# Patient Record
Sex: MALE | Race: BLACK OR AFRICAN AMERICAN | NOT HISPANIC OR LATINO | Employment: UNEMPLOYED | URBAN - METROPOLITAN AREA
[De-identification: names, ages, dates, MRNs, and addresses within clinical notes are randomized per-mention and may not be internally consistent; named-entity substitution may affect disease eponyms.]

---

## 2018-01-30 ENCOUNTER — OFFICE VISIT CONVERTED (OUTPATIENT)
Dept: FAMILY MEDICINE CLINIC | Facility: CLINIC | Age: 23
End: 2018-01-30
Attending: NURSE PRACTITIONER

## 2018-04-30 ENCOUNTER — OFFICE VISIT CONVERTED (OUTPATIENT)
Dept: FAMILY MEDICINE CLINIC | Facility: CLINIC | Age: 23
End: 2018-04-30
Attending: PHYSICIAN ASSISTANT

## 2018-06-29 ENCOUNTER — OFFICE VISIT CONVERTED (OUTPATIENT)
Dept: ONCOLOGY | Facility: HOSPITAL | Age: 23
End: 2018-06-29
Attending: INTERNAL MEDICINE

## 2018-07-16 ENCOUNTER — CONVERSION ENCOUNTER (OUTPATIENT)
Dept: FAMILY MEDICINE CLINIC | Facility: CLINIC | Age: 23
End: 2018-07-16

## 2018-07-16 ENCOUNTER — OFFICE VISIT CONVERTED (OUTPATIENT)
Dept: FAMILY MEDICINE CLINIC | Facility: CLINIC | Age: 23
End: 2018-07-16
Attending: PHYSICIAN ASSISTANT

## 2018-07-27 ENCOUNTER — OFFICE VISIT CONVERTED (OUTPATIENT)
Dept: ONCOLOGY | Facility: HOSPITAL | Age: 23
End: 2018-07-27
Attending: INTERNAL MEDICINE

## 2018-11-15 ENCOUNTER — OFFICE VISIT CONVERTED (OUTPATIENT)
Dept: ONCOLOGY | Facility: HOSPITAL | Age: 23
End: 2018-11-15
Attending: INTERNAL MEDICINE

## 2019-01-17 ENCOUNTER — HOSPITAL ENCOUNTER (OUTPATIENT)
Dept: LAB | Facility: HOSPITAL | Age: 24
Discharge: HOME | End: 2019-01-17
Attending: PHYSICIAN ASSISTANT

## 2019-01-17 ENCOUNTER — CONVERSION ENCOUNTER (OUTPATIENT)
Dept: FAMILY MEDICINE CLINIC | Facility: CLINIC | Age: 24
End: 2019-01-17

## 2019-01-17 ENCOUNTER — OFFICE VISIT CONVERTED (OUTPATIENT)
Dept: FAMILY MEDICINE CLINIC | Facility: CLINIC | Age: 24
End: 2019-01-17
Attending: PHYSICIAN ASSISTANT

## 2019-01-17 LAB
25(OH)D3 SERPL-MCNC: 8 NG/ML (ref 30–100)
ALBUMIN SERPL-MCNC: 4.4 G/DL (ref 3.5–5)
ALBUMIN/GLOB SERPL: 1.3 {RATIO} (ref 1.4–2.6)
ALP SERPL-CCNC: 80 U/L (ref 53–128)
ALT SERPL-CCNC: 7 U/L (ref 10–40)
ANION GAP SERPL CALC-SCNC: 17 MMOL/L (ref 8–19)
APPEARANCE UR: CLEAR
ASO AB SERPL-ACNC: 20 [IU]/ML (ref 0–200)
AST SERPL-CCNC: 13 U/L (ref 15–50)
BASOPHILS # BLD AUTO: 0.01 10*3/UL (ref 0–0.2)
BASOPHILS NFR BLD AUTO: 0.15 % (ref 0–3)
BILIRUB SERPL-MCNC: 1.21 MG/DL (ref 0.2–1.3)
BILIRUB UR QL: NEGATIVE
BUN SERPL-MCNC: 4 MG/DL (ref 5–25)
BUN/CREAT SERPL: 5 {RATIO} (ref 6–20)
CALCIUM SERPL-MCNC: 9.6 MG/DL (ref 8.7–10.4)
CHLORIDE SERPL-SCNC: 102 MMOL/L (ref 99–111)
COLOR UR: ABNORMAL
CONV CO2: 26 MMOL/L (ref 22–32)
CONV COLLECTION SOURCE (UA): ABNORMAL
CONV RHEUMATOID FACTOR IGM: <10 [IU]/ML (ref 0–14)
CONV TOTAL PROTEIN: 7.9 G/DL (ref 6.3–8.2)
CONV UROBILINOGEN IN URINE BY AUTOMATED TEST STRIP: 1 {EHRLICHU}/DL (ref 0.1–1)
CREAT UR-MCNC: 0.87 MG/DL (ref 0.7–1.2)
CRP SERPL HS-MCNC: <0.15 MG/DL (ref 0–0.5)
EOSINOPHIL # BLD AUTO: 0.23 10*3/UL (ref 0–0.7)
EOSINOPHIL # BLD AUTO: 4.19 % (ref 0–7)
ERYTHROCYTE [DISTWIDTH] IN BLOOD BY AUTOMATED COUNT: 11.5 % (ref 11.5–14.5)
ERYTHROCYTE [SEDIMENTATION RATE] IN BLOOD: 3 MM/H (ref 0–20)
GFR SERPLBLD BASED ON 1.73 SQ M-ARVRAT: >60 ML/MIN/{1.73_M2}
GLOBULIN UR ELPH-MCNC: 3.5 G/DL (ref 2–3.5)
GLUCOSE SERPL-MCNC: 90 MG/DL (ref 70–99)
GLUCOSE UR QL: NEGATIVE MG/DL
HBA1C MFR BLD: 14.2 G/DL (ref 14–18)
HCT VFR BLD AUTO: 41.6 % (ref 42–52)
HGB UR QL STRIP: NEGATIVE
KETONES UR QL STRIP: ABNORMAL MG/DL
LEUKOCYTE ESTERASE UR QL STRIP: NEGATIVE
LYMPHOCYTES # BLD AUTO: 2.11 10*3/UL (ref 1–5)
MCH RBC QN AUTO: 30.2 PG (ref 27–31)
MCHC RBC AUTO-ENTMCNC: 34 G/DL (ref 33–37)
MCV RBC AUTO: 88.8 FL (ref 80–96)
MONOCYTES # BLD AUTO: 0.36 10*3/UL (ref 0.2–1.2)
MONOCYTES NFR BLD AUTO: 6.43 % (ref 3–10)
NEUTROPHILS # BLD AUTO: 2.82 10*3/UL (ref 2–8)
NEUTROPHILS NFR BLD AUTO: 51.1 % (ref 30–85)
NITRITE UR QL STRIP: NEGATIVE
NRBC BLD AUTO-RTO: 0 % (ref 0–0.01)
OSMOLALITY SERPL CALC.SUM OF ELEC: 288 MOSM/KG (ref 273–304)
PH UR STRIP.AUTO: 6 [PH] (ref 5–8)
PLATELET # BLD AUTO: 240 10*3/UL (ref 130–400)
PMV BLD AUTO: 7.7 FL (ref 7.4–10.4)
POTASSIUM SERPL-SCNC: 4 MMOL/L (ref 3.5–5.3)
PROT UR QL: NEGATIVE MG/DL
RBC # BLD AUTO: 4.69 10*6/UL (ref 4.7–6.1)
SODIUM SERPL-SCNC: 141 MMOL/L (ref 135–147)
SP GR UR: 1.02 (ref 1–1.03)
T4 FREE SERPL-MCNC: 1.3 NG/DL (ref 0.9–1.8)
TSH SERPL-ACNC: 1.26 M[IU]/L (ref 0.27–4.2)
URATE SERPL-MCNC: 5.1 MG/DL (ref 3.5–8.5)
VARIANT LYMPHS NFR BLD MANUAL: 38.1 % (ref 20–45)
WBC # BLD AUTO: 5.52 10*3/UL (ref 4.8–10.8)

## 2019-01-18 LAB — CONV ANTI NUCLEAR ANTIBODY WITH REFLEX: NEGATIVE

## 2019-01-28 ENCOUNTER — HOSPITAL ENCOUNTER (OUTPATIENT)
Dept: FAMILY MEDICINE CLINIC | Facility: CLINIC | Age: 24
Discharge: HOME | End: 2019-01-28
Attending: PHYSICIAN ASSISTANT

## 2019-01-28 ENCOUNTER — HOSPITAL ENCOUNTER (OUTPATIENT)
Dept: LAB | Facility: HOSPITAL | Age: 24
Discharge: HOME | End: 2019-01-28
Attending: PHYSICIAN ASSISTANT

## 2019-01-28 ENCOUNTER — OFFICE VISIT CONVERTED (OUTPATIENT)
Dept: FAMILY MEDICINE CLINIC | Facility: CLINIC | Age: 24
End: 2019-01-28
Attending: PHYSICIAN ASSISTANT

## 2019-01-28 LAB — PTH-INTACT SERPL-MCNC: 81.1 PG/ML (ref 11.1–79.5)

## 2019-01-30 LAB
BACTERIA SPEC AEROBE CULT: NORMAL
Lab: NORMAL

## 2019-04-29 ENCOUNTER — HOSPITAL ENCOUNTER (OUTPATIENT)
Dept: GENERAL RADIOLOGY | Facility: HOSPITAL | Age: 24
Discharge: HOME | End: 2019-04-29
Attending: PHYSICIAN ASSISTANT

## 2019-04-29 ENCOUNTER — OFFICE VISIT CONVERTED (OUTPATIENT)
Dept: FAMILY MEDICINE CLINIC | Facility: CLINIC | Age: 24
End: 2019-04-29
Attending: PHYSICIAN ASSISTANT

## 2019-08-19 ENCOUNTER — OFFICE VISIT CONVERTED (OUTPATIENT)
Dept: FAMILY MEDICINE CLINIC | Facility: CLINIC | Age: 24
End: 2019-08-19
Attending: PHYSICIAN ASSISTANT

## 2019-08-19 ENCOUNTER — HOSPITAL ENCOUNTER (OUTPATIENT)
Dept: LAB | Facility: HOSPITAL | Age: 24
Discharge: HOME | End: 2019-08-19
Attending: PHYSICIAN ASSISTANT

## 2019-08-19 ENCOUNTER — HOSPITAL ENCOUNTER (OUTPATIENT)
Dept: GENERAL RADIOLOGY | Facility: HOSPITAL | Age: 24
Discharge: HOME | End: 2019-08-19
Attending: PHYSICIAN ASSISTANT

## 2019-08-19 LAB
25(OH)D3 SERPL-MCNC: 8.7 NG/ML (ref 30–100)
ALBUMIN SERPL-MCNC: 4.4 G/DL (ref 3.5–5)
ALBUMIN/GLOB SERPL: 1.5 {RATIO} (ref 1.4–2.6)
ALP SERPL-CCNC: 67 U/L (ref 53–128)
ALT SERPL-CCNC: 8 U/L (ref 10–40)
ANION GAP SERPL CALC-SCNC: 15 MMOL/L (ref 8–19)
AST SERPL-CCNC: 13 U/L (ref 15–50)
BILIRUB SERPL-MCNC: 0.77 MG/DL (ref 0.2–1.3)
BUN SERPL-MCNC: 7 MG/DL (ref 5–25)
BUN/CREAT SERPL: 7 {RATIO} (ref 6–20)
CALCIUM SERPL-MCNC: 9.4 MG/DL (ref 8.7–10.4)
CHLORIDE SERPL-SCNC: 101 MMOL/L (ref 99–111)
CONV CO2: 27 MMOL/L (ref 22–32)
CONV TOTAL PROTEIN: 7.3 G/DL (ref 6.3–8.2)
CREAT UR-MCNC: 0.97 MG/DL (ref 0.7–1.2)
FOLATE SERPL-MCNC: 2.3 NG/ML (ref 4.8–20)
GFR SERPLBLD BASED ON 1.73 SQ M-ARVRAT: >60 ML/MIN/{1.73_M2}
GLOBULIN UR ELPH-MCNC: 2.9 G/DL (ref 2–3.5)
GLUCOSE SERPL-MCNC: 93 MG/DL (ref 70–99)
IRON SATN MFR SERPL: 25 % (ref 20–55)
IRON SERPL-MCNC: 82 UG/DL (ref 70–180)
OSMOLALITY SERPL CALC.SUM OF ELEC: 286 MOSM/KG (ref 273–304)
POTASSIUM SERPL-SCNC: 3.9 MMOL/L (ref 3.5–5.3)
SODIUM SERPL-SCNC: 139 MMOL/L (ref 135–147)
T4 FREE SERPL-MCNC: 1.4 NG/DL (ref 0.9–1.8)
TIBC SERPL-MCNC: 329 UG/DL (ref 245–450)
TRANSFERRIN SERPL-MCNC: 230 MG/DL (ref 215–365)
TSH SERPL-ACNC: 1.77 M[IU]/L (ref 0.27–4.2)
VIT B12 SERPL-MCNC: 1585 PG/ML (ref 211–911)

## 2019-08-20 LAB
BARLEY IGE QN: 1.38
BEEF IGE QN: 0.14 K[IU]/ML (ref 0–0.35)
CHICKEN DROP IGE QN: 0.29
COCOA IGE QN: <0.1 K[IU]/ML (ref 0–0.35)
CORN IGE QN: <0.1 K[IU]/ML (ref 0–0.35)
COW MILK IGE QN: 1.21 K[IU]/ML (ref 0–0.35)
EGG WHITE IGE QN: 3.84 K[IU]/ML (ref 0–0.35)
IGE BREWER'S YEAST: 0.53 K[IU]/ML (ref 0–0.35)
IGE SERPL-ACNC: 1462 K[IU]/ML (ref 0–24)
IMMUNOCAP RESULT: ABNORMAL (ref 0–0)
LETTUCE IGE QN: 0.2 K[IU]/ML
MALT IGE QN: 1.73
OAT IGE QN: 0.41 K[IU]/ML (ref 0–0.35)
ORANGE IGE QN: <0.1
PEANUT IGE QN: 0.17 K[IU]/ML (ref 0–0.35)
PORK IGE: 0.13 K[IU]/ML (ref 0–0.35)
POTATO IGE QN: <0.1 K[IU]/ML (ref 0–0.35)
RYE IGE: 4.15
SOYBEAN IGE QN: 0.19 K[IU]/ML (ref 0–0.35)
TOMATO IGE QN: 0.75
WHEAT IGE QN: 9.8 K[IU]/ML (ref 0–0.35)

## 2019-08-23 ENCOUNTER — HOSPITAL ENCOUNTER (OUTPATIENT)
Dept: GENERAL RADIOLOGY | Facility: HOSPITAL | Age: 24
Discharge: HOME | End: 2019-08-23
Attending: PHYSICIAN ASSISTANT

## 2019-10-14 ENCOUNTER — HOSPITAL ENCOUNTER (OUTPATIENT)
Dept: URGENT CARE | Facility: CLINIC | Age: 24
Discharge: HOME | End: 2019-10-14
Attending: FAMILY MEDICINE

## 2019-10-18 ENCOUNTER — HOSPITAL ENCOUNTER (OUTPATIENT)
Dept: URGENT CARE | Facility: CLINIC | Age: 24
Discharge: HOME | End: 2019-10-18

## 2019-11-21 ENCOUNTER — OFFICE VISIT CONVERTED (OUTPATIENT)
Dept: FAMILY MEDICINE CLINIC | Facility: CLINIC | Age: 24
End: 2019-11-21
Attending: PHYSICIAN ASSISTANT

## 2019-12-16 ENCOUNTER — HOSPITAL ENCOUNTER (OUTPATIENT)
Dept: GENERAL RADIOLOGY | Facility: HOSPITAL | Age: 24
Discharge: HOME | End: 2019-12-16
Attending: PHYSICIAN ASSISTANT

## 2020-01-03 ENCOUNTER — HOSPITAL ENCOUNTER (OUTPATIENT)
Dept: OTHER | Facility: HOSPITAL | Age: 25
Setting detail: RECURRING SERIES
Discharge: HOME | End: 2020-02-26
Attending: FAMILY MEDICINE

## 2020-03-03 ENCOUNTER — OFFICE VISIT CONVERTED (OUTPATIENT)
Dept: FAMILY MEDICINE CLINIC | Facility: CLINIC | Age: 25
End: 2020-03-03
Attending: PHYSICIAN ASSISTANT

## 2020-04-17 ENCOUNTER — HOSPITAL ENCOUNTER (OUTPATIENT)
Dept: GENERAL RADIOLOGY | Facility: HOSPITAL | Age: 25
Discharge: HOME | End: 2020-04-17
Attending: PODIATRIST

## 2020-04-20 ENCOUNTER — TELEMEDICINE CONVERTED (OUTPATIENT)
Dept: FAMILY MEDICINE CLINIC | Facility: CLINIC | Age: 25
End: 2020-04-20
Attending: PHYSICIAN ASSISTANT

## 2020-05-28 ENCOUNTER — OFFICE VISIT CONVERTED (OUTPATIENT)
Dept: FAMILY MEDICINE CLINIC | Facility: CLINIC | Age: 25
End: 2020-05-28
Attending: PHYSICIAN ASSISTANT

## 2020-06-01 ENCOUNTER — OFFICE VISIT CONVERTED (OUTPATIENT)
Dept: NEUROLOGY | Facility: CLINIC | Age: 25
End: 2020-06-01
Attending: PSYCHIATRY & NEUROLOGY

## 2020-08-13 ENCOUNTER — TELEMEDICINE CONVERTED (OUTPATIENT)
Dept: FAMILY MEDICINE CLINIC | Facility: CLINIC | Age: 25
End: 2020-08-13
Attending: PHYSICIAN ASSISTANT

## 2021-03-18 ENCOUNTER — HOSPITAL ENCOUNTER (OUTPATIENT)
Dept: VACCINE CLINIC | Facility: HOSPITAL | Age: 26
Discharge: HOME | End: 2021-03-18
Attending: INTERNAL MEDICINE

## 2021-04-12 ENCOUNTER — HOSPITAL ENCOUNTER (OUTPATIENT)
Dept: VACCINE CLINIC | Facility: HOSPITAL | Age: 26
Discharge: HOME | End: 2021-04-12
Attending: INTERNAL MEDICINE

## 2021-05-10 NOTE — H&P
History and Physical      Patient Name: Giselle Hathaway   Patient ID: [id number]   Sex: Male   YOB: 1995    Primary Care Provider: Contreras Soler PA-C   Referring Provider: Contreras Soler PA-C    Visit Date: June 1, 2020    Provider: Macho Staley MD   Location: [facility]   Location Address: [address] [place] [address]  [id number]   Location Phone: [phone]          Chief Complaint     BUE and BLE numbness and tingling       History Of Present Illness  Giselle Hathaway is a 25 year old /Black male who presents today to [facility] today referred from Contreras Soler PA-C.      25-year-old man evaluated for numbness and tingling in his hands and his feet.  This has been going on a daily basis for the last 5 years.  He is here for nerve conduction study.  He has gastroparesis and was found to have B12 deficiency in the past.  Is presently on B6 and B12.       Past Medical History  Allergic rhinitis; Asthma; Chronic bronchitis; Depression; Esophagitis, eosinophilic; Fecal incontinence alternating with constipation; Gastroparesis; Jaundice; 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; Mental impairment; Pelvic floor dysfunction; Special educational needs; Speech impediment; Vitamin B12 deficiency; Vitamin D deficiency; Weight Loss         Past Surgical History  *No Pertinent Past Medical History         Medication List  albuterol sulfate 2.5 mg /3 mL (0.083 %) inhalation solution for nebulization; B12 5,000-100 mcg sublingual lozenge; Bepreve 1.5 % ophthalmic (eye) drops; budesonide 0.5 mg/2 mL inhalation suspension for nebulization; Carafate 1 gram oral tablet; cetirizine 10 mg oral tablet; EpiPen 0.3 mg/0.3 mL injection auto-injector; fluticasone propionate 50 mcg/actuation nasal spray,suspension; folic acid 1 mg oral tablet; Linzess 290 mcg oral capsule; loperamide 2 mg oral tablet; montelukast 10 mg oral tablet; omeprazole 40 mg oral  "capsule,delayed release(DR/EC); ondansetron 8 mg oral tablet,disintegrating; pyridoxine (vitamin B6) 200 mg oral tablet extended release; Social Security Disabilty         Allergy List  Allergic to eggs; amoxicillin; hydrocortisone; ibuprofen; Nuts; Soybean; Wheat         Family Medical History  - No Family History of Colorectal Cancer         Social History  Active but no formal exercise; Alcohol (Never); No known infection risk; Single; Tobacco (Never)         Immunizations  Name Date Admin   Influenza          Review of Systems  · Constitutional  o Denies  o : chills, excessive sweating, fatigue, fever, sycope/passing out, weight gain, weight loss  · Eyes  o Denies  o : changes in vision, blurry vision, double vision  · HENT  o Denies  o : loss of hearing, ringing in the ears, ear aches, sore throat, nasal congestion, sinus pain, nose bleeds, seasonal allergies  · Cardiovascular  o Denies  o : blood clots, swollen legs, anemia, easy burising or bleeding, transfusions  · Respiratory  o Denies  o : shortness of breath, dry cough, productive cough, pneumonia, COPD  · Gastrointestinal  o Denies  o : difficulty swallowing, reflux  · Genitourinary  o Denies  o : incontinence  · Neurologic  o Admits  o : loss of balance, numbness/tingling/paresthesia , weakness  o Denies  o : headache, seizure, stroke, tremor, falls, dizziness/vertigo, difficulty with sleep, difficulty with coordination, difficulty with dexterity  · Musculoskeletal  o Denies  o : neck stiffness/pain, swollen lymph nodes, muscle aches, joint pain, weakness, spasms, sciatica, pain radiating in arm, pain radiating in leg, low back pain  · Endocrine  o Denies  o : diabetes, thyroid disorder  · Psychiatric  o Denies  o : anxiety, depression      Vitals  Date Time BP Position Site L\R Cuff Size HR RR TEMP (F) WT  HT  BMI kg/m2 BSA m2 O2 Sat HC       06/01/2020 12:53 /78 Sitting    70 - R 16 97.5 133lbs 0oz 6'  3\" 16.62 1.79           Physical " Examination     He is alert, fluent, phasic, follows commands well.  There is no weakness of the upper or lower extremities on visual muscle testing.  There is no atrophy or fasciculations.  Reflexes are absent in the biceps, triceps, patellar's and decreased in the ankles.  Sensation symmetrical to pinprick.  There is a questionable sensory level in the mid thoracic spine posteriorly but not anteriorly.  There is no sensory loss in the upper or lower extremities on a glove and stocking distribution.  Station gait is able to tiptoe, heel walk on the right and tandem without difficulty.  There is no tremor noted times extended on finger-to-nose testing.           Assessment  · Numbness and tingling       Anesthesia of skin     782.0/R20.0  Paresthesia of skin     782.0/R20.2  This study is normal and does not show electrophysiologic evidence for sensorimotor polyneuropathy.    I would recommend for the patient to be referred to the [facility] peripheral neuropathy clinic for further evaluation. Symptoms to be secondary to small fiber neuropathy. I do not know the etiology of his gastroparesis. It could be secondary to autonomic neuropathy.    Problems Reconciled  Plan  · Orders  o Nerve conduction studies; 9-10 studies (75666) - 782.0/R20.0, 782.0/R20.2 - 06/01/2020  · Medications  o Medications have been Reconciled  o Transition of Care or Provider Policy            Electronically Signed by: Macho Staley MD -Author on June 1, 2020 02:05:55 PM

## 2021-05-12 NOTE — PROGRESS NOTES
Progress Note      Patient Name: Giselle Hathaway   Patient ID: [id number]   Sex: Male   YOB: 1995    Primary Care Provider: Contreras Soler PA-C   Referring Provider: Contreras Soler PA-C    Visit Date: April 20, 2020    Provider: Contreras Soler PA-C   Location: [facility]   Location Address: [address] [place] [address], [address]  [place], [place]  [id number]   Location Phone: [phone]          Chief Complaint  · pt is having numbness in hands and feet      History Of Present Illness  Video Conferencing Visit  Giselle Hathaway is a 25 year old /Black male who is presenting for evaluation via video conferencing. Verbal consent obtained before beginning visit.   The following staff were present during this visit: Bethany Leigh CMA/Contreras Soler PA-C   Giselle Hathaway is a 25 year old /Black male who presents for evaluation and treatment of: numbness in hands and feet.      pt presents today for numbness in hands and feet. pt also stated hands are starting to shake more and is starting to have more migraines. The migraines start at the nose and go all the way back to the neck.    pt stated they are needing refills on all meds.    Pt states that he has been taking his B12/Fol and it has helped some, but he is experiencing N/T in both UE and LE and some weakness.    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 08/18/2016 --    Mental impairment 08/18/2016 --    Pelvic floor dysfunction 07/28/2015 --    Special educational needs 03/14/2017 --    Speech impediment 08/18/2016 --    Vitamin B12 deficiency 07/18/2016 --    Vitamin  D deficiency 08/18/2016 --    Weight Loss --  --          Past Surgical History  Procedure Name Date Notes   *No Pertinent Past Medical History --  --          Medication List  Name Date Started Instructions   albuterol sulfate 2.5 mg /3 mL (0.083 %) inhalation solution for nebulization 09/17/2019 USE ONE VIAL (3MLS/2.5MG) IN NEBULIZER EVERY 8 HOURS   B12 5,000-100 mcg sublingual lozenge 03/03/2020 dissolve 1 lozenge by sublingual route daily   Bepreve 1.5 % ophthalmic (eye) drops  instill 1 drop into affected eye(s) by ophthalmic route 2 times per day   budesonide 0.5 mg/2 mL inhalation suspension for nebulization 09/17/2019 sprinkle on splenda or sugar every day.   Carafate 1 gram oral tablet  take 1 tablet (1 gram) by oral route 2 times per day on an empty stomach   cetirizine 10 mg oral tablet 09/17/2019 TAKE ONE TABLET BY MOUTH ONCE DAILY FOR 30 DAYS for 30 days   EpiPen 0.3 mg/0.3 mL injection auto-injector 09/17/2019 inject 0.3 milliliter (0.3 mg) by intramuscular route once as needed for anaphylaxis   fluticasone propionate 50 mcg/actuation nasal spray,suspension 01/09/2020 spray 1 spray (50 mcg) in each nostril by intranasal route once daily for 30 days   folic acid 1 mg oral tablet 09/17/2019 take 1 tablet (1 mg) by oral route once daily for 30 days   Linzess 290 mcg oral capsule 09/17/2019 TAKE ONE CAPSULE BY MOUTH EVERY DAY for 30 days   loperamide 2 mg oral tablet  q 4 hours as needed   montelukast 10 mg oral tablet 09/17/2019 TAKE ONE TABLET (10 MG) BY MOUTH ONCE DAILY IN THE EVENING for 30 days   omeprazole 40 mg oral capsule,delayed release(DR/EC) 03/03/2020 take 1 capsule (40 mg) by oral route once daily before a meal for 90 days   ondansetron 8 mg oral tablet,disintegrating 11/23/2019 DISSOLVE ONE TABLET IN MOUTH EVERY 8 HOURS AS NEEDED FOR NAUSEA AND VOMITING for 30 days   pyridoxine (vitamin B6) 100 mg oral tablet 09/17/2019 take 1 tablet by oral route 2 times a day for 30 days   Social Security  Disabilty 12/28/2017 Pt needs to complete application for social sec disability. Due to his limited intellectual function.         Allergy List  Allergen Name Date Reaction Notes   Allergic to eggs --  --  --    amoxicillin --  --  --    hydrocortisone --  rash --    ibuprofen --  rash --    Nuts --  --  --    Soybean --  --  --    Wheat --  --  --        Allergies Reconciled  Family Medical History  Disease Name Relative/Age Notes   - No Family History of Colorectal Cancer  --          Social History  Finding Status Start/Stop Quantity Notes   Active but no formal exercise --  --/-- --  --    Alcohol Never --/-- --  04/30/2018 - 07/18/2016 -    No known infection risk --  --/-- --  --    Single --  --/-- --  --    Tobacco Never --/-- --  --          Immunizations  NameDate Admin Mfg Trade Name Lot Number Route Inj VIS Given VIS Publication   [id number]/05/2019 [facility] Fluzone Quadrivalent [id number] IM RD 12/05/2019    Comments: PT tolerated injection well;was given the egg free injection         Review of Systems  · Constitutional  o Denies  o : fever, fatigue, weight loss, weight gain  · Cardiovascular  o Denies  o : lower extremity edema, claudication, chest pressure, palpitations  · Respiratory  o Denies  o : shortness of breath, wheezing, cough, hemoptysis, dyspnea on exertion  · Gastrointestinal  o Denies  o : nausea, vomiting, diarrhea, constipation, abdominal pain      Physical Examination  · Constitutional  o Appearance  o : well-nourished, no acute distress  · Respiratory  o Respiratory Effort  o : breathing comfortably, no cough  · Skin and Subcutaneous Tissue  o General Inspection  o : no visible rash, no lesions  · Neurologic  o Mental Status Examination  o :   § Orientation  § : grossly oriented to person, place and time, no facial droop          Assessment  · Asthma     493.90/J45.909  · Depression     311/F32.9  · Vitamin D deficiency     268.9/E55.9  · Numbness and tingling in both  hands       Anesthesia of skin     782.0/R20.0  Paresthesia of skin     782.0/R20.2  · Numbness and tingling of both feet       Anesthesia of skin     782.0/R20.0  Paresthesia of skin     782.0/R20.2  · Constipation     564.00/K59.00      Plan  · Orders  o ACO-39: Current medications updated and reviewed () - - 04/20/2020  o EMG/NCV of Lower Extremities Bilaterally (21434) - 782.0/R20.0, 782.0/R20.2 - 04/20/2020  o EMG/NCV of Upper Extremities Bilaterally (89855) - 782.0/R20.0, 782.0/R20.2 - 04/20/2020  · Medications  o Medications have been Reconciled  o Transition of Care or Provider Policy  · Instructions  o Take all medications as prescribed/directed.  o Patient was educated/instructed on their diagnosis, treatment and medications prior to discharge from the clinic today.  o Discussed Covid-19 precautions including, but not limited to, social distancing, avoid touching your face, and hand washing.   · Disposition  o Call or Return if symptoms worsen or persist.  o F/U in clinic in 1 month.  o Care Transition            Electronically Signed by: Contreras Soler PA-C -Author on April 20, 2020 01:41:41 PM

## 2021-05-13 NOTE — PROGRESS NOTES
"   Progress Note      Patient Name: Giselle Hathaway   Patient ID: [id number]   Sex: Male   YOB: 1995    Primary Care Provider: Contreras Soler PA-C   Referring Provider: Contreras Soler PA-C    Visit Date: August 13, 2020    Provider: Contreras Soler PA-C   Location: [facility]   Location Address: [address] [place] [address], [address]  [place], [place]  [id number]   Location Phone: [phone]          Chief Complaint  · Abdominal Pain      History Of Present Illness  Video Conferencing Visit  Giselle Hathaway is a 25 year old /Black male who is presenting for evaluation via video conferencing via [employer]. Verbal consent obtained before beginning visit.   The following staff were present during this visit: [address]   Giselle Hathaway is a 25 year old /Black male who presents for evaluation and treatment of:      abdominal pain    Mom states pt has c/o abdominal pain for the last 2 days    Symptoms associated with vomiting, abdominal pain and Diarrhea.      Denies fever, cough, or ST    Mom states that his neuropathy is getting worse.  Moving \"all over\" according to her  Linzess is causing too much diarrhea    Mom states that they have not been able to get his meds from [facility]    Encouraged her to seek assistance with SSI       Past Medical History  Disease Name Date Onset Notes   Allergic rhinitis --  --    Asthma 07/28/2015 --    Chronic bronchitis --  --    Depression --  --    Esophagitis, eosinophilic 08/18/2016 --    Fecal incontinence alternating with constipation 08/18/2016 --    Gastroparesis 07/28/2015 --    Jaundice --  --    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 08/18/2016 --    Mental impairment 08/18/2016 --    Pelvic floor dysfunction 07/28/2015 --    Special educational needs 03/14/2017 --    Speech impediment 08/18/2016 --    Vitamin B12 deficiency 07/18/2016 --    Vitamin D deficiency 08/18/2016 --    Weight Loss --  --          Past Surgical " History  Procedure Name Date Notes   *No Pertinent Past Medical History --  --          Medication List  Name Date Started Instructions   albuterol sulfate 2.5 mg /3 mL (0.083 %) inhalation solution for nebulization 09/17/2019 USE ONE VIAL (3MLS/2.5MG) IN NEBULIZER EVERY 8 HOURS   B12 5,000-100 mcg sublingual lozenge 08/13/2020 dissolve 1 lozenge by sublingual route daily   Bepreve 1.5 % ophthalmic (eye) drops  instill 1 drop into affected eye(s) by ophthalmic route 2 times per day   budesonide 0.5 mg/2 mL inhalation suspension for nebulization 09/17/2019 sprinkle on splenda or sugar every day.   Carafate 1 gram oral tablet  take 1 tablet (1 gram) by oral route 2 times per day on an empty stomach   cetirizine 10 mg oral tablet 08/13/2020 TAKE ONE TABLET BY MOUTH ONCE DAILY FOR 30 DAYS for 30 days   EpiPen 0.3 mg/0.3 mL injection auto-injector 09/17/2019 inject 0.3 milliliter (0.3 mg) by intramuscular route once as needed for anaphylaxis   fluticasone propionate 50 mcg/actuation nasal spray,suspension 08/13/2020 spray 1 spray (50 mcg) in each nostril by intranasal route once daily for 30 days   folic acid 1 mg oral tablet 08/13/2020 take 1 tablet (1 mg) by oral route once daily for 30 days   loperamide 2 mg oral tablet  q 4 hours as needed   montelukast 10 mg oral tablet 08/13/2020 TAKE 1 TABLET BY MOUTH ONCE DAILY IN THE EVENING FOR 30 DAYS   omeprazole 40 mg oral capsule,delayed release(DR/EC) 08/13/2020 take 1 capsule (40 mg) by oral route once daily before a meal for 90 days   ondansetron 8 mg oral tablet,disintegrating 08/13/2020 DISSOLVE 1 TABLET IN MOUTH EVERY 8 HOURS AS NEEDED FOR NAUSEA AND VOMITING FOR 30 DAYS   pyridoxine (vitamin B6) 200 mg oral tablet extended release 08/13/2020 take 1 tablet by oral route 2 times a day for 30 days   Social Security Disabilty 12/28/2017 Pt needs to complete application for social sec disability. Due to his limited intellectual function.         Allergy List  Allergen  Name Date Reaction Notes   Allergic to eggs --  --  --    amoxicillin --  --  --    hydrocortisone --  rash --    ibuprofen --  rash --    Nuts --  --  --    Soybean --  --  --    Wheat --  --  --        Allergies Reconciled  Family Medical History  Disease Name Relative/Age Notes   - No Family History of Colorectal Cancer  --          Social History  Finding Status Start/Stop Quantity Notes   Active but no formal exercise --  --/-- --  --    Alcohol Never --/-- --  04/30/2018 - 07/18/2016 -    No known infection risk --  --/-- --  --    Single --  --/-- --  --    Tobacco Never --/-- --  --          Immunizations  NameDate Admin Mfg Trade Name Lot Number Route Inj VIS Given VIS Publication   [id number]/05/2019 [facility] Fluzone Quadrivalent [id number] IM RD 12/05/2019    Comments: PT tolerated injection well;was given the egg free injection         Review of Systems  · Constitutional  o Denies  o : fever, fatigue, weight loss, weight gain  · Cardiovascular  o Denies  o : lower extremity edema, claudication, chest pressure, palpitations  · Respiratory  o Denies  o : shortness of breath, wheezing, cough, hemoptysis, dyspnea on exertion  · Gastrointestinal  o Denies  o : nausea, vomiting, diarrhea, constipation, abdominal pain      Physical Examination  · Constitutional  o Appearance  o : thin, well developed, alert, in no acute distress  · Respiratory  o Respiratory Effort  o : breathing comfortably, no cough  · Skin and Subcutaneous Tissue  o General Inspection  o : no visible rash, no lesions  · Neurologic  o Mental Status Examination  o :   § Orientation  § : grossly oriented to person, place and time, no facial droop          Assessment  · Abdominal pain     789.00/R10.9  · Diarrhea     787.91/R19.7  · Vomiting     787.03/R11.10  · Esophagitis, eosinophilic     530.13/K20.0  · Gastroparesis     536.3/K31.84  · 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     V40.9/F48.9  · Mental impairment     319/F79  · Pelvic floor  dysfunction     618.83/M62.89  · Special educational needs     V62.3/Z55.9  · Speech impediment     784.59/R47.9  · Vitamin B12 deficiency     266.2/E53.8      Plan  · Orders  o ACO-39: Current medications updated and reviewed () - - 08/13/2020  · Medications  o Trulance 3 mg oral tablet   SIG: take 1 tablet (3 mg) by oral route once daily   DISP: (30) tablets with 5 refills  Prescribed on 08/13/2020     o B12 5,000-100 mcg sublingual lozenge   SIG: dissolve 1 lozenge by sublingual route daily   DISP: (30) lozenges with 11 refills  Refilled on 08/13/2020     o cetirizine 10 mg oral tablet   SIG: TAKE ONE TABLET BY MOUTH ONCE DAILY FOR 30 DAYS for 30 days   DISP: (30) Tablet with 5 refills  Refilled on 08/13/2020     o fluticasone propionate 50 mcg/actuation nasal spray,suspension   SIG: spray 1 spray (50 mcg) in each nostril by intranasal route once daily for 30 days   DISP: (1) 9.9 ml aer w/adap with 5 refills  Refilled on 08/13/2020     o folic acid 1 mg oral tablet   SIG: take 1 tablet (1 mg) by oral route once daily for 30 days   DISP: (30) tablets with 5 refills  Refilled on 08/13/2020     o montelukast 10 mg oral tablet   SIG: TAKE 1 TABLET BY MOUTH ONCE DAILY IN THE EVENING FOR 30 DAYS   DISP: (30) Tablet with 5 refills  Refilled on 08/13/2020     o omeprazole 40 mg oral capsule,delayed release(DR/EC)   SIG: take 1 capsule (40 mg) by oral route once daily before a meal for 90 days   DISP: (90) capsules with 1 refills  Refilled on 08/13/2020     o ondansetron 8 mg oral tablet,disintegrating   SIG: DISSOLVE 1 TABLET IN MOUTH EVERY 8 HOURS AS NEEDED FOR NAUSEA AND VOMITING FOR 30 DAYS   DISP: (30) Each with 0 refills  Refilled on 08/13/2020     o pyridoxine (vitamin B6) 200 mg oral tablet extended release   SIG: take 1 tablet by oral route 2 times a day for 30 days   DISP: (60) tablets with 5 refills  Refilled on 08/13/2020     o Linzess 290 mcg oral capsule   SIG: TAKE ONE CAPSULE BY MOUTH EVERY DAY for 30  days   DISP: (30) Capsule with 3 refills  Discontinued on 08/13/2020     o Medications have been Reconciled  o Transition of Care or Provider Policy  · Instructions  o Instructed to seek medical attention urgently for new or worsening symptoms.  o Take all medications as prescribed/directed.  o Patient was educated/instructed on their diagnosis, treatment and medications prior to discharge from the clinic today.  · Disposition  o Call or Return if symptoms worsen or persist.  o F/U in clinic in 1 month.  o Care Transition            Electronically Signed by: Contreras Soler PA-C -Author on August 13, 2020 12:47:51 PM

## 2021-05-13 NOTE — PROGRESS NOTES
Progress Note      Patient Name: Giselle Hathaway   Patient ID: [id number]   Sex: Male   YOB: 1995    Primary Care Provider: Contreras Soler PA-C   Referring Provider: Contreras Soler PA-C    Visit Date: May 28, 2020    Provider: Contreras Soler PA-C   Location: [facility]   Location Address: [address] [place] [address], [address]  [place], [place]  [id number]   Location Phone: [phone]          Chief Complaint  · 1 mth follow up  · N/T in BUE and BLE      History Of Present Illness  Giselle Hathaway is a 25 year old /Black male who presents for evaluation and treatment of:      1 mth follow up    N/T in BUE and BLE.  States the numbness starts in the feet and refers up the calf to the knee.  Hand numbness refers up to the elbow.  No change since last visit.  Pt has EMG/NCV scheduled on 6/1 with Dr. Staley.    Pt c/o seeing spots of color everywhere.  If looking at the TV, wall or anything he will see splotches of color that last for a few minutes and then disappears.    Pt has testing June 1st.    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 08/18/2016 --    Mental impairment 08/18/2016 --    Pelvic floor dysfunction 07/28/2015 --    Special educational needs 03/14/2017 --    Speech impediment 08/18/2016 --    Vitamin B12 deficiency 07/18/2016 --    Vitamin D deficiency 08/18/2016 --    Weight Loss --  --          Past Surgical History  Procedure Name Date Notes   *No Pertinent Past Medical History --  --          Medication List  Name Date Started Instructions    albuterol sulfate 2.5 mg /3 mL (0.083 %) inhalation solution for nebulization 09/17/2019 USE ONE VIAL (3MLS/2.5MG) IN NEBULIZER EVERY 8 HOURS   B12 5,000-100 mcg sublingual lozenge 03/03/2020 dissolve 1 lozenge by sublingual route daily   Bepreve 1.5 % ophthalmic (eye) drops  instill 1 drop into affected eye(s) by ophthalmic route 2 times per day   budesonide 0.5 mg/2 mL inhalation suspension for nebulization 09/17/2019 sprinkle on splenda or sugar every day.   Carafate 1 gram oral tablet  take 1 tablet (1 gram) by oral route 2 times per day on an empty stomach   cetirizine 10 mg oral tablet 09/17/2019 TAKE ONE TABLET BY MOUTH ONCE DAILY FOR 30 DAYS for 30 days   EpiPen 0.3 mg/0.3 mL injection auto-injector 09/17/2019 inject 0.3 milliliter (0.3 mg) by intramuscular route once as needed for anaphylaxis   fluticasone propionate 50 mcg/actuation nasal spray,suspension 01/09/2020 spray 1 spray (50 mcg) in each nostril by intranasal route once daily for 30 days   folic acid 1 mg oral tablet 09/17/2019 take 1 tablet (1 mg) by oral route once daily for 30 days   Linzess 290 mcg oral capsule 09/17/2019 TAKE ONE CAPSULE BY MOUTH EVERY DAY for 30 days   loperamide 2 mg oral tablet  q 4 hours as needed   montelukast 10 mg oral tablet 05/06/2020 TAKE 1 TABLET BY MOUTH ONCE DAILY IN THE EVENING FOR 30 DAYS   omeprazole 40 mg oral capsule,delayed release(DR/EC) 03/03/2020 take 1 capsule (40 mg) by oral route once daily before a meal for 90 days   ondansetron 8 mg oral tablet,disintegrating 05/26/2020 DISSOLVE 1 TABLET IN MOUTH EVERY 8 HOURS AS NEEDED FOR NAUSEA AND VOMITING FOR 30 DAYS   pyridoxine (vitamin B6) 200 mg oral tablet extended release 05/28/2020 take 1 tablet by oral route 2 times a day for 30 days   Social Security Disabilty 12/28/2017 Pt needs to complete application for social sec disability. Due to his limited intellectual function.         Allergy List  Allergen Name Date Reaction Notes   Allergic to eggs --   "--  --    amoxicillin --  --  --    hydrocortisone --  rash --    ibuprofen --  rash --    Nuts --  --  --    Soybean --  --  --    Wheat --  --  --          Family Medical History  Disease Name Relative/Age Notes   - No Family History of Colorectal Cancer  --          Social History  Finding Status Start/Stop Quantity Notes   Active but no formal exercise --  --/-- --  --    Alcohol Never --/-- --  04/30/2018 - 07/18/2016 -    No known infection risk --  --/-- --  --    Single --  --/-- --  --    Tobacco Never --/-- --  --          Immunizations  NameDate Admin Mfg Trade Name Lot Number Route Inj VIS Given VIS Publication   [id number]/05/2019 [facility] Fluzone Quadrivalent [id number] IM RD 12/05/2019    Comments: PT tolerated injection well;was given the egg free injection         Review of Systems  · Constitutional  o Denies  o : fever, fatigue, weight loss, weight gain  · Cardiovascular  o Denies  o : lower extremity edema, claudication, chest pressure, palpitations  · Respiratory  o Denies  o : shortness of breath, wheezing, cough, hemoptysis, dyspnea on exertion  · Gastrointestinal  o Denies  o : nausea, vomiting, diarrhea, constipation, abdominal pain      Vitals  Date Time BP Position Site L\R Cuff Size HR RR TEMP (F) WT  HT  BMI kg/m2 BSA m2 O2 Sat        05/28/2020 09:44 /60 Sitting    88 - R   132lbs 0oz 6'  3\" 16.5 1.78 97 %          Physical Examination  · Constitutional  o Appearance  o : well developed, well-nourished, no acute distress  · Head and Face  o Head  o : normocephalic, atraumatic  · Neck  o Inspection/Palpation  o : normal appearance, no masses or tenderness, trachea midline  o Thyroid  o : gland size normal, nontender, no nodules or masses present on palpation  · Respiratory  o Respiratory Effort  o : breathing unlabored  o Inspection of Chest  o : chest rise symmetric bilaterally  o Auscultation of Lungs  o : clear to auscultation bilaterally throughout inspiration and " expiration  · Cardiovascular  o Heart  o :   § Auscultation of Heart  § : regular rate and rhythm, no murmurs, gallops or rubs  o Peripheral Vascular System  o :   § Extremities  § : no edema  · Lymphatic  o Neck  o : no cervical lymphadenopathy, no supraclavicular lymphadenopathy  · Psychiatric  o Mood and Affect  o : mood normal, affect appropriate          Assessment  · Numbness and tingling in both hands       Anesthesia of skin     782.0/R20.0  Paresthesia of skin     782.0/R20.2  · Numbness and tingling of both feet       Anesthesia of skin     782.0/R20.0  Paresthesia of skin     782.0/R20.2  · Esophagitis, eosinophilic     530.13/K20.0  · Gastroparesis     536.3/K31.84  · Mental impairment     319/F79  · Pelvic floor dysfunction     618.83/M62.89  · Special educational needs     V62.3/Z55.9  · Speech impediment     784.59/R47.9  · Vitamin B12 deficiency     266.2/E53.8      Plan  · Orders  o ACO-39: Current medications updated and reviewed () - - 05/28/2020  · Medications  o pyridoxine (vitamin B6) 250 mg oral tablet   SIG: take 1 tablet by oral route 2 times a day for 30 days   DISP: (60) tablets with 5 refills  Adjusted on 05/28/2020     o Medications have been Reconciled  o Transition of Care or Provider Policy  · Instructions  o Take all medications as prescribed/directed.  o Patient instructed/educated on their diet and exercise program.  o Patient was educated/instructed on their diagnosis, treatment and medications prior to discharge from the clinic today.  o Discussed Covid-19 precautions including, but not limited to, social distancing, avoid touching your face, and hand washing.   · Disposition  o Call or Return if symptoms worsen or persist.  o F/U in 3 months.  o Care Transition            Electronically Signed by: Contreras Soler PA-C -Author on May 28, 2020 10:33:03 AM

## 2021-05-15 VITALS
BODY MASS INDEX: 15.23 KG/M2 | WEIGHT: 122.5 LBS | SYSTOLIC BLOOD PRESSURE: 104 MMHG | HEART RATE: 61 BPM | OXYGEN SATURATION: 99 % | HEIGHT: 75 IN | DIASTOLIC BLOOD PRESSURE: 68 MMHG

## 2021-05-15 VITALS
SYSTOLIC BLOOD PRESSURE: 120 MMHG | BODY MASS INDEX: 16.54 KG/M2 | WEIGHT: 133 LBS | HEIGHT: 75 IN | RESPIRATION RATE: 16 BRPM | DIASTOLIC BLOOD PRESSURE: 78 MMHG | HEART RATE: 70 BPM | TEMPERATURE: 97.5 F

## 2021-05-15 VITALS
TEMPERATURE: 97.9 F | BODY MASS INDEX: 16.16 KG/M2 | WEIGHT: 130 LBS | SYSTOLIC BLOOD PRESSURE: 100 MMHG | HEART RATE: 84 BPM | HEIGHT: 75 IN | DIASTOLIC BLOOD PRESSURE: 58 MMHG | OXYGEN SATURATION: 97 %

## 2021-05-15 VITALS
HEIGHT: 75 IN | BODY MASS INDEX: 16.41 KG/M2 | WEIGHT: 132 LBS | DIASTOLIC BLOOD PRESSURE: 60 MMHG | OXYGEN SATURATION: 97 % | SYSTOLIC BLOOD PRESSURE: 120 MMHG | HEART RATE: 88 BPM

## 2021-05-15 VITALS
HEIGHT: 75 IN | DIASTOLIC BLOOD PRESSURE: 72 MMHG | BODY MASS INDEX: 16.18 KG/M2 | WEIGHT: 130.12 LBS | SYSTOLIC BLOOD PRESSURE: 115 MMHG | HEART RATE: 93 BPM | OXYGEN SATURATION: 98 %

## 2021-05-15 VITALS
HEIGHT: 75 IN | SYSTOLIC BLOOD PRESSURE: 110 MMHG | OXYGEN SATURATION: 98 % | BODY MASS INDEX: 15.79 KG/M2 | WEIGHT: 127 LBS | HEART RATE: 95 BPM | DIASTOLIC BLOOD PRESSURE: 74 MMHG

## 2021-05-16 VITALS
WEIGHT: 121 LBS | DIASTOLIC BLOOD PRESSURE: 70 MMHG | BODY MASS INDEX: 15.04 KG/M2 | OXYGEN SATURATION: 98 % | SYSTOLIC BLOOD PRESSURE: 110 MMHG | HEIGHT: 75 IN | HEART RATE: 101 BPM

## 2021-05-16 VITALS
BODY MASS INDEX: 15.34 KG/M2 | HEIGHT: 75 IN | OXYGEN SATURATION: 98 % | TEMPERATURE: 97.8 F | HEART RATE: 90 BPM | SYSTOLIC BLOOD PRESSURE: 118 MMHG | DIASTOLIC BLOOD PRESSURE: 73 MMHG | WEIGHT: 123.37 LBS

## 2021-05-16 VITALS
BODY MASS INDEX: 15.79 KG/M2 | HEART RATE: 62 BPM | SYSTOLIC BLOOD PRESSURE: 111 MMHG | DIASTOLIC BLOOD PRESSURE: 70 MMHG | WEIGHT: 127 LBS | HEIGHT: 75 IN | OXYGEN SATURATION: 97 %

## 2021-05-16 VITALS
HEART RATE: 96 BPM | SYSTOLIC BLOOD PRESSURE: 105 MMHG | WEIGHT: 125 LBS | DIASTOLIC BLOOD PRESSURE: 62 MMHG | OXYGEN SATURATION: 96 %

## 2021-05-16 VITALS
BODY MASS INDEX: 16.95 KG/M2 | DIASTOLIC BLOOD PRESSURE: 66 MMHG | SYSTOLIC BLOOD PRESSURE: 99 MMHG | WEIGHT: 136.31 LBS | HEIGHT: 75 IN | TEMPERATURE: 96.7 F | HEART RATE: 72 BPM

## 2021-05-28 VITALS
WEIGHT: 124.78 LBS | RESPIRATION RATE: 16 BRPM | BODY MASS INDEX: 16.54 KG/M2 | HEIGHT: 73 IN | HEART RATE: 88 BPM | DIASTOLIC BLOOD PRESSURE: 67 MMHG | BODY MASS INDEX: 16.77 KG/M2 | TEMPERATURE: 97.8 F | BODY MASS INDEX: 16.27 KG/M2 | OXYGEN SATURATION: 99 % | SYSTOLIC BLOOD PRESSURE: 98 MMHG | WEIGHT: 126.54 LBS | OXYGEN SATURATION: 99 % | SYSTOLIC BLOOD PRESSURE: 110 MMHG | HEIGHT: 73 IN | HEART RATE: 73 BPM | TEMPERATURE: 97.3 F | SYSTOLIC BLOOD PRESSURE: 104 MMHG | OXYGEN SATURATION: 99 % | DIASTOLIC BLOOD PRESSURE: 66 MMHG | TEMPERATURE: 98.1 F | HEIGHT: 73 IN | DIASTOLIC BLOOD PRESSURE: 64 MMHG | HEART RATE: 64 BPM | WEIGHT: 122.8 LBS

## 2021-05-28 NOTE — PROGRESS NOTES
Patient: ANTHONY MANZANO     Acct: [id number]     Report: #[id number]  UNIT #: [id number]     : 1995    Encounter Date:11/15/2018  PRIMARY CARE: CECIL COTTON  ***Signed***  --------------------------------------------------------------------------------------------------------------------  NURSE INTAKE      Visit Type      Established Patient Visit            Chief Complaint      ANEMIA            History and Present Illness      Past History      This is a pleasant 22-year-old male who presents for evaluation of vitamin B12     deficiency, anemia, and weight loss.  Patient has a history of pelvic floor     dysfunction.             -.  WBC 8.38.  Hemoglobin 14.2.  Platelet count 278,000.      -.  WBC 8.24.  Hemoglobin 13.9.  Platelet count 270,000.      -.  WBC 6.31.  Hemoglobin 13.9.  Platelet count 282,000.      -April 3-2017.  WBC 7.66.  Hemoglobin 4.66.  Platelet count 286,000.        -.  WBC 7.67.  Hemoglobin 13.6.  Platelet count 263,000.      -.  WBC 4.79.  Absolute neutrophil count 3200.  Absolute lymphocyte     count 860.  Hemoglobin 13.8.  Platelet count 255,000.      -.  WBC 7.2.  Hemoglobin 14.9.  Platelet count 276,000.  Vitamin     B12 581.  Folate 5.2      -.  Vitamin B-12 569.  Folate 3.5.      -.  WBC 6.03.  Hemoglobin 14.3.  Platelet count 259,000      -November  WBC 6.04.  Hemoglobin 13.6.            HPI - Hematology Interim      Patient presents with worsening fatigue in setting of anemia.            PAST, FAMILY   Social History      Lives independently:  No      Occupation:  lives with mother            Tobacco Use      Tobacco status:  Never smoker            Alcohol Use      Alcohol intake:  None            Substance Use      Substance use:  Denies use            REVIEW OF SYSTEMS      General:  Admits: Fatigue;          Denies: Appetite Change, Fever, Night  Sweats, Weight Gain, Weight Loss      Eye:  Denies: Blurred Vision, Corrective Lenses, Diplopia, Eye Irritation, Eye     Pain, Eye Redness, Spots in Vision, Vision Loss      ENT:  Denies: Headache, Hearing Loss, Hoarseness, Seizures, Sinus Congestion,     Sore Throat      Cardiovascular:  Denies: Chest Pain, Edema Ankles, Edema Legs, Irregular     Heartbeat, Palpitations      Respiratory:  Denies: Coughing Blood, Productive Cough, Shortness of Air,     Wheezing      Gastrointestinal:  Denies: Bloody Stools, Constipation, Diarrhea, Frequent     Heartburn, Nausea, Problem Swallowing, Tarry Stools, Unable to Control Bowels,     Vomiting      Genitourinary (male):  Denies: Blood in Urine, Decrease Urine Stream, Frequent     Urination, Incontinence, Painful Urination      Musculoskeletal:  Denies: Back Pain, Leg Cramps, Muscle Pain, Muscle Weakness,     Painful Joints, Swollen Joints      Integumentary:  Denies: Bleeds Easily, Bruises Easily, Hair Changes, Jaundice,     Lesions, Mole Changes, Nail Changes, Pigment Changes, Rash, Skin Discoloration      Neurologic:  Denies: Dizziness, Fainting, Numbness\Tingling, Paralysis, Seizures      Psychiatric:  Admits: Anxiety;          Denies: Confused, Depression, Disoriented, Memory Loss      Endocrine:  Denies: Cold Intolerance, Diabetes, Excessive Sweating, Excessive     Thirst, Excessive Urination, Heat Intolerance, Flushing, Hyperthyroidism,     Hypothyroidism            VITAL SIGNS AND SCORES      Vitals      Height 6 ft 1 in / 185.42 cm      Weight 124 lbs 12.486 oz / 56.6 kg      BSA 1.76 m2      BMI 16.5 kg/m2      Temperature 97.8 F / 36.56 C - Temporal      Pulse 73      Blood Pressure 110/67 Sitting, Left Arm      Pulse Oximetry 99%, ROOM AIR            Pain Score      Experiencing any pain?:  No      Pain Scale Used:  Numerical      Pain Intensity:  0            Fatigue Score      Experiencing any fatigue?:  Yes      Fatigue (0-10 scale):  8            EXAM       General Appearance:  Positive for: Alert, Oriented x3, Cooperative      Eye:  Positive for: Anicteric Sclerae, PERRLA;          Negative for: Moist Conjunctiva      HEENT:  Positive for: Oropharynx clear;          Negative for: Erythema      Neck:  Positive for: Full ROM;          Negative for: JVD      Respiratory:  Positive for: CTAB, Normal Respiratory Effort      Abdomen/Gastro:  Positive for: Normal Active Bowel Sounds;          Negative for: Hepatosplenomegaly      Cardiovascular:  Positive for: Normal PMI;          Negative for: Murmur      Skin:  Positive for: Normal Temperature, Normal Tone      Psychiatric:  Positive for: AAO X 3, Appropriate Affect      Neurologic:  Positive for: Cranial Ner II-XII Intact, Deep Tendon Reflexes      Musculoskeletal:  Positive for: Full ROM Lower Extremety, Full ROM Upper     Extremety, Full Muscle Strength, Full Muscle Tone      Lower Extremities:  Positive for: Normal Gait and Station;          Negative for: Weakness      Lymphatic:  Negative for: Axillary, Cervical, Epitrochlear, Femoral,     Infraclavicular, Inguinal, Occipital, Popliteal, Posterior auricular,     Preauricular, Supraclavicular            PREVENTION      Hx Influenza Vaccination:  No      Influenza Vaccine Declined:  No      2 or More Falls Past Year?:  No      Fall Past Year with Injury?:  No      Hx Pneumococcal Vaccination:  No      Encouraged to follow-up with:  PCP regarding preventative exams.      Chart initiated by      GREGORIO TERRY CMA            ALLERGY/MEDS      Allergies      Coded Allergies:             AMOXICILLIN (Verified  Allergy, Unknown, reddness,rash, 11/15/18)           CORTISONE (Verified  Allergy, Unknown, rash, 11/15/18)           DICYCLOMINE HCL (Verified  Allergy, Unknown, rash, 11/15/18)           DIPHENHYDRAMINE HCL (Verified  Allergy, Unknown, rash, 11/15/18)           EGG (Verified  Allergy, Unknown, POSITIVE REACTION ON ALLERGY TEST,     11/15/18)           ACETAMINOPHEN  "(Verified  Adverse Reaction, Unknown, \"DOESN'T MIX WELL WITH     OTHER MEDS\", 11/15/18)           DEXTROMETHORPHAN (Verified  Adverse Reaction, Unknown, \"DOESN'T MIX WELL     WITH OTHER MEDS\", 11/15/18)           DOXYLAMINE (Verified  Adverse Reaction, Unknown, \"DOESN'T MIX WELL WITH     OTHER MEDS\", 11/15/18)           IBUPROFEN (Verified  Adverse Reaction, Unknown, nausea, 11/15/18)           MORPHINE (Verified  Adverse Reaction, Unknown, itching, 11/15/18)           PSEUDOEPHEDRINE (Verified  Adverse Reaction, Unknown, \"DOESN'T MIX WELL     WITH OTHER MEDS\", 11/15/18)            Medications      Last Reconciled on 11/19/18 22:02 by PAULO SCHERER MD      Pyridoxine (Pyridoxine) 50 Mg Tablet      50 MG PO BID for 30 Days, #60 TAB 2 Refills         Prov: Paulo Scherer         11/15/18       Cyanocobalamin (Cyanocobalamin Injection) 1,000 Mcg/1 Ml Vial      1000 MCG SUBQ Q7DAY for 30 Days, #4 VIAL         Prov: Paulo Scherer         11/15/18       Ofloxacin (Floxin 0.3% Otic Soln) 5 Ml Drops      10 DROPS EAR LT QDAY for 7 Days, #1 BOTTLE         Prov: POP PHILIP cfe         8/8/18       Azithromycin Z-Mohinder (Zithromax Z-Mohinder) 250 Mg Tablet      250 MG PO ASDIR, #1 PKT         Prov: POP PHILIP cfe         8/8/18       Carbamide Peroxide (Ear Wax Removal*) Unknown Strength Drops      EAR EACH BID, #1 BOTTLE         Reported         8/8/18       Sucralfate (Sucralfate) 1 Gm Tablet      1 GM PO QIDAC, TAB         Reported         8/8/18       Sucralfate (Carafate) 1 Gm Tab      1 GM PO ACHS PRN for esophagitis for 30 Days, #120 TAB 6 Refills         Prov: Paulo Scherer         7/27/18       Carbamide Peroxide (Ear Wax Removal*) 15 Ml Drops      5 DROPS EAR EACH BID, #1 BOTTLE 1 Refill         Prov: Paulo Scherer         6/30/17       Ketotifen Fumarate (Alaway*) 10 Ml Drops      1 DROPS EYE EACH TID, #1 BOTTLE         Reported         6/6/17       Linaclotide (Linzess) 145 Mcg Capsule      290 MCG PO Q2D, CAP        "  Reported         6/6/17       Albuterol (Proair HFA) 8.5 Gm Inh      2 PUFFS INH QDAY PRN, #1 INH 0 Refills         Reported         9/30/16       Ondansetron HCl (Zofran*) 4 Mg Tablet      8 MG PO Q8H, TAB 0 Refills         Reported         9/27/16       Cetirizine Hcl (ZyrTEC) 10 Mg Tablet      10 MG PO QDAY, #30 TAB 0 Refills         Reported         9/27/16       Neb-Albuterol (Albuterol Neb) 2.5 Mg Solution      2.5 MG IH Q2D         Reported         11/5/10       Montelukast Sodium (Singulair*) 10 Mg Tab      10 MG PO QDAY         Reported         2/23/09      Medications Reviewed:  Changes made to meds            IMPRESSION/PLAN      Diagnosis      Anemia - D64.9      -Hemoglobin levels last time was 13.6.       -Worsening symptoms.       -Check CBC today.             Neutropenia - D70.9      -WBC last time was 6.04.       -No signs or symptoms of infection today.       -Check CBC today.             Folate deficiency - E53.8      -Continue folate supplementation      -Check folate at our next visit            Food allergy - Z91.018      -Patient and mother states that he has a history of allergy and has been     evaluated by Dr. Filipe Fraga.       -The requested a follow-up visit with Dr. Fraga to go over possible food and skin     allergy.       -Follow-up and September 5-2017 with Dr. Fraga            Gastroparesis - K31.84      -Patient will follow-up with GI.      -Stable. Continue close observation.            Rash and nonspecific skin eruption - R21      -Orders placed for dermatology      -Maculopapular rash on the hands and back            Notes      -Today's visit is an encounter for hematology evaluation and treatment.       -Patient's radiology exams, blood tests, physicians' notes, and medications were    reviewed today to assess patient's medical treatment plan.      -Radiology imaging tests from February 2018 to today were reviewed independently    by me by direct visualization of the images.        -Old  medical records were reviewed and summarized in chronological order in the     HPI today to maintain an updated medical record.      New Medications      * Pyridoxine 50 MG TABLET: 50 MG PO BID 30 Days #60      Renewed Medications      * Cyanocobalamin (Cyanocobalamin Injection) 1,000 MCG/1 ML VIAL: 1,000 MCG SUBQ       Q7DAY 30 Days #4      New Diagnostics      * CMP Comp Metabolic Panel, Routine         Dx: Anemia - D64.9      * CBC With Auto Diff, Routine         Dx: Anemia - D64.9      New Referrals      * Dermatology, As Soon As Possible         [facility]         Dx: Anemia - D64.9            Plan      Counseled patient to call us for an urgent visit if needed.  Check blood tests     and/or imaging tests as shown above.  Return to clinic in 3 months            Patient Education      Patient Education Provided:  Yes                 Disclaimer: Converted document may not contain table formatting or lab diagrams. Please see [employer] for the authenticated document.

## 2021-05-28 NOTE — PROGRESS NOTES
Patient: ANTHONY MANZANO     Acct: [id number]     Report: #[id number]  UNIT #: [id number]     : 1995    Encounter Date:2018  PRIMARY CARE: CECIL COTTON  ***Signed***  --------------------------------------------------------------------------------------------------------------------  Chief Complaint      2018      ANEMIA            Current Plan      -Leukopenia      -Absolute neutrophil count 3200.  Absolute lymphocyte count decreased at 860.      -Spleen and liver ultrasound was normal      -Improved.  Continue vitamin B12 shots.      -WBC 6.03 today.             -Folate deficiency      -Vitamin B12 level is in the lower end of normal.        -Methylmalonic acid is normal. Homocystine level is increased. .      -Anti-intrinsic factor and anti-parietal antibody levels are normal.      -Increased folic acid to 2 g daily      -Likely due to decreased diet intake.        -Continue daily folate.             -Food allergy/skin allergy      -Patient and mother states that he has a history of allergy and has been     evaluated by Dr. Filipe Fraga.       -The requested a follow-up visit with Dr. Fraga to go over possible food and skin     allergy.       -Improved.  Continue follow up with Dr. Fraga            -Gastroparesis      -Patient follows up with Dr. Aviles.       -Taking Linzess every other day.      -Improved.             -Clinic Evaluation      -Patient's imaging exams, blood tests, physicians' notes, and any new findings     since our last visit were reviewed today to reassess patient's medical     treatment plan.      -Old medical records were reviewed and summarized in chronological order in the     HPI today to maintain an updated medical record.       -Patient's radiology imaging tests from our last visit were reviewed     independently by me by direct visualization of the images.        -Patients current lab tests and medications were carefully reviewed to evaluate     patient's  current treatment plan today.       -Patient was advised to call us right away if there are any new symptoms for an     urgent visit for further evaluation. Patient voiced understanding and agreed to     do so.      Notes      New Medications      * Capsaicin 0.075% 56.6 GM CREAM..G.: 1 APL TOPICAL BID #1      New Diagnostics      * CBC, As Soon As Possible       Dx: Anemia - D64.9      Intensive Monitor for Toxicity:  Labs Reviewed, Meds\Narcotics Reviewed      Labs Reviewed During Visit?:  Yes      Time Spent:  > 50% /Coord Care      Patient Education Provided:  Yes            ECOG      ECOG Performance Status:  0            History and Present Illness      This is a pleasant 22-year-old male who presents for evaluation of vitamin B12     deficiency, anemia, and weight loss.  Patient has a history of pelvic floor     dysfunction.             -October .  WBC 8.38.  Hemoglobin 14.2.  Platelet count 278,000.      -July .  WBC 8.24.  Hemoglobin 13.9.  Platelet count 270,000.      -March .  WBC 6.31.  Hemoglobin 13.9.  Platelet count 282,000.      -April 3-2017.  WBC 7.66.  Hemoglobin 4.66.  Platelet count 286,000.        -June .  WBC 7.67.  Hemoglobin 13.6.  Platelet count 263,000.      -July .  WBC 4.79.  Absolute neutrophil count 3200.  Absolute lymphocyte     count 860.  Hemoglobin 13.8.  Platelet count 255,000.      -January 9-2018.  WBC 7.2.  Hemoglobin 14.9.  Platelet count 276,000.  Vitamin     B12 581.  Folate 5.2      -March 9-2018.  Vitamin B-12 569.  Folate 3.5.      -June .  WBC 6.03.  Hemoglobin 14.3.  Platelet count 259,000            Vitals      Height 6 ft 1.00 in / 185.42 cm      Weight 126 lbs 8.705 oz / 57.4 kg      BSA 1.70 m2      BMI 16.7 kg/m2      Temperature 97.3 F / 36.28 C - Temporal      Pulse 88      Blood Pressure 98/66 Sitting, Left Arm      Pulse Oximetry 99%, ROOM AIR            Allergies      Coded Allergies:             AMOXICILLIN  "(Verified  Allergy, Unknown, reddness,rash, 6/29/18)           CORTISONE (Verified  Allergy, Unknown, rash, 6/29/18)           DICYCLOMINE HCL (Verified  Allergy, Unknown, rash, 6/29/18)           DIPHENHYDRAMINE HCL (Verified  Allergy, Unknown, rash, 6/29/18)           EGG (Verified  Allergy, Unknown, POSITIVE REACTION ON ALLERGY TEST, 6/29/18    )           ACETAMINOPHEN (Verified  Adverse Reaction, Unknown, \"DOESN'T MIX WELL WITH     OTHER MEDS\", 6/29/18)           DEXTROMETHORPHAN (Verified  Adverse Reaction, Unknown, \"DOESN'T MIX WELL     WITH OTHER MEDS\", 6/29/18)           DOXYLAMINE (Verified  Adverse Reaction, Unknown, \"DOESN'T MIX WELL WITH     OTHER MEDS\", 6/29/18)           IBUPROFEN (Verified  Adverse Reaction, Unknown, nausea, 6/29/18)           MORPHINE (Verified  Adverse Reaction, Unknown, itching, 6/29/18)           PSEUDOEPHEDRINE (Verified  Adverse Reaction, Unknown, \"DOESN'T MIX WELL     WITH OTHER MEDS\", 6/29/18)            Medications      Last Reconciled on 4/3/17 19:49 by PAULO SCHERER MD      Folic Acid (Folic Acid*) 1 Mg Tablet      1 MG PO BID, #60 TAB 0 Refills         Prov: Paulo Scherer         4/3/18       Pyridoxine Hcl (Vitamin B-6*) 50 Mg Tablet      50 MG PO BID for 30 Days, #60 TAB 2 Refills         Prov: SAMINA FALL onc         3/9/18       Loperamide (Loperamide) 1 Mg/5 Ml Bottle      2 MG PO ASDIR, BOTTLE         Reported         2/20/18       Folic Acid (Folic Acid*) 1 Mg Tablet      1 MG PO BID, #60 TAB 2 Refills         Prov: Paulo Scherer         1/10/18       Folic Acid/Multivits-Min/Lut (Multi-Vitamin) 1 Tab Tablet      1 TAB PO QDAY, #30 TAB 1 Refill         Prov: Paulo Scherer         1/9/18       Loperamide (Loperamide) 2 Mg Capsule      2 MG PO Q4H, #30 TAB         Prov: Paulo Scherer         1/9/18       Cyanocobalamin (Cyanocobalamin Injection) 1,000 Mcg/1 Ml Vial      1000 MCG SUBQ Q7DAY for 30 Days, #4 VIAL         Reported         10/4/17     "   Cholecalciferol (Vitamin D3) 5,000 Unit Capsule      5000 UNITS PO QDAY for 30 Days, #30 CAP 2 Refills         Prov: Bernard Scherer         10/4/17       Carbamide Peroxide (Ear Wax Removal*) 15 Ml Drops      5 DROPS EAR EACH BID, #1 BOTTLE 1 Refill         Prov: Bernard Scherer         6/30/17       Ketotifen Fumarate (Alaway*) 10 Ml Drops      1 DROPS EYE EACH TID, #1 BOTTLE         Reported         6/6/17       Linaclotide (Linzess) 145 Mcg Capsule      290 MCG PO Q2D, CAP         Reported         6/6/17       Albuterol (Proair HFA*) 8.5 Gm Inh      2 PUFFS INH QDAY PRN, #1 INH 0 Refills         Reported         9/30/16       Ondansetron HCl (Zofran*) 4 Mg Tablet      8 MG PO Q8H, TAB 0 Refills         Reported         9/27/16       Cetirizine Hcl (ZyrTEC*) 10 Mg Tablet      10 MG PO QDAY, #30 TAB 0 Refills         Reported         9/27/16       Neb-Albuterol (Albuterol Neb) 2.5 Mg Solution      2.5 MG IH Q2D         Reported         11/5/10       Montelukast Sodium (Singulair*) 10 Mg Tab      10 MG PO QDAY         Reported         2/23/09            General Information      Primary Provider:  CECIL COTTON      [facility] Provider 2:  ERIC GUILLEN            Chemo Status      On Oral Chemotherapy?:  No            PAST, FAMILY   Past Medical History      Past Medical History:  No Diabetes Type 1, No Diabetes Type 2, No Thyroid     Disease, No COPD, No Emphysema, No Hypertension, No Stroke, No High Cholesterol    , No Heart Attack, No Bleeding Condition, Yes Low or High RBC Count, No Low or     High WBC Count, No Low or High Platelet Coun, No Hepatitis, No Kidney Disease,     Yes Depression, No Alzheimer's Disease, No Mental Disease, No Seizures, No     Arthritis, No Osteoporosis, No Osteopenia, No Short of Air, No Sleep apnea, No     Liver Disease, No STD, No Enlarged Prostate, Yes Other (Anxiety, no pevlic floor    - patient stated that he has never had tx for this)      Hematology/oncology:  REPORTS HX OF: Anemia, Other  hematologic history (vitamin     B12 deficiency), DENIES HX OF: Previous Treatment for CA, Bladder Cancer, Blood     cancer, Brain cancer, Breast cancer, Coagulopathy, Colon Cancer, Colorectal     cancer, Endocrine cancer, Eye cancer, GI cancer,  cancer, Kidney cancer,     Leukemia, Leukocytosis, Leukopenia, Liver cancer, Lung cancer, Lymphoma,     Musculoskeletal cancer, Myeloma, Neurologic cancer, Oral cancer, Pancreatic     Cancer, Prostate cancer, Skin cancer, Stomach cancer, Testicular cancer,     Thrombocytopenia, Thyroid cancer, Other cancer history      Genetic/metabolic:  DENIES HX OF: Cystic fibrosis, Down syndrome, Other genetic     history, Other metabolic history            Past Surgical History      REPORTS HX OF: Cholecystectomy, Bladder surgery, DENIES HX OF: Cataract     extraction, Thyroid surgery, Lung biopsy, CABG surgery, Coronary stent, Valve     replacement, Appendectomy, Splenectomy, Nephrectomy, Joint replacement, Frature     repair, Skin cancer removal, Melanoma excision, Spinal surgery, Breast biopsy,     Lumpectomy, Mastectomy, bilateral, Mastectomy, right, Mastectomy, left,     Hysterectomy, Peg Tube Placement, VAD Placement, Biopsy, Other Past Surgical Hx            Family History      DENIES HX OF: Anemia, Blood disorders, Blood Cancer, Breast cancer, Cervical     cancer, Coagulopathy, Colorectal cancer, Endocrine Cancer, Eye Cancer, GI Cancer    ,  Cancer, Kidney Cancer, Leukemia, Leukocytosis, Leukopenia, Liver Cancer,     Lung cancer, Lymphoma, Melanoma, Musculoskeletal Cancer, Myeloma, Neurologic     Cancer, Oral Cancer, Ovarian cancer, Prostate cancer, Skin Cancer, Stomach     Cancer, Testicular Cancer, Thrombocytopenia, Thyroid cancer, Uterine cancer,     Other Cancer History, Other Hematology History            Social History      Lives independently:  No            Tobacco Use      Tobacco status:  Never smoker            Alcohol Use      Alcohol intake:  None       Counseling given:  None            Substance Use      Substance use:  Denies use      Counseling given:  None            REVIEW OF SYSTEMS      General:  Denies: Appetite change, Excessive sweating, Fatigue, Fever, Night     sweats, Weight gain, Weight loss, Other      Eyes:  Denies: Blurred vision, Corrective lenses, Diplopia, Eye irritation, Eye     pain, Eye redness, Spots in vision, Vision loss, Other      Ears, nose, mouth, throat:  Denies: Headache, Seizures, Visual Changes, Hearing     loss, Sinus Congestion, Hoarseness, Sore throat, Other      Cardiovascular:  Denies: Chest pain, Irregular heartbeat, Palpitations, Swollen     ankles/legs, Other      Respiratory:  Denies: Chest pain, Shortness of Air, Productive cough, Coughing     blood, Other      Gastrointestinal:  Complains of: Nausea, Denies: Vomiting, Problem swallowing,     Frequent heartburn, Constipation, Diarrhea, Tarry stools, Bloody stools, Unable     to control bowels, Other      Kidney/Bladder:  Denies: Painful Urination, Blood in Urine, Incontinence,     Frequent Urination, Decreased Urine Stream, Other      Musculoskeletal:  Denies: New Back pain, Leg Cramps, Painful Joints, Swollen     Joints, Muscle Pain, Muscle weakness, Other      Skin:  DENIES: Bruises Easily, Hair changes, Lesions/changes in moles, Nail     changes, Pigment changes, Rash, Other      Neurological:  Complains of: Numbness\Tingling (HANDS), Denies: Dizziness,     Fainting, Paralysis, Seizures, Other      Psychiatric:  Complains of: AAO X 3, Denies: Anxiety, Panic attacks, Depression    , Memory loss, Other      Endocrine:  DiabetesThyroid DisorderOsteoporosisEndocrine Other      Hematologic/lymphatic:  Denies: Bruising, Bleeding, Enlarged Lymph Nodes,     Recurrent infections, Other            General Appearance:  Alert, Oriented X3, Cooperative, No acute distress      Eyes:  Anicteric Sclerae, Moist Conjunctiva, PERRLA      HEENT:  Orophraynx clear, No Erythema, Pallor       Neck:  Supple, Full ROM, No Carotid Bruits      Respiratory:  CTAB      Breast\Chest:  Symmetrical      Abdomen\Gastro:  Soft, No NABS, No Masses, No Hernias, No Hepatosplenomegaly      Cardio:  RRR, No Murmur, No, Peripheral Edema, Normal PMI      Skin:  Normal Temperature, Normal Tone, Normal Texture and Turgor, No Induration    , No Subcutaneous Nodules, No Rash, lesions, ulcers      Psychiatric:  Appropriate Affect, Intact Judgement, AAO x3      Neuro:  Cranial Nerve II-XII Inta, No Focal Sensory Deficit      Genitourinary:  No Rainey Catheter, No Bladder Distention, No Prostate     Enlargement, No Suprapubic Tenderness      Muscularskeletal:  Normal Gait and Station, Full ROM of extremeties, Full     muscle strength\tone      Extremities:  No Digital Cyanosis, Pedal Pulses Intact, Pedal Pulses Symetrical    , Normal Gait and station      Lymphatic:  No Cervical, No Supraclavicular, No Infraclavicular, No Axillary            PREVENTION      Hx Influenza Vaccination:  No      Influenza Vaccine Declined:  No      2 or More Falls Past Year?:  No      Fall Past Year with Injury?:  No      Hx Pneumococcal Vaccination:  No      Encouraged to follow-up with:  PCP regarding preventative exams.      Chart initiated by      GREGORIO TERRY CMA                 Disclaimer: Converted document may not contain table formatting or lab diagrams. Please see [employer] for the authenticated document.

## 2021-05-28 NOTE — PROGRESS NOTES
"Patient: ANTHONY MANZANO     Acct: [id number]     Report: #[id number]  UNIT #: [id number]     : 1995    Encounter Date:2018  PRIMARY CARE: CECIL COTTON  ***Signed***  --------------------------------------------------------------------------------------------------------------------  Visit Type      Established Patient Visit            Chief Complaint      ANEMIA            Allergies      Coded Allergies:             AMOXICILLIN (Verified  Allergy, Unknown, reddness,rash, 18)           CORTISONE (Verified  Allergy, Unknown, rash, 18)           DICYCLOMINE HCL (Verified  Allergy, Unknown, rash, 18)           DIPHENHYDRAMINE HCL (Verified  Allergy, Unknown, rash, 18)           EGG (Verified  Allergy, Unknown, POSITIVE REACTION ON ALLERGY TEST, 18    )           ACETAMINOPHEN (Verified  Adverse Reaction, Unknown, \"DOESN'T MIX WELL WITH     OTHER MEDS\", 18)           DEXTROMETHORPHAN (Verified  Adverse Reaction, Unknown, \"DOESN'T MIX WELL     WITH OTHER MEDS\", 18)           DOXYLAMINE (Verified  Adverse Reaction, Unknown, \"DOESN'T MIX WELL WITH     OTHER MEDS\", 18)           IBUPROFEN (Verified  Adverse Reaction, Unknown, nausea, 18)           MORPHINE (Verified  Adverse Reaction, Unknown, itching, 18)           PSEUDOEPHEDRINE (Verified  Adverse Reaction, Unknown, \"DOESN'T MIX WELL     WITH OTHER MEDS\", 18)            Medications      Last Reconciled on 18 20:11 by PAULO SCHERER MD      Sucralfate (Carafate) 1 Gm Tab      1 GM PO ACHS Y for esophagitis for 30 Days, #120 TAB 6 Refills         Prov: Paulo Scherer         18       Capsaicin 0.075% (Capsaicin 0.075%) 56.6 Gm Cream..g.      1 APL TOPICAL BID, #1 TUBE 1 Refill         Prov: Paulo Scherer         18       Folic Acid (Folic Acid*) 1 Mg Tablet      1 MG PO BID, #60 TAB 0 Refills         Prov: Paulo Scherer         4/3/18       Pyridoxine Hcl (Vitamin B-6*) 50 Mg " Tablet      50 MG PO BID for 30 Days, #60 TAB 2 Refills         Prov: SAMINA FALL onc         3/9/18       Loperamide (Loperamide) 1 Mg/5 Ml Bottle      2 MG PO ASDIR, BOTTLE         Reported         2/20/18       Folic Acid (Folic Acid*) 1 Mg Tablet      1 MG PO BID, #60 TAB 2 Refills         Prov: Bernard Scherer         1/10/18       Folic Acid/Multivits-Min/Lut (Multi-Vitamin) 1 Tab Tablet      1 TAB PO QDAY, #30 TAB 1 Refill         Prov: Bernard Scherer         1/9/18       Loperamide (Loperamide) 2 Mg Capsule      2 MG PO Q4H, #30 TAB         Prov: Bernard Scherer         1/9/18       Cyanocobalamin (Cyanocobalamin Injection) 1,000 Mcg/1 Ml Vial      1000 MCG SUBQ Q7DAY for 30 Days, #4 VIAL         Reported         10/4/17       Cholecalciferol (Vitamin D3) 5,000 Unit Capsule      5000 UNITS PO QDAY for 30 Days, #30 CAP 2 Refills         Prov: Bernard Scherer         10/4/17       Carbamide Peroxide (Ear Wax Removal*) 15 Ml Drops      5 DROPS EAR EACH BID, #1 BOTTLE 1 Refill         Prov: Bernard Scherer         6/30/17       Ketotifen Fumarate (Alaway*) 10 Ml Drops      1 DROPS EYE EACH TID, #1 BOTTLE         Reported         6/6/17       Linaclotide (Linzess) 145 Mcg Capsule      290 MCG PO Q2D, CAP         Reported         6/6/17       Albuterol (Proair HFA*) 8.5 Gm Inh      2 PUFFS INH QDAY PRN, #1 INH 0 Refills         Reported         9/30/16       Ondansetron HCl (Zofran*) 4 Mg Tablet      8 MG PO Q8H, TAB 0 Refills         Reported         9/27/16       Cetirizine Hcl (ZyrTEC*) 10 Mg Tablet      10 MG PO QDAY, #30 TAB 0 Refills         Reported         9/27/16       Neb-Albuterol (Albuterol Neb) 2.5 Mg Solution      2.5 MG IH Q2D         Reported         11/5/10       Montelukast Sodium (Singulair*) 10 Mg Tab      10 MG PO QDAY         Reported         2/23/09      Medications Reviewed:  Changes made to meds            History and Present Illness      Past Oncology Illness History      This is a pleasant  22-year-old male who presents for evaluation of vitamin B12     deficiency, anemia, and weight loss.  Patient has a history of pelvic floor     dysfunction.             -October .  WBC 8.38.  Hemoglobin 14.2.  Platelet count 278,000.      -July .  WBC 8.24.  Hemoglobin 13.9.  Platelet count 270,000.      -March .  WBC 6.31.  Hemoglobin 13.9.  Platelet count 282,000.      -April 3-2017.  WBC 7.66.  Hemoglobin 4.66.  Platelet count 286,000.        -June .  WBC 7.67.  Hemoglobin 13.6.  Platelet count 263,000.      -July .  WBC 4.79.  Absolute neutrophil count 3200.  Absolute lymphocyte     count 860.  Hemoglobin 13.8.  Platelet count 255,000.      -January 9-2018.  WBC 7.2.  Hemoglobin 14.9.  Platelet count 276,000.  Vitamin     B12 581.  Folate 5.2      -March 9-2018.  Vitamin B-12 569.  Folate 3.5.      -June .  WBC 6.03.  Hemoglobin 14.3.  Platelet count 259,000            HPI - Hematology      CJ returns to clinic for follow of his anemia and vitamin B12 deficiencies.  He     has lost approximately 4lbs since last visit.  Continues to experience     epigastric discomfort, n/v, bloating and constipation interchanged with     diarrhea.  Reports fatigue.  Informed his perianal area is very excoriated and     has seen blood in stool.  Will be returning to [place] for GI there; however    , mother is unable to get there until mid-September.  Denies overt chest pain/    heaviness   arm at times.  Capsaicin prescribed not covered by [employer]-informed mother it     is OTC and must locate pharmacy who has in stock.  CBC wnl. Iron panel stable.      Vitamin B12 wnl.  Folate 2.3--strongly encouraged to take Folic acid, as level     is at lowest.            Clinical Trial Participant      No            ECOG Performance Status      0            PAST, FAMILY   Past Medical History      Past Medical History:  No Diabetes Type 1, No Diabetes Type 2, No Thyroid     Disease, No COPD, No  Emphysema, No Hypertension, No Stroke, No High Cholesterol    , No Heart Attack, No Bleeding Condition, Low or High RBC Count, No Low or High     WBC Count, No Low or High Platelet Coun, No Hepatitis, No Kidney Disease,     Depression, No Alzheimer's Disease, No Mental Disease, No Seizures, No Arthritis    , No Osteoporosis, No Osteopenia, No Short of Air, No Sleep apnea, No Liver     Disease, No STD, No Enlarged Prostate, Other (Anxiety, no pevlic floor- patient     stated that he has never had tx for this)      Hematology/oncology:  REPORTS HX OF: Anemia, Other hematologic history (vitamin     B12 deficiency), DENIES HX OF: Previous Treatment for CA, Bladder Cancer, Blood     cancer, Brain cancer, Breast cancer, Coagulopathy, Colon Cancer, Colorectal     cancer, Endocrine cancer, Eye cancer, GI cancer,  cancer, Kidney cancer,     Leukemia, Leukocytosis, Leukopenia, Liver cancer, Lung cancer, Lymphoma,     Musculoskeletal cancer, Myeloma, Neurologic cancer, Oral cancer, Pancreatic     Cancer, Prostate cancer, Skin cancer, Stomach cancer, Testicular cancer,     Thrombocytopenia, Thyroid cancer, Other cancer history      Genetic/metabolic:  DENIES HX OF: Cystic fibrosis, Down syndrome, Other genetic     history, Other metabolic history            Past Surgical History      REPORTS HX OF: Cholecystectomy, Bladder surgery, DENIES HX OF: Cataract     extraction, Thyroid surgery, Lung biopsy, CABG surgery, Coronary stent, Valve     replacement, Appendectomy, Splenectomy, Nephrectomy, Joint replacement, Frature     repair, Skin cancer removal, Melanoma excision, Spinal surgery, Breast biopsy,     Lumpectomy, Mastectomy, bilateral, Mastectomy, right, Mastectomy, left,     Hysterectomy, Peg Tube Placement, VAD Placement, Biopsy, Other Past Surgical Hx            Family History      DENIES HX OF: Anemia, Blood disorders, Blood Cancer, Breast cancer, Cervical     cancer, Coagulopathy, Colorectal cancer, Endocrine Cancer,  Eye Cancer, GI Cancer    ,  Cancer, Kidney Cancer, Leukemia, Leukocytosis, Leukopenia, Liver Cancer,     Lung cancer, Lymphoma, Melanoma, Musculoskeletal Cancer, Myeloma, Neurologic     Cancer, Oral Cancer, Ovarian cancer, Prostate cancer, Skin Cancer, Stomach     Cancer, Testicular Cancer, Thrombocytopenia, Thyroid cancer, Uterine cancer,     Other Cancer History, Other Hematology History            Social History      Lives independently:  No      Occupation:  lives with mother            Tobacco Use      Tobacco status:  Never smoker            Alcohol Use      Alcohol intake:  None            Substance Use      Substance use:  Denies use            REVIEW OF SYSTEMS      General:  Complains of: Fatigue, Night sweats, Weight loss, Denies: Appetite     change, Excessive sweating, Fever, Weight gain, Other      Eyes:  Denies: Blurred vision, Corrective lenses, Diplopia, Eye irritation, Eye     pain, Eye redness, Spots in vision, Vision loss, Other      Ears, nose, mouth, throat:  Denies: Headache, Seizures, Visual Changes, Hearing     loss, Sinus Congestion, Hoarseness, Sore throat, Other      Cardiovascular:  Complains of: Chest pain, Denies: Irregular heartbeat,     Palpitations, Swollen ankles/legs, Other      Respiratory:  Denies: Chest pain, Shortness of Air, Productive cough, Coughing     blood, Other      Gastrointestinal:  Complains of: Nausea, Vomiting, Frequent heartburn,     Constipation, Diarrhea, Denies: Problem swallowing, Tarry stools, Bloody stools    , Unable to control bowels, Other      Kidney/Bladder:  Denies: Painful Urination, Blood in Urine, Incontinence,     Frequent Urination, Decreased Urine Stream, Other      Musculoskeletal:  Denies: New Back pain, Leg Cramps, Painful Joints, Swollen     Joints, Muscle Pain, Muscle weakness, Other      Skin:  DENIES: Bruises Easily, Hair changes, Lesions/changes in moles, Nail     changes, Pigment changes, Rash, Other      Neurological:  Complains of:  Numbness\Tingling (bilateral hands and left arm),     Denies: Dizziness, Fainting, Paralysis, Seizures, Other      Psychiatric:  Complains of: AAO X 3, Depression, Denies: Anxiety, Panic attacks    , Memory loss, Other      Endocrine:  DiabetesThyroid DisorderOsteoporosisEndocrine Other      Hematologic/lymphatic:  Denies: Bruising, Bleeding, Enlarged Lymph Nodes,     Recurrent infections, Other            VITAL SIGNS,PAIN/FATIGUE SCORE      Vitals      Height 6 ft 1.00 in / 185.42 cm      Weight 122 lbs 12.740 oz / 55.7 kg      BSA 1.67 m2      BMI 16.2 kg/m2      Temperature 98.1 F / 36.72 C - Temporal      Pulse 64      Respirations 16      Blood Pressure 104/64 Sitting, Right Arm      Pulse Oximetry 99%, RM AIR            Pain Score      Experiencing any pain?:  Yes      Pain Scale Used:  Numerical      Pain Intensity:  3            Fatigue Score      Experiencing any fatigue?:  Yes      Fatigue (0-10 scale):  7            General Appearance:  Alert, Oriented X3, Cooperative, No acute distress, No     Obese      Eyes:  Anicteric Sclerae, Moist Conjunctiva      HEENT:  Orophraynx clear, No Erythema, No Exudates, No Pallor, No Scleral     Icterus, No Thrush      Neck:  Supple, Full ROM      Respiratory:  CTAB, No Diminished Breath, No Rales, No Crackels, No Stridor, No     Rhonchi      Breast\Chest:  Symmetrical      Abdomen\Gastro:  Soft, No NABS      Cardio:  RRR, No Murmur, No, Peripheral Edema      Skin:  Normal Temperature, Normal Tone, Normal Texture and Turgor, No Rash,     lesions, ulcers      Psychiatric:  Appropriate Affect, Intact Judgement, AAO x3      Neuro:  Cranial Nerve II-XII Inta, No Focal Sensory Deficit      Genitourinary:  No Rainey Catheter      Muscularskeletal:  Normal Gait and Station, Full ROM of extremeties, Full     muscle strength\tone      Extremities:  No Digital Cyanosis, No Digital Ischemia, Normal Gait and station    , No Weakness      Lymphatic:  No Cervical, No Supraclavicular,  No Infraclavicular, No Axillary            PREVENTION      Hx Influenza Vaccination:  No      Influenza Vaccine Declined:  No      2 or More Falls Past Year?:  No      Fall Past Year with Injury?:  No      Hx Pneumococcal Vaccination:  No      Encouraged to follow-up with:  PCP regarding preventative exams.      Chart initiated by      ANJU ALFARO MA            IMPRESSION/PLAN      Plan      -Anemia, Weight loss   -Worsening fatigue       -Check CBC, CMP, iron profile/ferritin         -Esophagitis      -Carafate escribed to assist with esophagitis      -Return to [facility] when transportation can be arranged      -Encouraged to use diaper ointment to rectum to manage irritation      -Strongly encouraged to take medications as tolerated (hoping Carafate will help    )      -Follow-up in 3 months            -Today's Evaluation      -Patient's imaging exams, blood tests, physicians' notes, and any new findings     since our last visit were reviewed today to reassess patient's medical     treatment plan.      -Old medical records were reviewed and summarized in chronological order in the     HPI today to maintain an updated medical record.       -Patient's radiology imaging tests from our last visit were reviewed     independently by me by direct visualization of the images.        -Patients current lab tests and medications were carefully reviewed to evaluate     patient's current treatment plan today.       -Patient was advised to call us right away if there are any new symptoms for an     urgent visit for further evaluation. Patient voiced understanding and agreed to     do so.            Diagnosis      Anemia       Anemia due to vitamin B12 deficiency, unspecified B12 deficiency type - D51.9       Anemia type: B12 deficiency       Vitamin B12 deficiency anemia type: unspecified B12 deficiency            Weight loss - R63.4            GERD with esophagitis - K21.0            Notes      New Medications      *  Sucralfate (Carafate) 1 GM TAB: 1 GM PO ACHS PRN esophagitis 30 Days #120      New Diagnostics      * Comp Metabolic Panel       Dx: Anemia - D64.9      * Iron Profile       Dx: Anemia - D64.9      * CBC       Dx: Anemia - D64.9      * B12    Dx: Anemia - D64.9      * Ferritin Level, As Soon As Possible       Dx: Anemia - D64.9      * Path Review Peripheral Smear, Stat       Dx: Anemia - D64.9            Patient Education      Patient Education Provided:  Yes                 Disclaimer: Converted document may not contain table formatting or lab diagrams. Please see [employer] for the authenticated document.

## 2021-06-15 ENCOUNTER — HOSPITAL ENCOUNTER (EMERGENCY)
Facility: HOSPITAL | Age: 26
Discharge: HOME | End: 2021-06-15
Attending: EMERGENCY MEDICINE | Admitting: EMERGENCY MEDICINE

## 2021-06-15 ENCOUNTER — APPOINTMENT (OUTPATIENT)
Dept: CT IMAGING | Facility: HOSPITAL | Age: 26
End: 2021-06-15

## 2021-06-15 VITALS
SYSTOLIC BLOOD PRESSURE: 120 MMHG | WEIGHT: 145.28 LBS | HEIGHT: 75 IN | OXYGEN SATURATION: 98 % | BODY MASS INDEX: 18.06 KG/M2 | HEART RATE: 86 BPM | TEMPERATURE: 98.7 F | DIASTOLIC BLOOD PRESSURE: 76 MMHG | RESPIRATION RATE: 20 BRPM

## 2021-06-15 DIAGNOSIS — S09.90XA INJURY OF HEAD, INITIAL ENCOUNTER: Primary | ICD-10-CM

## 2021-06-15 DIAGNOSIS — S01.81XA FACIAL LACERATION, INITIAL ENCOUNTER: ICD-10-CM

## 2021-06-15 PROCEDURE — 70450 CT HEAD/BRAIN W/O DYE: CPT

## 2021-06-15 PROCEDURE — 99283 EMERGENCY DEPT VISIT LOW MDM: CPT

## 2021-06-15 RX ORDER — MOMETASONE FUROATE 50 UG/1
SPRAY, METERED NASAL
COMMUNITY
End: 2022-01-03 | Stop reason: SDUPTHER

## 2021-06-15 RX ORDER — ACETAMINOPHEN 325 MG/1
TABLET ORAL
COMMUNITY

## 2021-06-15 RX ORDER — ACETAMINOPHEN 325 MG/1
975 TABLET ORAL ONCE
Status: COMPLETED | OUTPATIENT
Start: 2021-06-15 | End: 2021-06-15

## 2021-06-15 RX ORDER — OMEPRAZOLE 40 MG/1
CAPSULE, DELAYED RELEASE ORAL
COMMUNITY
End: 2021-07-01 | Stop reason: SDUPTHER

## 2021-06-15 RX ORDER — LANOLIN ALCOHOL/MO/W.PET/CERES
CREAM (GRAM) TOPICAL
COMMUNITY
End: 2022-01-03 | Stop reason: SDUPTHER

## 2021-06-15 RX ORDER — FLUTICASONE PROPIONATE AND SALMETEROL XINAFOATE 230; 21 UG/1; UG/1
AEROSOL, METERED RESPIRATORY (INHALATION)
COMMUNITY
End: 2021-09-02

## 2021-06-15 RX ORDER — SUCRALFATE 1 G/1
TABLET ORAL
COMMUNITY
End: 2022-01-03 | Stop reason: SDUPTHER

## 2021-06-15 RX ORDER — KETOTIFEN FUMARATE 0.35 MG/ML
1 SOLUTION/ DROPS OPHTHALMIC
COMMUNITY

## 2021-06-15 RX ORDER — BEPOTASTINE BESILATE 15 MG/ML
1 SOLUTION/ DROPS OPHTHALMIC
COMMUNITY

## 2021-06-15 RX ORDER — ALCAFTADINE 0.25 %
1 DROPS OPHTHALMIC (EYE)
COMMUNITY

## 2021-06-15 RX ORDER — ALBUTEROL SULFATE 0.63 MG/3ML
SOLUTION RESPIRATORY (INHALATION)
COMMUNITY
End: 2022-10-11 | Stop reason: SDUPTHER

## 2021-06-15 RX ORDER — LOPERAMIDE HYDROCHLORIDE 2 MG/1
TABLET ORAL
COMMUNITY

## 2021-06-15 RX ADMIN — ACETAMINOPHEN 975 MG: 325 TABLET ORAL at 12:24

## 2021-06-15 NOTE — ED PROVIDER NOTES
Subjective   Hit head on cabinet and cut right eyebrow.        History provided by:  Patient  Facial Laceration  Location:  Face  Facial laceration location:  Face  Depth:  Cutaneous  Laceration mechanism:  Blunt object  Pain details:     Quality:  Aching    Severity:  Mild    Timing:  Constant    Progression:  Unchanged  Foreign body present:  No foreign bodies  Relieved by:  None tried  Ineffective treatments:  None tried      Review of Systems   All other systems reviewed and are negative.      Past Medical History:   Diagnosis Date   • Anxiety    • Asthma    • Gastroparesis    • Panic attacks        Allergies   Allergen Reactions   • Nuts Shortness Of Breath   • Amoxicillin Itching   • Eggs Or Egg-Derived Products Itching   • Hydrocortisone Rash   • Ibuprofen Rash   • Soybean Oil Rash   • Wheat Rash       History reviewed. No pertinent surgical history.    History reviewed. No pertinent family history.    Social History     Socioeconomic History   • Marital status: Single     Spouse name: Not on file   • Number of children: Not on file   • Years of education: Not on file   • Highest education level: Not on file   Tobacco Use   • Smoking status: Never Smoker   Substance and Sexual Activity   • Alcohol use: Never   • Drug use: Never           Objective   Physical Exam  Vitals and nursing note reviewed.   Constitutional:       Appearance: Normal appearance.   HENT:      Head:      Comments: Right eyebrow with superficial laceration     Nose: Nose normal.      Mouth/Throat:      Mouth: Mucous membranes are dry.      Pharynx: Oropharynx is clear.   Eyes:      Pupils: Pupils are equal, round, and reactive to light.   Cardiovascular:      Rate and Rhythm: Normal rate and regular rhythm.      Pulses: Normal pulses.      Heart sounds: Normal heart sounds.   Pulmonary:      Effort: Pulmonary effort is normal.      Breath sounds: Normal breath sounds.   Abdominal:      General: Abdomen is flat.   Musculoskeletal:          General: Normal range of motion.      Cervical back: Normal range of motion.   Skin:     General: Skin is warm and dry.   Neurological:      General: No focal deficit present.      Mental Status: He is alert.   Psychiatric:         Mood and Affect: Mood normal.         Laceration Repair    Date/Time: 6/15/2021 12:32 PM  Performed by: Lis Brown APRN  Authorized by: Esteban Hayward MD     Consent:     Consent obtained:  Verbal    Consent given by:  Patient    Alternatives discussed:  No treatment  Anesthesia (see MAR for exact dosages):     Anesthesia method:  None  Laceration details:     Location:  Face    Face location:  L eyebrow    Wound length (cm): <0.5cm.    Laceration depth: very superficial.  Repair type:     Repair type:  Simple  Exploration:     Wound exploration: wound explored through full range of motion and entire depth of wound probed and visualized    Treatment:     Irrigation method:  Syringe    Visualized foreign bodies/material removed: no    Skin repair:     Repair method:  Tissue adhesive and Steri-Strips    Number of Steri-Strips: 1.  Post-procedure details:     Dressing:  Adhesive bandage    Patient tolerance of procedure:  Tolerated well, no immediate complications               ED Course                                           MDM  Number of Diagnoses or Management Options  Facial laceration, initial encounter: minor  Injury of head, initial encounter: minor     Amount and/or Complexity of Data Reviewed  Tests in the radiology section of CPT®: reviewed and ordered  Obtain history from someone other than the patient: yes (MOP)    Risk of Complications, Morbidity, and/or Mortality  Presenting problems: low  Diagnostic procedures: low  Management options: low    Patient Progress  Patient progress: stable      Final diagnoses:   Injury of head, initial encounter   Facial laceration, initial encounter       ED Disposition  ED Disposition     ED Disposition Condition Comment    Discharge  Luis Garcia PA  [address]  [address]  [place] [address]  [phone]      As needed         Medication List      No changes were made to your prescriptions during this visit.          Lis Brown, APRN  06/15/21 1363

## 2021-06-15 NOTE — ED TRIAGE NOTES
Pt states at 0230 this am he accidentally walked into the door frame and hit his right eyebrow area. He now c/o occasional dizziness and nausea, has a superficial lac to the area. He has some minor swelling to the zygomatic arch.

## 2021-06-30 PROBLEM — F32.A DEPRESSION: Status: ACTIVE | Noted: 2021-06-30

## 2021-06-30 PROBLEM — J30.9 ALLERGIC RHINITIS: Status: ACTIVE | Noted: 2021-06-30

## 2021-06-30 PROBLEM — J42 CHRONIC BRONCHITIS: Status: ACTIVE | Noted: 2021-06-30

## 2021-06-30 PROBLEM — Z55.9 EDUCATIONAL CIRCUMSTANCE: Status: ACTIVE | Noted: 2017-03-14

## 2021-07-01 ENCOUNTER — LAB (OUTPATIENT)
Dept: LAB | Facility: HOSPITAL | Age: 26
End: 2021-07-01

## 2021-07-01 ENCOUNTER — OFFICE VISIT (OUTPATIENT)
Dept: FAMILY MEDICINE CLINIC | Facility: CLINIC | Age: 26
End: 2021-07-01

## 2021-07-01 VITALS
HEIGHT: 75 IN | BODY MASS INDEX: 18.08 KG/M2 | OXYGEN SATURATION: 97 % | WEIGHT: 145.4 LBS | HEART RATE: 79 BPM | SYSTOLIC BLOOD PRESSURE: 101 MMHG | DIASTOLIC BLOOD PRESSURE: 66 MMHG

## 2021-07-01 DIAGNOSIS — S01.81XS: ICD-10-CM

## 2021-07-01 DIAGNOSIS — E55.9 VITAMIN D DEFICIENCY: ICD-10-CM

## 2021-07-01 DIAGNOSIS — K31.84 GASTROPARESIS: ICD-10-CM

## 2021-07-01 DIAGNOSIS — F48.9: ICD-10-CM

## 2021-07-01 DIAGNOSIS — R15.9 FECAL INCONTINENCE ALTERNATING WITH CONSTIPATION: ICD-10-CM

## 2021-07-01 DIAGNOSIS — G44.309 POST-TRAUMATIC HEADACHE, NOT INTRACTABLE, UNSPECIFIED CHRONICITY PATTERN: ICD-10-CM

## 2021-07-01 DIAGNOSIS — E53.8 VITAMIN B12 DEFICIENCY: Primary | ICD-10-CM

## 2021-07-01 DIAGNOSIS — F79 MENTAL IMPAIRMENT: ICD-10-CM

## 2021-07-01 DIAGNOSIS — K59.00 FECAL INCONTINENCE ALTERNATING WITH CONSTIPATION: ICD-10-CM

## 2021-07-01 DIAGNOSIS — S01.112S: ICD-10-CM

## 2021-07-01 DIAGNOSIS — E53.8 VITAMIN B12 DEFICIENCY: ICD-10-CM

## 2021-07-01 DIAGNOSIS — J30.9 ALLERGIC RHINITIS, UNSPECIFIED SEASONALITY, UNSPECIFIED TRIGGER: ICD-10-CM

## 2021-07-01 DIAGNOSIS — E53.8 FOLIC ACID DEFICIENCY: Primary | ICD-10-CM

## 2021-07-01 DIAGNOSIS — J45.909 ASTHMA, UNSPECIFIED ASTHMA SEVERITY, UNSPECIFIED WHETHER COMPLICATED, UNSPECIFIED WHETHER PERSISTENT: ICD-10-CM

## 2021-07-01 DIAGNOSIS — K20.0 ESOPHAGITIS, EOSINOPHILIC: ICD-10-CM

## 2021-07-01 LAB
25(OH)D3 SERPL-MCNC: 7.3 NG/ML
ALBUMIN SERPL-MCNC: 4.5 G/DL (ref 3.5–5.2)
ALBUMIN/GLOB SERPL: 1.6 G/DL
ALP SERPL-CCNC: 81 U/L (ref 39–117)
ALT SERPL W P-5'-P-CCNC: 6 U/L (ref 1–41)
ANION GAP SERPL CALCULATED.3IONS-SCNC: 9.6 MMOL/L (ref 5–15)
AST SERPL-CCNC: 14 U/L (ref 1–40)
BASOPHILS # BLD AUTO: 0.02 10*3/MM3 (ref 0–0.2)
BASOPHILS NFR BLD AUTO: 0.3 % (ref 0–1.5)
BILIRUB SERPL-MCNC: 0.7 MG/DL (ref 0–1.2)
BILIRUB UR QL STRIP: NEGATIVE
BUN SERPL-MCNC: 5 MG/DL (ref 6–20)
BUN/CREAT SERPL: 5.1 (ref 7–25)
CALCIUM SPEC-SCNC: 9.2 MG/DL (ref 8.6–10.5)
CHLORIDE SERPL-SCNC: 101 MMOL/L (ref 98–107)
CHOLEST SERPL-MCNC: 170 MG/DL (ref 0–200)
CLARITY UR: CLEAR
CO2 SERPL-SCNC: 27.4 MMOL/L (ref 22–29)
COLOR UR: YELLOW
CREAT SERPL-MCNC: 0.99 MG/DL (ref 0.76–1.27)
DEPRECATED RDW RBC AUTO: 39.6 FL (ref 37–54)
EOSINOPHIL # BLD AUTO: 0.18 10*3/MM3 (ref 0–0.4)
EOSINOPHIL NFR BLD AUTO: 3 % (ref 0.3–6.2)
ERYTHROCYTE [DISTWIDTH] IN BLOOD BY AUTOMATED COUNT: 12.4 % (ref 12.3–15.4)
FOLATE SERPL-MCNC: <2 NG/ML (ref 4.78–24.2)
GFR SERPL CREATININE-BSD FRML MDRD: 111 ML/MIN/1.73
GLOBULIN UR ELPH-MCNC: 2.8 GM/DL
GLUCOSE SERPL-MCNC: 85 MG/DL (ref 65–99)
GLUCOSE UR STRIP-MCNC: NEGATIVE MG/DL
HCT VFR BLD AUTO: 42.5 % (ref 37.5–51)
HDLC SERPL-MCNC: 57 MG/DL (ref 40–60)
HGB BLD-MCNC: 14.3 G/DL (ref 13–17.7)
HGB UR QL STRIP.AUTO: NEGATIVE
IMM GRANULOCYTES # BLD AUTO: 0.02 10*3/MM3 (ref 0–0.05)
IMM GRANULOCYTES NFR BLD AUTO: 0.3 % (ref 0–0.5)
KETONES UR QL STRIP: ABNORMAL
LDLC SERPL CALC-MCNC: 103 MG/DL (ref 0–100)
LDLC/HDLC SERPL: 1.81 {RATIO}
LEUKOCYTE ESTERASE UR QL STRIP.AUTO: NEGATIVE
LYMPHOCYTES # BLD AUTO: 1.87 10*3/MM3 (ref 0.7–3.1)
LYMPHOCYTES NFR BLD AUTO: 31.1 % (ref 19.6–45.3)
MCH RBC QN AUTO: 29.5 PG (ref 26.6–33)
MCHC RBC AUTO-ENTMCNC: 33.6 G/DL (ref 31.5–35.7)
MCV RBC AUTO: 87.8 FL (ref 79–97)
MONOCYTES # BLD AUTO: 0.38 10*3/MM3 (ref 0.1–0.9)
MONOCYTES NFR BLD AUTO: 6.3 % (ref 5–12)
NEUTROPHILS NFR BLD AUTO: 3.55 10*3/MM3 (ref 1.7–7)
NEUTROPHILS NFR BLD AUTO: 59 % (ref 42.7–76)
NITRITE UR QL STRIP: NEGATIVE
NRBC BLD AUTO-RTO: 0 /100 WBC (ref 0–0.2)
PH UR STRIP.AUTO: 6 [PH] (ref 5–8)
PLATELET # BLD AUTO: 325 10*3/MM3 (ref 140–450)
PMV BLD AUTO: 9.5 FL (ref 6–12)
POTASSIUM SERPL-SCNC: 3.9 MMOL/L (ref 3.5–5.2)
PROT SERPL-MCNC: 7.3 G/DL (ref 6–8.5)
PROT UR QL STRIP: NEGATIVE
RBC # BLD AUTO: 4.84 10*6/MM3 (ref 4.14–5.8)
SODIUM SERPL-SCNC: 138 MMOL/L (ref 136–145)
SP GR UR STRIP: 1.02 (ref 1–1.03)
TRIGL SERPL-MCNC: 48 MG/DL (ref 0–150)
TSH SERPL DL<=0.05 MIU/L-ACNC: 1.54 UIU/ML (ref 0.27–4.2)
UROBILINOGEN UR QL STRIP: ABNORMAL
VIT B12 BLD-MCNC: 967 PG/ML (ref 211–946)
VLDLC SERPL-MCNC: 10 MG/DL (ref 5–40)
WBC # BLD AUTO: 6.02 10*3/MM3 (ref 3.4–10.8)

## 2021-07-01 PROCEDURE — 81003 URINALYSIS AUTO W/O SCOPE: CPT

## 2021-07-01 PROCEDURE — 36415 COLL VENOUS BLD VENIPUNCTURE: CPT

## 2021-07-01 PROCEDURE — 90471 IMMUNIZATION ADMIN: CPT | Performed by: PHYSICIAN ASSISTANT

## 2021-07-01 PROCEDURE — 85025 COMPLETE CBC W/AUTO DIFF WBC: CPT

## 2021-07-01 PROCEDURE — 82306 VITAMIN D 25 HYDROXY: CPT

## 2021-07-01 PROCEDURE — 99214 OFFICE O/P EST MOD 30 MIN: CPT | Performed by: PHYSICIAN ASSISTANT

## 2021-07-01 PROCEDURE — 90715 TDAP VACCINE 7 YRS/> IM: CPT | Performed by: PHYSICIAN ASSISTANT

## 2021-07-01 PROCEDURE — 84443 ASSAY THYROID STIM HORMONE: CPT

## 2021-07-01 PROCEDURE — 80061 LIPID PANEL: CPT

## 2021-07-01 PROCEDURE — 80053 COMPREHEN METABOLIC PANEL: CPT

## 2021-07-01 PROCEDURE — 82746 ASSAY OF FOLIC ACID SERUM: CPT

## 2021-07-01 PROCEDURE — 82607 VITAMIN B-12: CPT

## 2021-07-01 RX ORDER — MONTELUKAST SODIUM 10 MG/1
10 TABLET ORAL NIGHTLY
COMMUNITY
End: 2021-07-01 | Stop reason: SDUPTHER

## 2021-07-01 RX ORDER — ERGOCALCIFEROL 1.25 MG/1
50000 CAPSULE ORAL WEEKLY
Qty: 5 CAPSULE | Refills: 5 | Status: SHIPPED | OUTPATIENT
Start: 2021-07-01 | End: 2022-01-03 | Stop reason: SDUPTHER

## 2021-07-01 RX ORDER — FOLIC ACID 5 MG
5 CAPSULE ORAL DAILY
Qty: 30 EACH | Refills: 5 | Status: SHIPPED | OUTPATIENT
Start: 2021-07-01 | End: 2022-01-03 | Stop reason: SDUPTHER

## 2021-07-01 RX ORDER — MONTELUKAST SODIUM 10 MG/1
10 TABLET ORAL NIGHTLY
Qty: 30 TABLET | Refills: 5 | Status: SHIPPED | OUTPATIENT
Start: 2021-07-01 | End: 2022-01-03 | Stop reason: SDUPTHER

## 2021-07-01 RX ORDER — FOLIC ACID 1 MG/1
1 TABLET ORAL DAILY
COMMUNITY
End: 2021-07-01

## 2021-07-01 RX ORDER — OMEPRAZOLE 40 MG/1
40 CAPSULE, DELAYED RELEASE ORAL DAILY
Qty: 30 CAPSULE | Refills: 5 | Status: SHIPPED | OUTPATIENT
Start: 2021-07-01 | End: 2022-01-03 | Stop reason: SDUPTHER

## 2021-07-01 RX ORDER — ONDANSETRON HYDROCHLORIDE 8 MG/1
TABLET, FILM COATED ORAL EVERY 8 HOURS PRN
COMMUNITY
End: 2022-01-03

## 2021-07-01 RX ORDER — LANOLIN ALCOHOL/MO/W.PET/CERES
1000 CREAM (GRAM) TOPICAL DAILY
COMMUNITY
End: 2022-01-03 | Stop reason: SDUPTHER

## 2021-07-01 RX ORDER — CETIRIZINE HYDROCHLORIDE 10 MG/1
10 TABLET ORAL DAILY
COMMUNITY
End: 2022-01-03 | Stop reason: SDUPTHER

## 2021-07-01 RX ORDER — EPINEPHRINE 0.3 MG/.3ML
INJECTION SUBCUTANEOUS ONCE
COMMUNITY

## 2021-07-01 RX ORDER — FOLIC ACID 5 MG
5 CAPSULE ORAL DAILY
COMMUNITY
End: 2021-07-01 | Stop reason: SDUPTHER

## 2021-07-01 RX ORDER — BUDESONIDE 0.25 MG/2ML
0.25 INHALANT ORAL
COMMUNITY
End: 2022-10-11 | Stop reason: SDUPTHER

## 2021-07-01 NOTE — PROGRESS NOTES
"Chief Complaint  Follow-up (ER follow up)    Subjective          Giselle Hathaway presents to [facility]  History of Present Illness    Giselle Hathaway is a 26 y.o. male who presents today for an ER follow up.     Pt reports he woke up around 2am to go to the restroom and hit his head on the corner of the cabinet. Pt went to ER 6/15 for head lac, per mom, ER said he needed stitches but they did not stitch. She stated they glued laceration.     Pt went [facility] yesterday (Dr. Wheeler), stated vision is good.     He c/o dizziness, headaches and blurred vision since head injury. CT at ER was normal.       Objective   Vital Signs:   /66 (BP Location: Left arm)   Pulse 79   Ht 190.5 cm (75\")   Wt 66 kg (145 lb 6.4 oz)   SpO2 97%   BMI 18.17 kg/m²     Physical Exam  Vitals and nursing note reviewed.   Constitutional:       Appearance: Normal appearance.   HENT:      Head: Normocephalic and atraumatic.   Cardiovascular:      Rate and Rhythm: Normal rate and regular rhythm.   Pulmonary:      Effort: Pulmonary effort is normal.      Breath sounds: Normal breath sounds.   Musculoskeletal:      Cervical back: Neck supple.   Skin:            Comments: Healing laceration along the right eyebrow   Neurological:      Mental Status: He is alert.   Psychiatric:         Mood and Affect: Mood normal.         Behavior: Behavior normal.        Result Review :                     Assessment and Plan    Diagnoses and all orders for this visit:    1. Vitamin B12 deficiency (Primary)  -     CBC & Differential; Future  -     Comprehensive Metabolic Panel; Future  -     Lipid Panel; Future  -     TSH Rfx On Abnormal To Free T4; Future  -     Vitamin D 25 Hydroxy; Future  -     Urinalysis With Microscopic If Indicated (No Culture) - Urine, Clean Catch; Future  -     Vitamin B12 & Folate; Future  -     Ambulatory Referral to Chronic Care Management    2. Vitamin D deficiency  -     " CBC & Differential; Future  -     Comprehensive Metabolic Panel; Future  -     Lipid Panel; Future  -     TSH Rfx On Abnormal To Free T4; Future  -     Vitamin D 25 Hydroxy; Future  -     Urinalysis With Microscopic If Indicated (No Culture) - Urine, Clean Catch; Future  -     Vitamin B12 & Folate; Future  -     Ambulatory Referral to Chronic Care Management    3. Gastroparesis  -     CBC & Differential; Future  -     Comprehensive Metabolic Panel; Future  -     Lipid Panel; Future  -     TSH Rfx On Abnormal To Free T4; Future  -     Vitamin D 25 Hydroxy; Future  -     Urinalysis With Microscopic If Indicated (No Culture) - Urine, Clean Catch; Future  -     Vitamin B12 & Folate; Future  -     Ambulatory Referral to Chronic Care Management    4. Post-traumatic headache, not intractable, unspecified chronicity pattern  -     CBC & Differential; Future  -     Comprehensive Metabolic Panel; Future  -     Lipid Panel; Future  -     TSH Rfx On Abnormal To Free T4; Future  -     Vitamin D 25 Hydroxy; Future  -     Urinalysis With Microscopic If Indicated (No Culture) - Urine, Clean Catch; Future  -     Vitamin B12 & Folate; Future  -     Tdap Vaccine Greater Than or Equal To 8yo IM    5. Laceration of eyebrow and forehead, left, sequela  -     Tdap Vaccine Greater Than or Equal To 8yo IM    6. Esophagitis, eosinophilic  -     Ambulatory Referral to Chronic Care Management    7. 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  -     Ambulatory Referral to Chronic Care Management    8. Mental impairment  -     Ambulatory Referral to Chronic Care Management    9. Asthma, unspecified asthma severity, unspecified whether complicated, unspecified whether persistent    10. Fecal incontinence alternating with constipation  Overview:  Controlled with linzess    Orders:  -     linaclotide (LINZESS) 290 MCG capsule capsule; Take 1 capsule by mouth Every Morning Before Breakfast.  Dispense: 30 capsule; Refill: 5     [facility] - GI  MD    Follow Up   Return in about 2 months (around 9/1/2021).  Patient was given instructions and counseling regarding his condition or for health maintenance advice. Please see specific information pulled into the AVS if appropriate.     DENNYS Gibbs

## 2021-07-02 ENCOUNTER — TELEPHONE (OUTPATIENT)
Dept: FAMILY MEDICINE CLINIC | Facility: CLINIC | Age: 26
End: 2021-07-02

## 2021-07-02 NOTE — TELEPHONE ENCOUNTER
----- Message from DENNYS Gibbs sent at 7/1/2021  7:58 PM EDT -----  Vit D Def - 50,000 IU qweekly  Folate Def - sent rx     These levels are extremely low - pt needs to get these levels up.  Recheck in 3 mos. Orders placed.

## 2021-08-05 DIAGNOSIS — R15.9 FECAL INCONTINENCE ALTERNATING WITH CONSTIPATION: ICD-10-CM

## 2021-08-05 DIAGNOSIS — K59.00 FECAL INCONTINENCE ALTERNATING WITH CONSTIPATION: ICD-10-CM

## 2021-08-18 DIAGNOSIS — R15.9 FECAL INCONTINENCE ALTERNATING WITH CONSTIPATION: ICD-10-CM

## 2021-08-18 DIAGNOSIS — K59.00 FECAL INCONTINENCE ALTERNATING WITH CONSTIPATION: ICD-10-CM

## 2021-08-20 DIAGNOSIS — R11.0 NAUSEA: Primary | ICD-10-CM

## 2021-08-20 DIAGNOSIS — E55.9 VITAMIN D DEFICIENCY: ICD-10-CM

## 2021-08-20 RX ORDER — FOLIC ACID 1 MG/1
TABLET ORAL
Qty: 30 TABLET | Refills: 0 | Status: SHIPPED | OUTPATIENT
Start: 2021-08-20 | End: 2021-09-02

## 2021-08-20 RX ORDER — ONDANSETRON 8 MG/1
TABLET, ORALLY DISINTEGRATING ORAL
Qty: 30 TABLET | Refills: 0 | Status: SHIPPED | OUTPATIENT
Start: 2021-08-20 | End: 2021-09-02

## 2021-09-02 ENCOUNTER — OFFICE VISIT (OUTPATIENT)
Dept: FAMILY MEDICINE CLINIC | Facility: CLINIC | Age: 26
End: 2021-09-02

## 2021-09-02 VITALS
HEIGHT: 75 IN | HEART RATE: 91 BPM | WEIGHT: 143.2 LBS | BODY MASS INDEX: 17.8 KG/M2 | OXYGEN SATURATION: 98 % | DIASTOLIC BLOOD PRESSURE: 69 MMHG | SYSTOLIC BLOOD PRESSURE: 101 MMHG

## 2021-09-02 DIAGNOSIS — Z11.59 ENCOUNTER FOR HEPATITIS C SCREENING TEST FOR LOW RISK PATIENT: ICD-10-CM

## 2021-09-02 DIAGNOSIS — E55.9 VITAMIN D DEFICIENCY: Chronic | ICD-10-CM

## 2021-09-02 DIAGNOSIS — K59.00 FECAL INCONTINENCE ALTERNATING WITH CONSTIPATION: Chronic | ICD-10-CM

## 2021-09-02 DIAGNOSIS — R11.0 NAUSEA: ICD-10-CM

## 2021-09-02 DIAGNOSIS — E53.8 FOLIC ACID DEFICIENCY: Chronic | ICD-10-CM

## 2021-09-02 DIAGNOSIS — R15.9 FECAL INCONTINENCE ALTERNATING WITH CONSTIPATION: Chronic | ICD-10-CM

## 2021-09-02 DIAGNOSIS — K31.84 GASTROPARESIS: Chronic | ICD-10-CM

## 2021-09-02 DIAGNOSIS — J30.9 ALLERGIC RHINITIS, UNSPECIFIED SEASONALITY, UNSPECIFIED TRIGGER: Primary | ICD-10-CM

## 2021-09-02 DIAGNOSIS — R53.83 OTHER FATIGUE: ICD-10-CM

## 2021-09-02 PROBLEM — K59.02 CONSTIPATION DUE TO OUTLET DYSFUNCTION: Status: ACTIVE | Noted: 2021-09-02

## 2021-09-02 PROCEDURE — 99214 OFFICE O/P EST MOD 30 MIN: CPT | Performed by: PHYSICIAN ASSISTANT

## 2021-09-02 NOTE — PROGRESS NOTES
Chief Complaint  Depression (2 month follow up)    Subjective     {Problem List  Visit Diagnosis   Encounters  Notes  Medications  Labs  Result Review Imaging  Media :23}     Giselle Hathaway presents to [facility]  History of Present Illness    Giselle Hathaway is a 26 y.o. male who presents today for a 2 month follow up Depression.     Pt is requesting a referral to an Allergy Dr.     Pt c/o nausea and vomiting since Monday. He has been taking Zofran but stated it hasn't helped. He also c/o dizziness, numb/cold feet.     Labs-7/2021    Pt has been having vomiting daily.  At night he has sweats.  He is having heat and cold intolerance.  He does have hx of anemia.      Pt did not go to the [facility] in [facility] for appt.      Current Outpatient Medications:   •  acetaminophen (Tylenol) 325 MG tablet, Tylenol 325 mg oral tablet take 1 tablet (325 mg) by oral route every 4 hours as needed   Suspended, Disp: , Rfl:   •  albuterol (ACCUNEB) 0.63 MG/3ML nebulizer solution, albuterol sulfate 0.63 mg/3 mL inhalation solution for nebulization use in nebulizer as directed   Suspended, Disp: , Rfl:   •  Alcaftadine (Lastacaft) 0.25 % solution, 1 drop., Disp: , Rfl:   •  Bepotastine Besilate (Bepreve) 1.5 % solution, 1 drop., Disp: , Rfl:   •  budesonide (PULMICORT) 0.25 MG/2ML nebulizer solution, Take 0.25 mg by nebulization Daily., Disp: , Rfl:   •  carbamide peroxide (DEBROX) 6.5 % otic solution, 5 drops., Disp: , Rfl:   •  cetirizine (zyrTEC) 10 MG tablet, Take 10 mg by mouth Daily., Disp: , Rfl:   •  EPINEPHrine (EpiPen 2-Mohinder) 0.3 MG/0.3ML solution auto-injector injection, 1 (One) Time., Disp: , Rfl:   •  Folic Acid 5 MG capsule, Take 5 mg by mouth Daily., Disp: 30 each, Rfl: 5  •  ketotifen (Alaway) 0.025 % ophthalmic solution, 1 drop., Disp: , Rfl:   •  linaclotide (LINZESS) 290 MCG capsule capsule, Take 1 capsule by mouth Every Morning Before Breakfast., Disp: 30  "capsule, Rfl: 11  •  loperamide (IMODIUM A-D) 2 MG tablet, loperamide 2 mg oral tablet q 4 hours as needed   Active, Disp: , Rfl:   •  mometasone (Nasonex) 50 MCG/ACT nasal spray, Nasonex 50 mcg/actuation nasal spray,non-aerosol spray 2 sprays (100 mcg) in each nostril by intranasal route once daily for 30 days   Suspended, Disp: , Rfl:   •  montelukast (SINGULAIR) 10 MG tablet, Take 1 tablet by mouth Every Night., Disp: 30 tablet, Rfl: 5  •  omeprazole (priLOSEC) 40 MG capsule, Take 1 capsule by mouth Daily., Disp: 30 capsule, Rfl: 5  •  ondansetron (ZOFRAN) 8 MG tablet, Take  by mouth Every 8 (Eight) Hours As Needed for Nausea or Vomiting., Disp: , Rfl:   •  sodium chloride 0.9 % nebulizer solution 0.82 mL with albuterol (5 MG/ML) 0.5% nebulizer solution 0.9 mg, Take 15 mg/hr by nebulization Daily As Needed (wheezing)., Disp: , Rfl:   •  sucralfate (Carafate) 1 g tablet, Carafate 1 gram oral tablet take 1 tablet (1 gram) by oral route 2 times per day on an empty stomach   Active, Disp: , Rfl:   •  vitamin B-12 (CYANOCOBALAMIN) 1000 MCG tablet, Take 1,000 mcg by mouth Daily., Disp: , Rfl:   •  vitamin B-6 (PYRIDOXINE) 50 MG tablet, Vitamin B-6 50 mg oral capsule take 1 capsule by oral route daily   Suspended, Disp: , Rfl:   •  vitamin D (ERGOCALCIFEROL) 1.25 MG (33061 UT) capsule capsule, Take 1 capsule by mouth 1 (One) Time Per Week., Disp: 5 capsule, Rfl: 5    Objective      Vital Signs:   /69 (BP Location: Left arm)   Pulse 91   Ht 190.5 cm (75\")   Wt 65 kg (143 lb 3.2 oz)   SpO2 98%   BMI 17.90 kg/m²    Estimated body mass index is 17.9 kg/m² as calculated from the following:    Height as of this encounter: 190.5 cm (75\").    Weight as of this encounter: 65 kg (143 lb 3.2 oz).     Physical Exam  Vitals and nursing note reviewed.   Constitutional:       Appearance: Normal appearance.      Comments: Thin   HENT:      Head: Normocephalic and atraumatic.   Cardiovascular:      Rate and Rhythm: Normal " rate and regular rhythm.   Pulmonary:      Effort: Pulmonary effort is normal.      Breath sounds: Normal breath sounds.   Musculoskeletal:      Cervical back: Neck supple.   Neurological:      Mental Status: He is alert.   Psychiatric:         Mood and Affect: Mood normal.         Behavior: Behavior normal.        Result Review :     Common labs    Common Labsle 7/1/21 7/1/21 7/1/21    1111 1111 1111   Glucose  85    BUN  5 (A)    Creatinine  0.99    eGFR African Am  111    Sodium  138    Potassium  3.9    Chloride  101    Calcium  9.2    Albumin  4.50    Total Bilirubin  0.7    Alkaline Phosphatase  81    AST (SGOT)  14    ALT (SGPT)  6    WBC 6.02     Hemoglobin 14.3     Hematocrit 42.5     Platelets 325     Total Cholesterol   170   Triglycerides   48   HDL Cholesterol   57   LDL Cholesterol    103 (A)   (A) Abnormal value                        Assessment and Plan      Diagnoses and all orders for this visit:    1. Allergic rhinitis, unspecified seasonality, unspecified trigger (Primary)  -     Ambulatory Referral to Allergy    2. Fecal incontinence alternating with constipation  Comments:  Poor control, insurance would not approve his linzess  Overview:  Controlled with linzess    Orders:  -     linaclotide (LINZESS) 290 MCG capsule capsule; Take 1 capsule by mouth Every Morning Before Breakfast.  Dispense: 30 capsule; Refill: 11    3. Gastroparesis  Comments:  Cont GI [place]    4. Vitamin D deficiency  Comments:  Fair control - taking Vit D supplement  Orders:  -     Vitamin D 25 hydroxy; Future    5. Folic acid deficiency  Comments:  Fair control - taking daily folic acid supplement  Orders:  -     Vitamin B12 & Folate; Future    6. Nausea    7. Encounter for hepatitis C screening test for low risk patient  -     Hepatitis C antibody; Future    Pt will cont GI.    Follow Up     Return in about 4 months (around 1/2/2022).    I have reviewed information obtained and documented by others and I have confirmed  the accuracy of this documented note.     Patient was given instructions and counseling regarding his condition or for health maintenance advice. Please see specific information pulled into the AVS if appropriate.     DENNYS Gibbs

## 2022-01-03 ENCOUNTER — OFFICE VISIT (OUTPATIENT)
Dept: FAMILY MEDICINE CLINIC | Facility: CLINIC | Age: 27
End: 2022-01-03

## 2022-01-03 ENCOUNTER — LAB (OUTPATIENT)
Dept: LAB | Facility: HOSPITAL | Age: 27
End: 2022-01-03

## 2022-01-03 VITALS
WEIGHT: 132 LBS | DIASTOLIC BLOOD PRESSURE: 76 MMHG | HEART RATE: 94 BPM | HEIGHT: 75 IN | SYSTOLIC BLOOD PRESSURE: 109 MMHG | OXYGEN SATURATION: 99 % | BODY MASS INDEX: 16.41 KG/M2

## 2022-01-03 DIAGNOSIS — J30.9 ALLERGIC RHINITIS, UNSPECIFIED SEASONALITY, UNSPECIFIED TRIGGER: ICD-10-CM

## 2022-01-03 DIAGNOSIS — R53.83 OTHER FATIGUE: ICD-10-CM

## 2022-01-03 DIAGNOSIS — R63.4 WEIGHT LOSS: ICD-10-CM

## 2022-01-03 DIAGNOSIS — K31.84 GASTROPARESIS: ICD-10-CM

## 2022-01-03 DIAGNOSIS — E53.8 FOLIC ACID DEFICIENCY: ICD-10-CM

## 2022-01-03 DIAGNOSIS — K59.00 FECAL INCONTINENCE ALTERNATING WITH CONSTIPATION: Chronic | ICD-10-CM

## 2022-01-03 DIAGNOSIS — Z11.59 ENCOUNTER FOR HEPATITIS C SCREENING TEST FOR LOW RISK PATIENT: ICD-10-CM

## 2022-01-03 DIAGNOSIS — E53.8 VITAMIN B12 DEFICIENCY: Primary | ICD-10-CM

## 2022-01-03 DIAGNOSIS — R15.9 FECAL INCONTINENCE ALTERNATING WITH CONSTIPATION: Chronic | ICD-10-CM

## 2022-01-03 DIAGNOSIS — E55.9 VITAMIN D DEFICIENCY: ICD-10-CM

## 2022-01-03 LAB
BASOPHILS # BLD AUTO: 0.02 10*3/MM3 (ref 0–0.2)
BASOPHILS NFR BLD AUTO: 0.4 % (ref 0–1.5)
DEPRECATED RDW RBC AUTO: 38.2 FL (ref 37–54)
EOSINOPHIL # BLD AUTO: 0.1 10*3/MM3 (ref 0–0.4)
EOSINOPHIL NFR BLD AUTO: 1.9 % (ref 0.3–6.2)
ERYTHROCYTE [DISTWIDTH] IN BLOOD BY AUTOMATED COUNT: 12.3 % (ref 12.3–15.4)
HCT VFR BLD AUTO: 40.2 % (ref 37.5–51)
HCV AB SER DONR QL: NORMAL
HGB BLD-MCNC: 13.6 G/DL (ref 13–17.7)
IMM GRANULOCYTES # BLD AUTO: 0.01 10*3/MM3 (ref 0–0.05)
IMM GRANULOCYTES NFR BLD AUTO: 0.2 % (ref 0–0.5)
LYMPHOCYTES # BLD AUTO: 1.93 10*3/MM3 (ref 0.7–3.1)
LYMPHOCYTES NFR BLD AUTO: 36.8 % (ref 19.6–45.3)
MCH RBC QN AUTO: 29.3 PG (ref 26.6–33)
MCHC RBC AUTO-ENTMCNC: 33.8 G/DL (ref 31.5–35.7)
MCV RBC AUTO: 86.6 FL (ref 79–97)
MONOCYTES # BLD AUTO: 0.31 10*3/MM3 (ref 0.1–0.9)
MONOCYTES NFR BLD AUTO: 5.9 % (ref 5–12)
NEUTROPHILS NFR BLD AUTO: 2.88 10*3/MM3 (ref 1.7–7)
NEUTROPHILS NFR BLD AUTO: 54.8 % (ref 42.7–76)
NRBC BLD AUTO-RTO: 0 /100 WBC (ref 0–0.2)
PLATELET # BLD AUTO: 245 10*3/MM3 (ref 140–450)
PMV BLD AUTO: 10.9 FL (ref 6–12)
RBC # BLD AUTO: 4.64 10*6/MM3 (ref 4.14–5.8)
WBC NRBC COR # BLD: 5.25 10*3/MM3 (ref 3.4–10.8)

## 2022-01-03 PROCEDURE — 86803 HEPATITIS C AB TEST: CPT

## 2022-01-03 PROCEDURE — 36415 COLL VENOUS BLD VENIPUNCTURE: CPT

## 2022-01-03 PROCEDURE — 85025 COMPLETE CBC W/AUTO DIFF WBC: CPT

## 2022-01-03 PROCEDURE — 99214 OFFICE O/P EST MOD 30 MIN: CPT | Performed by: PHYSICIAN ASSISTANT

## 2022-01-03 RX ORDER — LANOLIN ALCOHOL/MO/W.PET/CERES
1000 CREAM (GRAM) TOPICAL DAILY
Qty: 30 TABLET | Refills: 5 | Status: SHIPPED | OUTPATIENT
Start: 2022-01-03 | End: 2022-07-12 | Stop reason: SDUPTHER

## 2022-01-03 RX ORDER — ONDANSETRON 8 MG/1
8 TABLET, ORALLY DISINTEGRATING ORAL EVERY 8 HOURS PRN
COMMUNITY
End: 2022-01-03 | Stop reason: SDUPTHER

## 2022-01-03 RX ORDER — ONDANSETRON 8 MG/1
8 TABLET, ORALLY DISINTEGRATING ORAL EVERY 8 HOURS PRN
Qty: 30 TABLET | Refills: 2 | Status: SHIPPED | OUTPATIENT
Start: 2022-01-03 | End: 2022-07-12 | Stop reason: SDUPTHER

## 2022-01-03 RX ORDER — MONTELUKAST SODIUM 10 MG/1
10 TABLET ORAL NIGHTLY
Qty: 30 TABLET | Refills: 5 | Status: SHIPPED | OUTPATIENT
Start: 2022-01-03 | End: 2022-07-12 | Stop reason: SDUPTHER

## 2022-01-03 RX ORDER — OMEPRAZOLE 40 MG/1
40 CAPSULE, DELAYED RELEASE ORAL DAILY
Qty: 30 CAPSULE | Refills: 5 | Status: SHIPPED | OUTPATIENT
Start: 2022-01-03 | End: 2022-07-12 | Stop reason: SDUPTHER

## 2022-01-03 RX ORDER — SUCRALFATE 1 G/1
1 TABLET ORAL 4 TIMES DAILY
Qty: 120 TABLET | Refills: 5 | Status: SHIPPED | OUTPATIENT
Start: 2022-01-03 | End: 2022-07-12 | Stop reason: SDUPTHER

## 2022-01-03 RX ORDER — LANOLIN ALCOHOL/MO/W.PET/CERES
50 CREAM (GRAM) TOPICAL DAILY
Qty: 30 TABLET | Refills: 5 | Status: SHIPPED | OUTPATIENT
Start: 2022-01-03 | End: 2022-07-12 | Stop reason: SDUPTHER

## 2022-01-03 RX ORDER — MOMETASONE FUROATE 50 UG/1
2 SPRAY, METERED NASAL DAILY
Qty: 17 G | Refills: 5 | Status: SHIPPED | OUTPATIENT
Start: 2022-01-03 | End: 2022-07-12 | Stop reason: SDUPTHER

## 2022-01-03 RX ORDER — CETIRIZINE HYDROCHLORIDE 10 MG/1
10 TABLET ORAL DAILY
Qty: 30 TABLET | Refills: 5 | Status: SHIPPED | OUTPATIENT
Start: 2022-01-03 | End: 2022-07-12 | Stop reason: SDUPTHER

## 2022-01-03 RX ORDER — ERGOCALCIFEROL 1.25 MG/1
50000 CAPSULE ORAL WEEKLY
Qty: 13 CAPSULE | Refills: 1 | Status: SHIPPED | OUTPATIENT
Start: 2022-01-03 | End: 2022-04-03 | Stop reason: SDUPTHER

## 2022-01-03 RX ORDER — FOLIC ACID 5 MG
5 CAPSULE ORAL DAILY
Qty: 30 EACH | Refills: 5 | Status: SHIPPED | OUTPATIENT
Start: 2022-01-03 | End: 2022-04-03 | Stop reason: SDUPTHER

## 2022-01-03 NOTE — PROGRESS NOTES
Chief Complaint  Allergies (4 month follow up) and Depression    Subjective          Corentez Woodrow Hathaway presents to [facility]  History of Present Illness  Pt presents today for 4 month follow up.    Pt states he has been experiencing numbness in both hands and feet. Pt states after the numbness goes away he will feel a burning sensation.     Pt states he has been having a pain in his stomach everyday. Pt states the pain is worse when it is cold.    Pt states light will irritate his eyes and will water up.    Pt states it is hard to hear in rt ear. Pt states it started since he hit his ear last month.     Pt states he has been vomiting frequently lately.    Pt states he stays cold.    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 No pertinent surgical history.       Current Outpatient Medications:   •  acetaminophen (Tylenol) 325 MG tablet, Tylenol 325 mg oral tablet take 1 tablet (325 mg) by oral route every 4 hours as needed   Suspended, Disp: , Rfl:   •  albuterol (ACCUNEB) 0.63 MG/3ML nebulizer solution, albuterol sulfate 0.63 mg/3 mL inhalation solution for  nebulization use in nebulizer as directed   Suspended, Disp: , Rfl:   •  Alcaftadine (Lastacaft) 0.25 % solution, 1 drop., Disp: , Rfl:   •  Bepotastine Besilate (Bepreve) 1.5 % solution, 1 drop., Disp: , Rfl:   •  budesonide (PULMICORT) 0.25 MG/2ML nebulizer solution, Take 0.25 mg by nebulization Daily., Disp: , Rfl:   •  carbamide peroxide (DEBROX) 6.5 % otic solution, 5 drops., Disp: , Rfl:   •  cetirizine (zyrTEC) 10 MG tablet, Take 1 tablet by mouth Daily., Disp: 30 tablet, Rfl: 5  •  EPINEPHrine (EpiPen 2-Mohinder) 0.3 MG/0.3ML solution auto-injector injection, 1 (One) Time., Disp: , Rfl:   •  Folic Acid 5 MG capsule, Take 5 mg by mouth Daily., Disp: 30 each, Rfl: 5  •  ketotifen (Alaway) 0.025 % ophthalmic solution, 1 drop., Disp: , Rfl:   •  linaclotide (LINZESS) 290 MCG capsule capsule, Take 1 capsule by mouth Every Morning Before Breakfast., Disp: 30 capsule, Rfl: 11  •  loperamide (IMODIUM A-D) 2 MG tablet, loperamide 2 mg oral tablet q 4 hours as needed   Active, Disp: , Rfl:   •  mometasone (Nasonex) 50 MCG/ACT nasal spray, 2 sprays into the nostril(s) as directed by provider Daily., Disp: 17 g, Rfl: 5  •  montelukast (SINGULAIR) 10 MG tablet, Take 1 tablet by mouth Every Night., Disp: 30 tablet, Rfl: 5  •  omeprazole (priLOSEC) 40 MG capsule, Take 1 capsule by mouth Daily., Disp: 30 capsule, Rfl: 5  •  ondansetron ODT (ZOFRAN-ODT) 8 MG disintegrating tablet, Place 1 tablet on the tongue Every 8 (Eight) Hours As Needed for Nausea or Vomiting., Disp: 30 tablet, Rfl: 2  •  sodium chloride 0.9 % nebulizer solution 0.82 mL with albuterol (5 MG/ML) 0.5% nebulizer solution 0.9 mg, Take 15 mg/hr by nebulization Daily As Needed (wheezing)., Disp: , Rfl:   •  sucralfate (Carafate) 1 g tablet, Take 1 tablet by mouth 4 (Four) Times a Day., Disp: 120 tablet, Rfl: 5  •  vitamin B-12 (CYANOCOBALAMIN) 1000 MCG tablet, Take 1 tablet by mouth Daily., Disp: 30 tablet, Rfl: 5  •  vitamin B-6 (PYRIDOXINE) 50 MG tablet, Take 1  "tablet by mouth Daily., Disp: 30 tablet, Rfl: 5  •  vitamin D (ERGOCALCIFEROL) 1.25 MG (91772 UT) capsule capsule, Take 1 capsule by mouth 1 (One) Time Per Week., Disp: 13 capsule, Rfl: 1    Objective   Vital Signs:     /76 (BP Location: Right arm)   Pulse 94   Ht 190.5 cm (75\")   Wt 59.9 kg (132 lb)   SpO2 99%   BMI 16.50 kg/m²    Estimated body mass index is 16.5 kg/m² as calculated from the following:    Height as of this encounter: 190.5 cm (75\").    Weight as of this encounter: 59.9 kg (132 lb).     Physical Exam  Vitals and nursing note reviewed.   Constitutional:       Appearance: Normal appearance.   HENT:      Head: Normocephalic and atraumatic.   Cardiovascular:      Rate and Rhythm: Normal rate and regular rhythm.   Pulmonary:      Effort: Pulmonary effort is normal.      Breath sounds: Normal breath sounds.   Musculoskeletal:      Cervical back: Neck supple.   Neurological:      Mental Status: He is alert.   Psychiatric:         Mood and Affect: Mood normal.         Behavior: Behavior normal.        Result Review :     Common labs    Common Labsle 7/1/21 7/1/21 7/1/21    1111 1111 1111   Glucose  85    BUN  5 (A)    Creatinine  0.99    eGFR African Am  111    Sodium  138    Potassium  3.9    Chloride  101    Calcium  9.2    Albumin  4.50    Total Bilirubin  0.7    Alkaline Phosphatase  81    AST (SGOT)  14    ALT (SGPT)  6    WBC 6.02     Hemoglobin 14.3     Hematocrit 42.5     Platelets 325     Total Cholesterol   170   Triglycerides   48   HDL Cholesterol   57   LDL Cholesterol    103 (A)   (A) Abnormal value                          Assessment and Plan      Diagnoses and all orders for this visit:    1. Vitamin B12 deficiency (Primary)  -     vitamin B-12 (CYANOCOBALAMIN) 1000 MCG tablet; Take 1 tablet by mouth Daily.  Dispense: 30 tablet; Refill: 5  -     vitamin B-6 (PYRIDOXINE) 50 MG tablet; Take 1 tablet by mouth Daily.  Dispense: 30 tablet; Refill: 5  -     Ambulatory Referral to " Gastroenterology    2. Folic acid deficiency  -     Folic Acid 5 MG capsule; Take 5 mg by mouth Daily.  Dispense: 30 each; Refill: 5  -     Ambulatory Referral to Gastroenterology    3. Fecal incontinence alternating with constipation  Comments:  Poor control, insurance would not approve his linzess  Overview:  Controlled with linzess    Orders:  -     linaclotide (LINZESS) 290 MCG capsule capsule; Take 1 capsule by mouth Every Morning Before Breakfast.  Dispense: 30 capsule; Refill: 11  -     Ambulatory Referral to Gastroenterology    4. Allergic rhinitis, unspecified seasonality, unspecified trigger  -     mometasone (Nasonex) 50 MCG/ACT nasal spray; 2 sprays into the nostril(s) as directed by provider Daily.  Dispense: 17 g; Refill: 5  -     montelukast (SINGULAIR) 10 MG tablet; Take 1 tablet by mouth Every Night.  Dispense: 30 tablet; Refill: 5  -     cetirizine (zyrTEC) 10 MG tablet; Take 1 tablet by mouth Daily.  Dispense: 30 tablet; Refill: 5  -     Cancel: Ambulatory Referral to Allergy  -     Ambulatory Referral to Allergy    5. Gastroparesis  -     omeprazole (priLOSEC) 40 MG capsule; Take 1 capsule by mouth Daily.  Dispense: 30 capsule; Refill: 5  -     ondansetron ODT (ZOFRAN-ODT) 8 MG disintegrating tablet; Place 1 tablet on the tongue Every 8 (Eight) Hours As Needed for Nausea or Vomiting.  Dispense: 30 tablet; Refill: 2  -     sucralfate (Carafate) 1 g tablet; Take 1 tablet by mouth 4 (Four) Times a Day.  Dispense: 120 tablet; Refill: 5  -     Ambulatory Referral to Gastroenterology    6. Vitamin D deficiency  -     vitamin D (ERGOCALCIFEROL) 1.25 MG (41662 UT) capsule capsule; Take 1 capsule by mouth 1 (One) Time Per Week.  Dispense: 13 capsule; Refill: 1  -     Ambulatory Referral to Gastroenterology    7. Weight loss  -     Ambulatory Referral to Gastroenterology     Follow Up     Return in about 4 months (around 5/3/2022).    Patient was given instructions and counseling regarding his condition or  for health maintenance advice. Please see specific information pulled into the AVS if appropriate.     I have reviewed information obtained and documented by others and I have confirmed the accuracy of this documented note.    DENNYS Gibbs

## 2022-01-04 ENCOUNTER — TELEPHONE (OUTPATIENT)
Dept: FAMILY MEDICINE CLINIC | Facility: CLINIC | Age: 27
End: 2022-01-04

## 2022-03-30 ENCOUNTER — TELEPHONE (OUTPATIENT)
Dept: FAMILY MEDICINE CLINIC | Facility: CLINIC | Age: 27
End: 2022-03-30

## 2022-04-01 ENCOUNTER — LAB (OUTPATIENT)
Dept: LAB | Facility: HOSPITAL | Age: 27
End: 2022-04-01

## 2022-04-01 DIAGNOSIS — R53.83 OTHER FATIGUE: ICD-10-CM

## 2022-04-01 DIAGNOSIS — E53.8 FOLIC ACID DEFICIENCY: Chronic | ICD-10-CM

## 2022-04-01 DIAGNOSIS — E55.9 VITAMIN D DEFICIENCY: Chronic | ICD-10-CM

## 2022-04-01 LAB
25(OH)D3 SERPL-MCNC: 5.2 NG/ML (ref 30–100)
FOLATE SERPL-MCNC: <2 NG/ML (ref 4.78–24.2)
TSH SERPL DL<=0.05 MIU/L-ACNC: 2.38 UIU/ML (ref 0.27–4.2)
VIT B12 BLD-MCNC: 543 PG/ML (ref 211–946)

## 2022-04-01 PROCEDURE — 84443 ASSAY THYROID STIM HORMONE: CPT

## 2022-04-01 PROCEDURE — 36415 COLL VENOUS BLD VENIPUNCTURE: CPT

## 2022-04-01 PROCEDURE — 82306 VITAMIN D 25 HYDROXY: CPT

## 2022-04-01 PROCEDURE — 82746 ASSAY OF FOLIC ACID SERUM: CPT

## 2022-04-01 PROCEDURE — 82607 VITAMIN B-12: CPT

## 2022-04-03 DIAGNOSIS — E55.9 VITAMIN D DEFICIENCY: ICD-10-CM

## 2022-04-03 DIAGNOSIS — E53.8 FOLIC ACID DEFICIENCY: ICD-10-CM

## 2022-04-03 RX ORDER — ERGOCALCIFEROL 1.25 MG/1
50000 CAPSULE ORAL WEEKLY
Qty: 13 CAPSULE | Refills: 1 | Status: SHIPPED | OUTPATIENT
Start: 2022-04-03 | End: 2022-05-11

## 2022-04-03 RX ORDER — FOLIC ACID 5 MG
5 CAPSULE ORAL DAILY
Qty: 30 EACH | Refills: 5 | Status: SHIPPED | OUTPATIENT
Start: 2022-04-03 | End: 2022-07-12 | Stop reason: SDUPTHER

## 2022-04-04 ENCOUNTER — TELEPHONE (OUTPATIENT)
Dept: FAMILY MEDICINE CLINIC | Facility: CLINIC | Age: 27
End: 2022-04-04

## 2022-04-04 NOTE — TELEPHONE ENCOUNTER
----- Message from DENNYS Gibbs sent at 4/3/2022  6:36 PM EDT -----  Vitamin D Deficiency noted - begin Vitamin D 50,000 IU weekly #[id number]    Folic Acid 1mg daily #90x1RF

## 2022-04-05 ENCOUNTER — OFFICE VISIT (OUTPATIENT)
Dept: FAMILY MEDICINE CLINIC | Facility: CLINIC | Age: 27
End: 2022-04-05

## 2022-04-05 VITALS
SYSTOLIC BLOOD PRESSURE: 109 MMHG | HEART RATE: 81 BPM | WEIGHT: 125 LBS | DIASTOLIC BLOOD PRESSURE: 80 MMHG | OXYGEN SATURATION: 96 % | HEIGHT: 75 IN | BODY MASS INDEX: 15.54 KG/M2

## 2022-04-05 DIAGNOSIS — R11.2 NAUSEA AND VOMITING, UNSPECIFIED VOMITING TYPE: ICD-10-CM

## 2022-04-05 DIAGNOSIS — R63.4 WEIGHT LOSS, ABNORMAL: ICD-10-CM

## 2022-04-05 DIAGNOSIS — E53.8 FOLIC ACID DEFICIENCY: Primary | ICD-10-CM

## 2022-04-05 DIAGNOSIS — E55.9 VITAMIN D DEFICIENCY: ICD-10-CM

## 2022-04-05 PROCEDURE — 99214 OFFICE O/P EST MOD 30 MIN: CPT | Performed by: PHYSICIAN ASSISTANT

## 2022-04-05 RX ORDER — CYANOCOBALAMIN (VITAMIN B-12) 1000 MCG
1 TABLET, EXTENDED RELEASE ORAL DAILY
COMMUNITY
Start: 2022-01-03

## 2022-04-05 NOTE — PROGRESS NOTES
"Chief Complaint  Vitamin D Deficiency (4 month follow up) and Depression    Subjective          Corentez Woodrow Hathaway presents to [facility]  History of Present Illness  Pt presents today for 4 month follow up.    Pt states since going to the dentist for the last 3 weeks he has started vomiting several times a night. Pt states it is due to the pain.  Pt has been going to dentist weekly.  He states that he tastes the \"nasty stuff\" they are putting on his teeth. He has numerous cavities.  They are working on his teeth over time. Dr. Bautista    Pt has GI, Hematology  Dr. Miles - GI  Dietician at [facility]  Neuro - Orophillia    Pt states would like to discuss recent labs.    Pt mother states pt hands have been shaking more than usual.  Would like to discuss dosage of B-6.    Pt mother has brought in disability forms to have filled out.    Labs 4/1/22    Pt is not taking meds on regular basis.  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 No pertinent surgical history.       Current Outpatient Medications:   •  acetaminophen (TYLENOL) 325 MG tablet, Tylenol 325 mg oral tablet take 1 tablet (325 mg) by oral route every 4 hours as needed   Suspended, Disp: , Rfl:   •  albuterol (ACCUNEB) 0.63 MG/3ML " nebulizer solution, albuterol sulfate 0.63 mg/3 mL inhalation solution for nebulization use in nebulizer as directed   Suspended, Disp: , Rfl:   •  Alcaftadine (Lastacaft) 0.25 % solution, 1 drop., Disp: , Rfl:   •  Bepotastine Besilate 1.5 % solution, 1 drop., Disp: , Rfl:   •  budesonide (PULMICORT) 0.25 MG/2ML nebulizer solution, Take 0.25 mg by nebulization Daily., Disp: , Rfl:   •  carbamide peroxide (DEBROX) 6.5 % otic solution, 5 drops., Disp: , Rfl:   •  cetirizine (zyrTEC) 10 MG tablet, Take 1 tablet by mouth Daily., Disp: 30 tablet, Rfl: 5  •  Cyanocobalamin (Vitamin B-12 ER) 1000 MCG tablet controlled-release, Take 1 tablet by mouth Daily., Disp: , Rfl:   •  EPINEPHrine (EPIPEN) 0.3 MG/0.3ML solution auto-injector injection, 1 (One) Time., Disp: , Rfl:   •  Folic Acid 5 MG capsule, Take 5 mg by mouth Daily., Disp: 30 each, Rfl: 5  •  ketotifen (ZADITOR) 0.025 % ophthalmic solution, 1 drop., Disp: , Rfl:   •  linaclotide (LINZESS) 290 MCG capsule capsule, Take 1 capsule by mouth Every Morning Before Breakfast., Disp: 30 capsule, Rfl: 11  •  loperamide (IMODIUM A-D) 2 MG tablet, loperamide 2 mg oral tablet q 4 hours as needed   Active, Disp: , Rfl:   •  mometasone (Nasonex) 50 MCG/ACT nasal spray, 2 sprays into the nostril(s) as directed by provider Daily., Disp: 17 g, Rfl: 5  •  montelukast (SINGULAIR) 10 MG tablet, Take 1 tablet by mouth Every Night., Disp: 30 tablet, Rfl: 5  •  omeprazole (priLOSEC) 40 MG capsule, Take 1 capsule by mouth Daily., Disp: 30 capsule, Rfl: 5  •  ondansetron ODT (ZOFRAN-ODT) 8 MG disintegrating tablet, Place 1 tablet on the tongue Every 8 (Eight) Hours As Needed for Nausea or Vomiting., Disp: 30 tablet, Rfl: 2  •  sodium chloride 0.9 % nebulizer solution 0.82 mL with albuterol (5 MG/ML) 0.5% nebulizer solution 0.9 mg, Take 15 mg/hr by nebulization Daily As Needed (wheezing)., Disp: , Rfl:   •  sucralfate (Carafate) 1 g tablet, Take 1 tablet by mouth 4 (Four) Times a Day.,  "Disp: 120 tablet, Rfl: 5  •  vitamin B-12 (CYANOCOBALAMIN) 1000 MCG tablet, Take 1 tablet by mouth Daily., Disp: 30 tablet, Rfl: 5  •  vitamin B-6 (PYRIDOXINE) 50 MG tablet, Take 1 tablet by mouth Daily., Disp: 30 tablet, Rfl: 5  •  vitamin D (ERGOCALCIFEROL) 1.25 MG (97431 UT) capsule capsule, Take 1 capsule by mouth 1 (One) Time Per Week., Disp: 13 capsule, Rfl: 1    Objective     Vital Signs:     /80 (BP Location: Right arm)   Pulse 81   Ht 190.5 cm (75\")   Wt 56.7 kg (125 lb)   SpO2 96%   BMI 15.62 kg/m²    Estimated body mass index is 15.62 kg/m² as calculated from the following:    Height as of this encounter: 190.5 cm (75\").    Weight as of this encounter: 56.7 kg (125 lb).     Wt Readings from Last 3 Encounters:   04/05/22 56.7 kg (125 lb)   01/03/22 59.9 kg (132 lb)   09/02/21 65 kg (143 lb 3.2 oz)     BP Readings from Last 3 Encounters:   04/05/22 109/80   01/03/22 109/76   09/02/21 101/69     BMI is below normal parameters. Recommendations: referral to dietitian    Physical Exam  Vitals and nursing note reviewed.   Constitutional:       Appearance: Normal appearance.      Comments: Thin   HENT:      Head: Normocephalic and atraumatic.   Cardiovascular:      Rate and Rhythm: Normal rate and regular rhythm.      Heart sounds: Normal heart sounds.   Pulmonary:      Effort: Pulmonary effort is normal.      Breath sounds: Normal breath sounds.   Musculoskeletal:      Cervical back: Neck supple.   Neurological:      Mental Status: He is alert.   Psychiatric:         Mood and Affect: Mood normal.         Behavior: Behavior normal.        Pt has been referred to [facility] in [facility] on numerous occ.  They have no showed the appt.    Result Review :{Labs  Result Review  Imaging  Med Tab  Media :23}     Common labs    Common Labsle 7/1/21 7/1/21 7/1/21 1/3/22    1111 1111 1111    Glucose  85     BUN  5 (A)     Creatinine  0.99     eGFR African Am  111     Sodium  138     Potassium  3.9   "   Chloride  101     Calcium  9.2     Albumin  4.50     Total Bilirubin  0.7     Alkaline Phosphatase  81     AST (SGOT)  14     ALT (SGPT)  6     WBC 6.02   5.25   Hemoglobin 14.3   13.6   Hematocrit 42.5   40.2   Platelets 325   245   Total Cholesterol   170    Triglycerides   48    HDL Cholesterol   57    LDL Cholesterol    103 (A)    (A) Abnormal value                          Assessment and Plan      Diagnoses and all orders for this visit:    1. Folic acid deficiency (Primary)  Overview:  Poor control - begin folic acid 5mg daily      2. Vitamin D deficiency  Overview:  Poor control take Vit D 5000 IU daily and Vit D 50,000 IU weekly      3. Nausea and vomiting, unspecified vomiting type  Comments:  Zofran PRN    4. Weight loss, abnormal  Comments:  Cont dietician, hematology       Follow Up     Return in about 3 months (around 7/5/2022).    Patient was given instructions and counseling regarding his condition or for health maintenance advice. Please see specific information pulled into the AVS if appropriate.     I have reviewed information obtained and documented by others and I have confirmed the accuracy of this documented note.    DENNYS Gibbs

## 2022-05-10 DIAGNOSIS — E55.9 VITAMIN D DEFICIENCY: ICD-10-CM

## 2022-05-11 RX ORDER — ERGOCALCIFEROL 1.25 MG/1
CAPSULE ORAL
Qty: 13 CAPSULE | Refills: 1 | Status: SHIPPED | OUTPATIENT
Start: 2022-05-11 | End: 2022-10-11 | Stop reason: SDUPTHER

## 2022-07-12 ENCOUNTER — OFFICE VISIT (OUTPATIENT)
Dept: FAMILY MEDICINE CLINIC | Facility: CLINIC | Age: 27
End: 2022-07-12

## 2022-07-12 VITALS
SYSTOLIC BLOOD PRESSURE: 105 MMHG | OXYGEN SATURATION: 100 % | DIASTOLIC BLOOD PRESSURE: 68 MMHG | WEIGHT: 121 LBS | HEIGHT: 75 IN | BODY MASS INDEX: 15.04 KG/M2 | HEART RATE: 72 BPM

## 2022-07-12 DIAGNOSIS — K59.00 FECAL INCONTINENCE ALTERNATING WITH CONSTIPATION: Chronic | ICD-10-CM

## 2022-07-12 DIAGNOSIS — F41.9 ANXIETY: ICD-10-CM

## 2022-07-12 DIAGNOSIS — E53.8 VITAMIN B12 DEFICIENCY: ICD-10-CM

## 2022-07-12 DIAGNOSIS — E53.8 FOLIC ACID DEFICIENCY: ICD-10-CM

## 2022-07-12 DIAGNOSIS — F32.A DEPRESSION, UNSPECIFIED DEPRESSION TYPE: ICD-10-CM

## 2022-07-12 DIAGNOSIS — J30.9 ALLERGIC RHINITIS, UNSPECIFIED SEASONALITY, UNSPECIFIED TRIGGER: ICD-10-CM

## 2022-07-12 DIAGNOSIS — K31.84 GASTROPARESIS: Primary | ICD-10-CM

## 2022-07-12 DIAGNOSIS — R15.9 FECAL INCONTINENCE ALTERNATING WITH CONSTIPATION: Chronic | ICD-10-CM

## 2022-07-12 DIAGNOSIS — R63.4 ABNORMAL WEIGHT LOSS: ICD-10-CM

## 2022-07-12 PROBLEM — S09.90XA HEAD INJURY: Status: RESOLVED | Noted: 2021-06-15 | Resolved: 2022-07-12

## 2022-07-12 PROBLEM — S01.81XA FACIAL LACERATION: Status: RESOLVED | Noted: 2021-06-15 | Resolved: 2022-07-12

## 2022-07-12 PROCEDURE — 99214 OFFICE O/P EST MOD 30 MIN: CPT | Performed by: PHYSICIAN ASSISTANT

## 2022-07-12 RX ORDER — CETIRIZINE HYDROCHLORIDE 10 MG/1
10 TABLET ORAL DAILY
Qty: 30 TABLET | Refills: 5 | Status: SHIPPED | OUTPATIENT
Start: 2022-07-12

## 2022-07-12 RX ORDER — LANOLIN ALCOHOL/MO/W.PET/CERES
1000 CREAM (GRAM) TOPICAL DAILY
Qty: 30 TABLET | Refills: 5 | Status: SHIPPED | OUTPATIENT
Start: 2022-07-12 | End: 2022-10-11 | Stop reason: SDUPTHER

## 2022-07-12 RX ORDER — ONDANSETRON 8 MG/1
8 TABLET, ORALLY DISINTEGRATING ORAL EVERY 8 HOURS PRN
Qty: 30 TABLET | Refills: 2 | Status: SHIPPED | OUTPATIENT
Start: 2022-07-12 | End: 2022-10-11 | Stop reason: SDUPTHER

## 2022-07-12 RX ORDER — MONTELUKAST SODIUM 10 MG/1
10 TABLET ORAL NIGHTLY
Qty: 30 TABLET | Refills: 5 | Status: SHIPPED | OUTPATIENT
Start: 2022-07-12 | End: 2022-10-11 | Stop reason: SDUPTHER

## 2022-07-12 RX ORDER — MOMETASONE FUROATE 50 UG/1
2 SPRAY, METERED NASAL DAILY
Qty: 17 G | Refills: 5 | Status: SHIPPED | OUTPATIENT
Start: 2022-07-12

## 2022-07-12 RX ORDER — SUCRALFATE 1 G/1
1 TABLET ORAL 4 TIMES DAILY
Qty: 120 TABLET | Refills: 5 | Status: SHIPPED | OUTPATIENT
Start: 2022-07-12 | End: 2022-10-11 | Stop reason: SDUPTHER

## 2022-07-12 RX ORDER — LANOLIN ALCOHOL/MO/W.PET/CERES
50 CREAM (GRAM) TOPICAL DAILY
Qty: 30 TABLET | Refills: 5 | Status: SHIPPED | OUTPATIENT
Start: 2022-07-12

## 2022-07-12 RX ORDER — OMEPRAZOLE 40 MG/1
40 CAPSULE, DELAYED RELEASE ORAL DAILY
Qty: 30 CAPSULE | Refills: 5 | Status: SHIPPED | OUTPATIENT
Start: 2022-07-12 | End: 2022-10-11 | Stop reason: SDUPTHER

## 2022-07-12 RX ORDER — FOLIC ACID 5 MG
5 CAPSULE ORAL DAILY
Qty: 30 EACH | Refills: 5 | Status: SHIPPED | OUTPATIENT
Start: 2022-07-12 | End: 2022-10-11 | Stop reason: SDUPTHER

## 2022-07-12 NOTE — PROGRESS NOTES
Chief Complaint  Vitamin D Deficiency (3 month follow up), Allergies, Depression, and Asthma    Subjective          Corentez Woodrow Hathaway presents to [facility]  History of Present Illness     Pt presents today for 3 month follow up.    Pt states for the month he has been having a burning sensation starting in his stomach and will radiate through his body.  Pt states when he eats he feels his stomach can't take it and the pain will cause vomiting. Pt states he hs the pain everyday.  Pt states his bm are normal and regular. Pt states he is losing weight due to the pain and vomiting. Pt weighted 130 at last appt and is 121 today.    Pt states he can eat waffles and will have no issues. Pt has been drinking a new tea that will help with the pain.    Labs 4/1/22  TSH 2.3    Pt was dx with gastroparesis by FREDDIE Acuña in [facility].  They have not followed up with specialist since then.    Pt had 15 dental carries recently repaired by his dentist.    Past Medical History:   Diagnosis Date   • Allergic rhinitis    • Anxiety    • Asthma 07/28/2015   • Chronic bronchitis (HCC)    • Depression    • Esophagitis, eosinophilic 08/18/2016   • Fecal incontinence alternating with constipation 08/18/2016   • Gastroparesis 07/28/2015   • Jaundice    • 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 08/18/2016   • Mental impairment 08/18/2016   • Panic attacks    • Pelvic floor dysfunction 07/28/2015   • Special educational needs 03/14/2017   • Speech impediment 08/18/2016   • Vitamin B12 deficiency 07/18/2016   • Vitamin D deficiency 08/18/2016   • Weight loss       Family History   Family history unknown: Yes      History reviewed. No pertinent surgical history.       Current Outpatient Medications:   •  acetaminophen (TYLENOL) 325 MG tablet, Tylenol 325 mg oral tablet take 1 tablet (325 mg) by oral route every 4 hours as needed   Suspended, Disp: , Rfl:   •  albuterol (ACCUNEB) 0.63 MG/3ML  nebulizer solution, albuterol sulfate 0.63 mg/3 mL inhalation solution for nebulization use in nebulizer as directed   Suspended, Disp: , Rfl:   •  Alcaftadine (Lastacaft) 0.25 % solution, 1 drop., Disp: , Rfl:   •  Bepotastine Besilate 1.5 % solution, 1 drop., Disp: , Rfl:   •  budesonide (PULMICORT) 0.25 MG/2ML nebulizer solution, Take 0.25 mg by nebulization Daily., Disp: , Rfl:   •  carbamide peroxide (DEBROX) 6.5 % otic solution, 5 drops., Disp: , Rfl:   •  cetirizine (zyrTEC) 10 MG tablet, Take 1 tablet by mouth Daily., Disp: 30 tablet, Rfl: 5  •  Cyanocobalamin (Vitamin B-12 ER) 1000 MCG tablet controlled-release, Take 1 tablet by mouth Daily., Disp: , Rfl:   •  EPINEPHrine (EPIPEN) 0.3 MG/0.3ML solution auto-injector injection, 1 (One) Time., Disp: , Rfl:   •  Folic Acid 5 MG capsule, Take 5 mg by mouth Daily., Disp: 30 each, Rfl: 5  •  ketotifen (ZADITOR) 0.025 % ophthalmic solution, 1 drop., Disp: , Rfl:   •  linaclotide (LINZESS) 290 MCG capsule capsule, Take 1 capsule by mouth Every Morning Before Breakfast., Disp: 30 capsule, Rfl: 11  •  loperamide (IMODIUM A-D) 2 MG tablet, loperamide 2 mg oral tablet q 4 hours as needed   Active, Disp: , Rfl:   •  mometasone (Nasonex) 50 MCG/ACT nasal spray, 2 sprays into the nostril(s) as directed by provider Daily., Disp: 17 g, Rfl: 5  •  montelukast (SINGULAIR) 10 MG tablet, Take 1 tablet by mouth Every Night., Disp: 30 tablet, Rfl: 5  •  omeprazole (priLOSEC) 40 MG capsule, Take 1 capsule by mouth Daily., Disp: 30 capsule, Rfl: 5  •  ondansetron ODT (ZOFRAN-ODT) 8 MG disintegrating tablet, Place 1 tablet on the tongue Every 8 (Eight) Hours As Needed for Nausea or Vomiting., Disp: 30 tablet, Rfl: 2  •  sodium chloride 0.9 % nebulizer solution 0.82 mL with albuterol (5 MG/ML) 0.5% nebulizer solution 0.9 mg, Take 15 mg/hr by nebulization Daily As Needed (wheezing)., Disp: , Rfl:   •  sucralfate (Carafate) 1 g tablet, Take 1 tablet by mouth 4 (Four) Times a Day.,  "Disp: 120 tablet, Rfl: 5  •  vitamin B-12 (CYANOCOBALAMIN) 1000 MCG tablet, Take 1 tablet by mouth Daily., Disp: 30 tablet, Rfl: 5  •  vitamin B-6 (PYRIDOXINE) 50 MG tablet, Take 1 tablet by mouth Daily., Disp: 30 tablet, Rfl: 5  •  vitamin D (ERGOCALCIFEROL) 1.25 MG (95891 UT) capsule capsule, Take 1 capsule by mouth once a week, Disp: 13 capsule, Rfl: 1    Objective     Vital Signs:     /68 (BP Location: Right arm)   Pulse 72   Ht 190.5 cm (75\")   Wt 54.9 kg (121 lb)   SpO2 100%   BMI 15.12 kg/m²    Estimated body mass index is 15.12 kg/m² as calculated from the following:    Height as of this encounter: 190.5 cm (75\").    Weight as of this encounter: 54.9 kg (121 lb).     Wt Readings from Last 3 Encounters:   07/12/22 54.9 kg (121 lb)   06/12/22 59 kg (130 lb)   04/05/22 56.7 kg (125 lb)     BP Readings from Last 3 Encounters:   07/12/22 105/68   06/12/22 129/80   04/05/22 109/80     Physical Exam  Vitals and nursing note reviewed.   Constitutional:       Appearance: Normal appearance.      Comments: Very thin   HENT:      Head: Normocephalic and atraumatic.   Cardiovascular:      Rate and Rhythm: Normal rate and regular rhythm.      Heart sounds: Normal heart sounds.   Pulmonary:      Effort: Pulmonary effort is normal.      Breath sounds: Normal breath sounds.   Abdominal:      General: Abdomen is flat.      Palpations: Abdomen is soft.   Musculoskeletal:      Cervical back: Neck supple.   Neurological:      Mental Status: He is alert.   Psychiatric:         Mood and Affect: Mood normal.         Behavior: Behavior normal.        Result Review :     Common labs    Common Labsle 1/3/22   WBC 5.25   Hemoglobin 13.6   Hematocrit 40.2   Platelets 245                    Patient Care Team:  Luis Soler PA as PCP - General (Physician Assistant)         Assessment and Plan      Diagnoses and all orders for this visit:    1. Gastroparesis (Primary)  -     omeprazole (priLOSEC) 40 MG capsule; Take 1 capsule by " mouth Daily.  Dispense: 30 capsule; Refill: 5  -     ondansetron ODT (ZOFRAN-ODT) 8 MG disintegrating tablet; Place 1 tablet on the tongue Every 8 (Eight) Hours As Needed for Nausea or Vomiting.  Dispense: 30 tablet; Refill: 2  -     sucralfate (Carafate) 1 g tablet; Take 1 tablet by mouth 4 (Four) Times a Day.  Dispense: 120 tablet; Refill: 5  -     Ambulatory Referral to Gastroenterology    2. Folic acid deficiency  Overview:  Poor control - begin folic acid 5mg daily    Orders:  -     Folic Acid 5 MG capsule; Take 5 mg by mouth Daily.  Dispense: 30 each; Refill: 5    3. Allergic rhinitis, unspecified seasonality, unspecified trigger  -     cetirizine (zyrTEC) 10 MG tablet; Take 1 tablet by mouth Daily.  Dispense: 30 tablet; Refill: 5  -     mometasone (Nasonex) 50 MCG/ACT nasal spray; 2 sprays into the nostril(s) as directed by provider Daily.  Dispense: 17 g; Refill: 5  -     montelukast (SINGULAIR) 10 MG tablet; Take 1 tablet by mouth Every Night.  Dispense: 30 tablet; Refill: 5    4. Fecal incontinence alternating with constipation  Comments:  Poor control, insurance would not approve his linzess  Orders:  -     linaclotide (LINZESS) 290 MCG capsule capsule; Take 1 capsule by mouth Every Morning Before Breakfast.  Dispense: 30 capsule; Refill: 11  -     Ambulatory Referral to Gastroenterology    5. Vitamin B12 deficiency  Overview:  Poor control - begin folic acid 5mg daily    Orders:  -     vitamin B-12 (CYANOCOBALAMIN) 1000 MCG tablet; Take 1 tablet by mouth Daily.  Dispense: 30 tablet; Refill: 5  -     vitamin B-6 (PYRIDOXINE) 50 MG tablet; Take 1 tablet by mouth Daily.  Dispense: 30 tablet; Refill: 5    6. Depression, unspecified depression type  -     Ambulatory Referral to Psychiatry    7. Anxiety  -     Ambulatory Referral to Psychiatry    8. Abnormal weight loss  Comments:  Pt has seen nutritionist - small freq meals, encouraged use of supplements and foods     Pt has seen neurology, hematology, GI in  the past.      Discussed financial situation - encouraged mom to go to social security office for assistance.  Food assistance, etc.    Discussed at length - importance of eating small freq meals.    Follow Up     Return in about 3 months (around 10/12/2022).    Patient was given instructions and counseling regarding his condition or for health maintenance advice. Please see specific information pulled into the AVS if appropriate.     I have reviewed information obtained and documented by others and I have confirmed the accuracy of this documented note.    DENNYS Gibbs

## 2022-10-11 ENCOUNTER — TELEPHONE (OUTPATIENT)
Dept: FAMILY MEDICINE CLINIC | Facility: CLINIC | Age: 27
End: 2022-10-11

## 2022-10-11 ENCOUNTER — OFFICE VISIT (OUTPATIENT)
Dept: FAMILY MEDICINE CLINIC | Facility: CLINIC | Age: 27
End: 2022-10-11

## 2022-10-11 VITALS
WEIGHT: 122 LBS | OXYGEN SATURATION: 99 % | HEART RATE: 82 BPM | DIASTOLIC BLOOD PRESSURE: 70 MMHG | BODY MASS INDEX: 15.25 KG/M2 | SYSTOLIC BLOOD PRESSURE: 100 MMHG

## 2022-10-11 DIAGNOSIS — K59.00 FECAL INCONTINENCE ALTERNATING WITH CONSTIPATION: Chronic | ICD-10-CM

## 2022-10-11 DIAGNOSIS — J45.909 ASTHMA, UNSPECIFIED ASTHMA SEVERITY, UNSPECIFIED WHETHER COMPLICATED, UNSPECIFIED WHETHER PERSISTENT: Primary | ICD-10-CM

## 2022-10-11 DIAGNOSIS — R15.9 FECAL INCONTINENCE ALTERNATING WITH CONSTIPATION: Chronic | ICD-10-CM

## 2022-10-11 DIAGNOSIS — E53.8 FOLIC ACID DEFICIENCY: ICD-10-CM

## 2022-10-11 DIAGNOSIS — E53.8 VITAMIN B12 DEFICIENCY: ICD-10-CM

## 2022-10-11 DIAGNOSIS — E55.9 VITAMIN D DEFICIENCY: ICD-10-CM

## 2022-10-11 DIAGNOSIS — Z23 NEED FOR INFLUENZA VACCINATION: ICD-10-CM

## 2022-10-11 DIAGNOSIS — J30.9 ALLERGIC RHINITIS, UNSPECIFIED SEASONALITY, UNSPECIFIED TRIGGER: ICD-10-CM

## 2022-10-11 DIAGNOSIS — K31.84 GASTROPARESIS: ICD-10-CM

## 2022-10-11 PROCEDURE — 90471 IMMUNIZATION ADMIN: CPT | Performed by: PHYSICIAN ASSISTANT

## 2022-10-11 PROCEDURE — 99213 OFFICE O/P EST LOW 20 MIN: CPT | Performed by: PHYSICIAN ASSISTANT

## 2022-10-11 PROCEDURE — 90686 IIV4 VACC NO PRSV 0.5 ML IM: CPT | Performed by: PHYSICIAN ASSISTANT

## 2022-10-11 RX ORDER — OMEPRAZOLE 40 MG/1
40 CAPSULE, DELAYED RELEASE ORAL DAILY
Qty: 30 CAPSULE | Refills: 5 | Status: SHIPPED | OUTPATIENT
Start: 2022-10-11 | End: 2022-10-12 | Stop reason: SDUPTHER

## 2022-10-11 RX ORDER — SUCRALFATE 1 G/1
1 TABLET ORAL 4 TIMES DAILY
Qty: 120 TABLET | Refills: 5 | Status: SHIPPED | OUTPATIENT
Start: 2022-10-11

## 2022-10-11 RX ORDER — FOLIC ACID 5 MG
5 CAPSULE ORAL DAILY
Qty: 30 EACH | Refills: 5 | Status: SHIPPED | OUTPATIENT
Start: 2022-10-11

## 2022-10-11 RX ORDER — LANOLIN ALCOHOL/MO/W.PET/CERES
1000 CREAM (GRAM) TOPICAL DAILY
Qty: 30 TABLET | Refills: 5 | Status: SHIPPED | OUTPATIENT
Start: 2022-10-11

## 2022-10-11 RX ORDER — ONDANSETRON 8 MG/1
8 TABLET, ORALLY DISINTEGRATING ORAL EVERY 8 HOURS PRN
Qty: 30 TABLET | Refills: 2 | Status: SHIPPED | OUTPATIENT
Start: 2022-10-11

## 2022-10-11 RX ORDER — MONTELUKAST SODIUM 10 MG/1
10 TABLET ORAL NIGHTLY
Qty: 30 TABLET | Refills: 5 | Status: SHIPPED | OUTPATIENT
Start: 2022-10-11

## 2022-10-11 RX ORDER — ERGOCALCIFEROL 1.25 MG/1
50000 CAPSULE ORAL WEEKLY
Qty: 13 CAPSULE | Refills: 1 | Status: SHIPPED | OUTPATIENT
Start: 2022-10-11

## 2022-10-11 RX ORDER — ALBUTEROL SULFATE 0.63 MG/3ML
1 SOLUTION RESPIRATORY (INHALATION) 3 TIMES DAILY PRN
Qty: 90 EACH | Refills: 5 | Status: SHIPPED | OUTPATIENT
Start: 2022-10-11 | End: 2022-11-10

## 2022-10-11 RX ORDER — BUDESONIDE 0.25 MG/2ML
0.25 INHALANT ORAL
Qty: 60 ML | Refills: 5 | Status: SHIPPED | OUTPATIENT
Start: 2022-10-11 | End: 2022-11-10

## 2022-10-11 NOTE — TELEPHONE ENCOUNTER
PATIENT'S MOTHER CALLED IN FROM PHARMACY TO INFORM US THERE ARE ISSUES WITH THE MEDICATION SENT FROM US. PLEASE CALL PATIENT BACK ASAP PATIENTS MOTHER WANTS THIS TAKEN CARE OF BEFORE SHE LEAVES THE PHARMACY.   Nursing home

## 2022-10-11 NOTE — TELEPHONE ENCOUNTER
Pts mother called again requesting PA for budesonide (PULMICORT) 0.25 MG/2ML nebulizer solution . Caller stated this needs to be done ASAP and she was told to call Luis if there were any problems with getting medication. Advised caller they are in clinic and once pharmacy sends over info regarding PA they will work on it. Caller was upset that this will has not been completed yet because the pharmacy faxed PA request 20 mins ago.

## 2022-10-11 NOTE — PROGRESS NOTES
Chief Complaint  Vitamin D Deficiency (3 month follow up) and gastroparesis    SUBJECTIVE  Corentez Woodrow Hathaway presents to [facility]    History of Present Illness   Pt presents today for 3 month follow up.    Pt would like to discuss new PCP options.    Pt states he would like to discuss getting an inhaler.    Pt has gained 6 lbs since last ov.  He agrees to cont to eat.     [facility]  Past Medical History:   Diagnosis Date   • Allergic rhinitis    • Anxiety    • Asthma 07/28/2015   • Chronic bronchitis (HCC)    • Depression    • Esophagitis, eosinophilic 08/18/2016   • Fecal incontinence alternating with constipation 08/18/2016   • Gastroparesis 07/28/2015   • Jaundice    • 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 08/18/2016   • Mental impairment 08/18/2016   • Panic attacks    • Pelvic floor dysfunction 07/28/2015   • Special educational needs 03/14/2017   • Speech impediment 08/18/2016   • Vitamin B12 deficiency 07/18/2016   • Vitamin D deficiency 08/18/2016   • Weight loss       Family History   Family history unknown: Yes      History reviewed. No pertinent surgical history.     Current Outpatient Medications:   •  acetaminophen (TYLENOL) 325 MG tablet, Tylenol 325 mg oral tablet take 1 tablet (325 mg) by oral route every 4 hours as needed   Suspended, Disp: , Rfl:   •  albuterol (ACCUNEB) 0.63 MG/3ML nebulizer solution, Take 3 mL by nebulization 3 (Three) Times a Day As Needed for Wheezing for up to 30 days., Disp: 90 each, Rfl: 5  •  Alcaftadine (Lastacaft) 0.25 % solution, 1 drop., Disp: , Rfl:   •  Bepotastine Besilate 1.5 % solution, 1 drop., Disp: , Rfl:   •  budesonide (PULMICORT) 0.25 MG/2ML nebulizer solution, Take 2 mL by nebulization Daily for 30 days., Disp: 60 mL, Rfl: 5  •  carbamide peroxide (DEBROX) 6.5 % otic solution, 5 drops., Disp: , Rfl:   •  cetirizine (zyrTEC) 10 MG tablet, Take 1 tablet by mouth Daily.,  Disp: 30 tablet, Rfl: 5  •  Cyanocobalamin (Vitamin B-12 ER) 1000 MCG tablet controlled-release, Take 1 tablet by mouth Daily., Disp: , Rfl:   •  EPINEPHrine (EPIPEN) 0.3 MG/0.3ML solution auto-injector injection, 1 (One) Time., Disp: , Rfl:   •  Folic Acid 5 MG capsule, Take 5 mg by mouth Daily., Disp: 30 each, Rfl: 5  •  ketotifen (ZADITOR) 0.025 % ophthalmic solution, 1 drop., Disp: , Rfl:   •  linaclotide (LINZESS) 290 MCG capsule capsule, Take 1 capsule by mouth Every Morning Before Breakfast., Disp: 30 capsule, Rfl: 11  •  loperamide (IMODIUM A-D) 2 MG tablet, loperamide 2 mg oral tablet q 4 hours as needed   Active, Disp: , Rfl:   •  mometasone (Nasonex) 50 MCG/ACT nasal spray, 2 sprays into the nostril(s) as directed by provider Daily., Disp: 17 g, Rfl: 5  •  montelukast (SINGULAIR) 10 MG tablet, Take 1 tablet by mouth Every Night., Disp: 30 tablet, Rfl: 5  •  omeprazole (priLOSEC) 40 MG capsule, Take 1 capsule by mouth Daily., Disp: 30 capsule, Rfl: 5  •  ondansetron ODT (ZOFRAN-ODT) 8 MG disintegrating tablet, Place 1 tablet on the tongue Every 8 (Eight) Hours As Needed for Nausea or Vomiting., Disp: 30 tablet, Rfl: 2  •  sodium chloride 0.9 % nebulizer solution 0.82 mL with albuterol (5 MG/ML) 0.5% nebulizer solution 0.9 mg, Take 15 mg/hr by nebulization Daily As Needed (wheezing)., Disp: , Rfl:   •  sucralfate (Carafate) 1 g tablet, Take 1 tablet by mouth 4 (Four) Times a Day., Disp: 120 tablet, Rfl: 5  •  vitamin B-12 (CYANOCOBALAMIN) 1000 MCG tablet, Take 1 tablet by mouth Daily., Disp: 30 tablet, Rfl: 5  •  vitamin B-6 (PYRIDOXINE) 50 MG tablet, Take 1 tablet by mouth Daily., Disp: 30 tablet, Rfl: 5  •  vitamin D (ERGOCALCIFEROL) 1.25 MG (19048 UT) capsule capsule, Take 1 capsule by mouth 1 (One) Time Per Week., Disp: 13 capsule, Rfl: 1    OBJECTIVE  Vital Signs:   /70 (BP Location: Left arm)   Pulse 82   Wt 55.3 kg (122 lb)   SpO2 99%   BMI 15.25 kg/m²    Estimated body mass index is 15.25  "kg/m² as calculated from the following:    Height as of 9/11/22: 190.5 cm (75\").    Weight as of this encounter: 55.3 kg (122 lb).     Wt Readings from Last 3 Encounters:   10/11/22 55.3 kg (122 lb)   09/11/22 52.6 kg (116 lb)   07/12/22 54.9 kg (121 lb)     BP Readings from Last 3 Encounters:   10/11/22 100/70   09/11/22 116/79   07/12/22 105/68       Physical Exam  Vitals and nursing note reviewed.   Constitutional:       Comments: Very thin   HENT:      Head: Normocephalic and atraumatic.   Cardiovascular:      Rate and Rhythm: Normal rate and regular rhythm.      Heart sounds: Normal heart sounds.   Pulmonary:      Effort: Pulmonary effort is normal.      Breath sounds: Normal breath sounds.   Musculoskeletal:      Cervical back: Neck supple.   Neurological:      Mental Status: He is alert.   Psychiatric:         Mood and Affect: Mood normal.         Behavior: Behavior normal.          Result Review        XR Ankle 3+ View Left    Result Date: 9/11/2022   Normal study.      THAIS FOLEY MD       Electronically Signed and Approved By: THAIS FOLEY MD on 9/11/2022 at 17:47             XR Foot 3+ View Left    Result Date: 9/11/2022   Normal study.      THAIS FOLEY MD       Electronically Signed and Approved By: THAIS FOLEY MD on 9/11/2022 at 17:46                 The above data has been reviewed by DENNYS Gibbs 10/11/2022 10:16 EDT.          Patient Care Team:  Luis Soler PA as PCP - General (Physician Assistant)    ASSESSMENT & PLAN    Diagnoses and all orders for this visit:    1. Asthma, unspecified asthma severity, unspecified whether complicated, unspecified whether persistent (Primary)  -     albuterol (ACCUNEB) 0.63 MG/3ML nebulizer solution; Take 3 mL by nebulization 3 (Three) Times a Day As Needed for Wheezing for up to 30 days.  Dispense: 90 each; Refill: 5  -     budesonide (PULMICORT) 0.25 MG/2ML nebulizer solution; Take 2 mL by nebulization Daily for 30 days.  Dispense: 60 mL; Refill: 5    2. Folic acid " deficiency  -     Folic Acid 5 MG capsule; Take 5 mg by mouth Daily.  Dispense: 30 each; Refill: 5    3. Fecal incontinence alternating with constipation  Comments:  Poor control, insurance would not approve his linzess  Orders:  -     linaclotide (LINZESS) 290 MCG capsule capsule; Take 1 capsule by mouth Every Morning Before Breakfast.  Dispense: 30 capsule; Refill: 11    4. Allergic rhinitis, unspecified seasonality, unspecified trigger  -     montelukast (SINGULAIR) 10 MG tablet; Take 1 tablet by mouth Every Night.  Dispense: 30 tablet; Refill: 5    5. Gastroparesis  -     omeprazole (priLOSEC) 40 MG capsule; Take 1 capsule by mouth Daily.  Dispense: 30 capsule; Refill: 5  -     ondansetron ODT (ZOFRAN-ODT) 8 MG disintegrating tablet; Place 1 tablet on the tongue Every 8 (Eight) Hours As Needed for Nausea or Vomiting.  Dispense: 30 tablet; Refill: 2  -     sucralfate (Carafate) 1 g tablet; Take 1 tablet by mouth 4 (Four) Times a Day.  Dispense: 120 tablet; Refill: 5    6. Vitamin B12 deficiency  Overview:  Poor control - begin folic acid 5mg daily    Orders:  -     vitamin B-12 (CYANOCOBALAMIN) 1000 MCG tablet; Take 1 tablet by mouth Daily.  Dispense: 30 tablet; Refill: 5    7. Vitamin D deficiency  Overview:  Poor control take Vit D 5000 IU daily and Vit D 50,000 IU weekly    Orders:  -     vitamin D (ERGOCALCIFEROL) 1.25 MG (15076 UT) capsule capsule; Take 1 capsule by mouth 1 (One) Time Per Week.  Dispense: 13 capsule; Refill: 1    8. Need for influenza vaccination  -     FluLaval/Fluzone >6 mos ([id number])       Tobacco Use: Low Risk    • Smoking Tobacco Use: Never   • Smokeless Tobacco Use: Never   • Passive Exposure: Not on file       Follow Up     Return in about 3 months (around 1/11/2023).      Patient was given instructions and counseling regarding his condition or for health maintenance advice. Please see specific information pulled into the AVS if appropriate.   I have reviewed information obtained  and documented by others and I have confirmed the accuracy of this documented note.    DENNYS Gibbs

## 2022-10-12 DIAGNOSIS — K31.84 GASTROPARESIS: ICD-10-CM

## 2022-10-12 RX ORDER — OMEPRAZOLE 40 MG/1
40 CAPSULE, DELAYED RELEASE ORAL DAILY
Qty: 30 CAPSULE | Refills: 5 | Status: SHIPPED | OUTPATIENT
Start: 2022-10-12

## 2022-12-05 PROCEDURE — 87081 CULTURE SCREEN ONLY: CPT

## 2023-07-25 ENCOUNTER — LAB (OUTPATIENT)
Dept: LAB | Facility: HOSPITAL | Age: 28
End: 2023-07-25
Payer: COMMERCIAL

## 2023-07-25 ENCOUNTER — OFFICE VISIT (OUTPATIENT)
Dept: FAMILY MEDICINE CLINIC | Facility: CLINIC | Age: 28
End: 2023-07-25
Payer: COMMERCIAL

## 2023-07-25 VITALS
WEIGHT: 124.2 LBS | DIASTOLIC BLOOD PRESSURE: 74 MMHG | OXYGEN SATURATION: 95 % | BODY MASS INDEX: 15.44 KG/M2 | HEIGHT: 75 IN | SYSTOLIC BLOOD PRESSURE: 117 MMHG | HEART RATE: 82 BPM

## 2023-07-25 DIAGNOSIS — Z91.018 NUT ALLERGY: ICD-10-CM

## 2023-07-25 DIAGNOSIS — Z13.29 SCREENING FOR THYROID DISORDER: ICD-10-CM

## 2023-07-25 DIAGNOSIS — R20.2 NUMBNESS AND TINGLING OF BOTH LOWER EXTREMITIES: ICD-10-CM

## 2023-07-25 DIAGNOSIS — E55.9 VITAMIN D DEFICIENCY: ICD-10-CM

## 2023-07-25 DIAGNOSIS — R20.2 NUMBNESS AND TINGLING OF HAND: ICD-10-CM

## 2023-07-25 DIAGNOSIS — Z23 NEED FOR SECOND BOOSTER DOSE OF COVID-19 VACCINE: ICD-10-CM

## 2023-07-25 DIAGNOSIS — R63.6 UNDERWEIGHT: ICD-10-CM

## 2023-07-25 DIAGNOSIS — K31.84 GASTROPARESIS: ICD-10-CM

## 2023-07-25 DIAGNOSIS — J45.30 MILD PERSISTENT ASTHMA, UNSPECIFIED WHETHER COMPLICATED: ICD-10-CM

## 2023-07-25 DIAGNOSIS — Z13.220 SCREENING FOR LIPID DISORDERS: ICD-10-CM

## 2023-07-25 DIAGNOSIS — E53.8 FOLIC ACID DEFICIENCY: ICD-10-CM

## 2023-07-25 DIAGNOSIS — H10.13 ALLERGIC CONJUNCTIVITIS OF BOTH EYES: ICD-10-CM

## 2023-07-25 DIAGNOSIS — E53.8 VITAMIN B12 DEFICIENCY: ICD-10-CM

## 2023-07-25 DIAGNOSIS — Z00.00 ANNUAL PHYSICAL EXAM: Primary | ICD-10-CM

## 2023-07-25 DIAGNOSIS — Z00.00 ANNUAL PHYSICAL EXAM: ICD-10-CM

## 2023-07-25 DIAGNOSIS — K59.02 CONSTIPATION DUE TO OUTLET DYSFUNCTION: ICD-10-CM

## 2023-07-25 DIAGNOSIS — J30.9 ALLERGIC RHINITIS, UNSPECIFIED SEASONALITY, UNSPECIFIED TRIGGER: ICD-10-CM

## 2023-07-25 DIAGNOSIS — R20.0 NUMBNESS AND TINGLING OF HAND: ICD-10-CM

## 2023-07-25 DIAGNOSIS — R20.0 NUMBNESS AND TINGLING OF BOTH LOWER EXTREMITIES: ICD-10-CM

## 2023-07-25 LAB
25(OH)D3 SERPL-MCNC: 24.2 NG/ML (ref 30–100)
ALBUMIN SERPL-MCNC: 4.4 G/DL (ref 3.5–5.2)
ALBUMIN/GLOB SERPL: 1.8 G/DL
ALP SERPL-CCNC: 81 U/L (ref 39–117)
ALT SERPL W P-5'-P-CCNC: 12 U/L (ref 1–41)
ANION GAP SERPL CALCULATED.3IONS-SCNC: 12.8 MMOL/L (ref 5–15)
AST SERPL-CCNC: 16 U/L (ref 1–40)
BASOPHILS # BLD AUTO: 0.03 10*3/MM3 (ref 0–0.2)
BASOPHILS NFR BLD AUTO: 0.4 % (ref 0–1.5)
BILIRUB SERPL-MCNC: 1.2 MG/DL (ref 0–1.2)
BUN SERPL-MCNC: 7 MG/DL (ref 6–20)
BUN/CREAT SERPL: 7.2 (ref 7–25)
CALCIUM SPEC-SCNC: 9.5 MG/DL (ref 8.6–10.5)
CHLORIDE SERPL-SCNC: 106 MMOL/L (ref 98–107)
CHOLEST SERPL-MCNC: 153 MG/DL (ref 0–200)
CO2 SERPL-SCNC: 24.2 MMOL/L (ref 22–29)
CREAT SERPL-MCNC: 0.97 MG/DL (ref 0.76–1.27)
DEPRECATED RDW RBC AUTO: 40 FL (ref 37–54)
EGFRCR SERPLBLD CKD-EPI 2021: 109 ML/MIN/1.73
EOSINOPHIL # BLD AUTO: 0.22 10*3/MM3 (ref 0–0.4)
EOSINOPHIL NFR BLD AUTO: 3.2 % (ref 0.3–6.2)
ERYTHROCYTE [DISTWIDTH] IN BLOOD BY AUTOMATED COUNT: 12.6 % (ref 12.3–15.4)
FOLATE SERPL-MCNC: 4.38 NG/ML (ref 4.78–24.2)
GLOBULIN UR ELPH-MCNC: 2.4 GM/DL
GLUCOSE SERPL-MCNC: 104 MG/DL (ref 65–99)
HCT VFR BLD AUTO: 38.9 % (ref 37.5–51)
HDLC SERPL-MCNC: 61 MG/DL (ref 40–60)
HGB BLD-MCNC: 13.1 G/DL (ref 13–17.7)
IMM GRANULOCYTES # BLD AUTO: 0.03 10*3/MM3 (ref 0–0.05)
IMM GRANULOCYTES NFR BLD AUTO: 0.4 % (ref 0–0.5)
IRON 24H UR-MRATE: 93 MCG/DL (ref 59–158)
IRON SATN MFR SERPL: 25 % (ref 20–50)
LDLC SERPL CALC-MCNC: 81 MG/DL (ref 0–100)
LDLC/HDLC SERPL: 1.34 {RATIO}
LYMPHOCYTES # BLD AUTO: 2.49 10*3/MM3 (ref 0.7–3.1)
LYMPHOCYTES NFR BLD AUTO: 36 % (ref 19.6–45.3)
MCH RBC QN AUTO: 29.4 PG (ref 26.6–33)
MCHC RBC AUTO-ENTMCNC: 33.7 G/DL (ref 31.5–35.7)
MCV RBC AUTO: 87.4 FL (ref 79–97)
MONOCYTES # BLD AUTO: 0.59 10*3/MM3 (ref 0.1–0.9)
MONOCYTES NFR BLD AUTO: 8.5 % (ref 5–12)
NEUTROPHILS NFR BLD AUTO: 3.56 10*3/MM3 (ref 1.7–7)
NEUTROPHILS NFR BLD AUTO: 51.5 % (ref 42.7–76)
NRBC BLD AUTO-RTO: 0 /100 WBC (ref 0–0.2)
PLATELET # BLD AUTO: 274 10*3/MM3 (ref 140–450)
PMV BLD AUTO: 10.6 FL (ref 6–12)
POTASSIUM SERPL-SCNC: 3.7 MMOL/L (ref 3.5–5.2)
PROT SERPL-MCNC: 6.8 G/DL (ref 6–8.5)
RBC # BLD AUTO: 4.45 10*6/MM3 (ref 4.14–5.8)
SODIUM SERPL-SCNC: 143 MMOL/L (ref 136–145)
T4 FREE SERPL-MCNC: 1.28 NG/DL (ref 0.93–1.7)
TIBC SERPL-MCNC: 371 MCG/DL (ref 298–536)
TRANSFERRIN SERPL-MCNC: 249 MG/DL (ref 200–360)
TRIGL SERPL-MCNC: 52 MG/DL (ref 0–150)
TSH SERPL DL<=0.05 MIU/L-ACNC: 1.96 UIU/ML (ref 0.27–4.2)
VIT B12 BLD-MCNC: 335 PG/ML (ref 211–946)
VLDLC SERPL-MCNC: 11 MG/DL (ref 5–40)
WBC NRBC COR # BLD: 6.92 10*3/MM3 (ref 3.4–10.8)

## 2023-07-25 PROCEDURE — 1160F RVW MEDS BY RX/DR IN RCRD: CPT

## 2023-07-25 PROCEDURE — 1159F MED LIST DOCD IN RCRD: CPT

## 2023-07-25 PROCEDURE — 82607 VITAMIN B-12: CPT

## 2023-07-25 PROCEDURE — 99395 PREV VISIT EST AGE 18-39: CPT

## 2023-07-25 PROCEDURE — 80061 LIPID PANEL: CPT

## 2023-07-25 PROCEDURE — 84443 ASSAY THYROID STIM HORMONE: CPT

## 2023-07-25 PROCEDURE — 91312 COVID-19 (PFIZER) BIVALENT 12+YRS: CPT

## 2023-07-25 PROCEDURE — 82746 ASSAY OF FOLIC ACID SERUM: CPT

## 2023-07-25 PROCEDURE — 0124A PR ADM SARSCOV2 30MCG/0.3ML BST: CPT

## 2023-07-25 PROCEDURE — 3008F BODY MASS INDEX DOCD: CPT

## 2023-07-25 PROCEDURE — 36415 COLL VENOUS BLD VENIPUNCTURE: CPT

## 2023-07-25 PROCEDURE — 80053 COMPREHEN METABOLIC PANEL: CPT

## 2023-07-25 PROCEDURE — 83540 ASSAY OF IRON: CPT

## 2023-07-25 PROCEDURE — 82306 VITAMIN D 25 HYDROXY: CPT

## 2023-07-25 PROCEDURE — 84439 ASSAY OF FREE THYROXINE: CPT

## 2023-07-25 PROCEDURE — 85025 COMPLETE CBC W/AUTO DIFF WBC: CPT

## 2023-07-25 PROCEDURE — 84466 ASSAY OF TRANSFERRIN: CPT

## 2023-07-25 RX ORDER — CETIRIZINE HYDROCHLORIDE 10 MG/1
10 TABLET ORAL DAILY
Qty: 30 TABLET | Refills: 5 | Status: SHIPPED | OUTPATIENT
Start: 2023-07-25

## 2023-07-25 RX ORDER — BUDESONIDE AND FORMOTEROL FUMARATE DIHYDRATE 80; 4.5 UG/1; UG/1
2 AEROSOL RESPIRATORY (INHALATION)
Qty: 10.2 G | Refills: 12 | Status: SHIPPED | OUTPATIENT
Start: 2023-07-25

## 2023-07-25 RX ORDER — ALBUTEROL SULFATE 0.63 MG/3ML
1 SOLUTION RESPIRATORY (INHALATION) EVERY 4 HOURS PRN
Qty: 12 ML | Refills: 5 | Status: SHIPPED | OUTPATIENT
Start: 2023-07-25

## 2023-07-25 RX ORDER — LANOLIN ALCOHOL/MO/W.PET/CERES
50 CREAM (GRAM) TOPICAL DAILY
Qty: 30 TABLET | Refills: 5 | Status: SHIPPED | OUTPATIENT
Start: 2023-07-25

## 2023-07-25 RX ORDER — MONTELUKAST SODIUM 10 MG/1
10 TABLET ORAL NIGHTLY
Qty: 30 TABLET | Refills: 5 | Status: SHIPPED | OUTPATIENT
Start: 2023-07-25

## 2023-07-25 RX ORDER — OMEPRAZOLE 40 MG/1
40 CAPSULE, DELAYED RELEASE ORAL DAILY
Qty: 30 CAPSULE | Refills: 5 | Status: SHIPPED | OUTPATIENT
Start: 2023-07-25

## 2023-07-25 RX ORDER — KETOTIFEN FUMARATE 0.35 MG/ML
1 SOLUTION/ DROPS OPHTHALMIC 2 TIMES DAILY
Qty: 10 ML | Refills: 2 | Status: SHIPPED | OUTPATIENT
Start: 2023-07-25

## 2023-07-25 RX ORDER — LANOLIN ALCOHOL/MO/W.PET/CERES
1000 CREAM (GRAM) TOPICAL DAILY
Qty: 30 TABLET | Refills: 5 | Status: SHIPPED | OUTPATIENT
Start: 2023-07-25

## 2023-07-25 RX ORDER — ONDANSETRON 8 MG/1
8 TABLET, ORALLY DISINTEGRATING ORAL EVERY 8 HOURS PRN
Qty: 30 TABLET | Refills: 2 | Status: SHIPPED | OUTPATIENT
Start: 2023-07-25

## 2023-07-25 RX ORDER — ERGOCALCIFEROL 1.25 MG/1
50000 CAPSULE ORAL WEEKLY
Qty: 13 CAPSULE | Refills: 1 | Status: SHIPPED | OUTPATIENT
Start: 2023-07-25

## 2023-07-25 RX ORDER — FOLIC ACID 5 MG
5 CAPSULE ORAL DAILY
Qty: 30 EACH | Refills: 5 | Status: SHIPPED | OUTPATIENT
Start: 2023-07-25

## 2023-07-25 RX ORDER — SUCRALFATE 1 G/1
1 TABLET ORAL 4 TIMES DAILY
Qty: 120 TABLET | Refills: 5 | Status: SHIPPED | OUTPATIENT
Start: 2023-07-25

## 2023-07-25 NOTE — PROGRESS NOTES
Chief Complaint  Establish Care, annual physical exam, asthma, allergies, gastroparesis    SUBJECTIVE  Corentez Woodrow Hathaway presents to [facility]    History of Present Illness  Patient is a 28-year-old male who presents today to establish care with new PCP.  Patient's former PCP is DENNYS Gibbs.  He reports has been out of his medications for several months.  Patient needs chronic condition management of allergic rhinitis, asthma, gastroparesis, vitamin B12 deficiency, vitamin D deficiency, folate deficiency, underweight, constipation, allergic conjunctivitis.  His mom accompanies him today.     Reports he was formally seen a GI specialist in [place] but is unsure of who.  Patient reports he is going to find out and request referral back to them.  Patient was diagnosed with gastroparesis.  He also suffers   from constipation.  Patient reports he still struggling with this.  Patient reports he was formally on Carafate, Zofran, Prilosec, Linzess.  Patient reports he is working well to control symptoms.  Patient is requesting refills for this again.    Patient is on vitamin B12 vitamin D vitamin B6 and folate replacement for deficiencies.  Patient needs refills on any medications at this time.  Patient is due lab work to assess levels.    Patient's BMI is 15.  Patient is underweight and malnourished.  Patient reports his weight fluctuates.  Patient reports he was told to either meals today with snacks in between.  Patient has not yet tried protein shakes.    Patient reports over the past several months he has noticed numbness and tingling in his hands.  Patient reports his hands will turn white in color and then go numb.  Patient reports once the color returns he has burning.  Patient also reports he has numbness and tingling down both of his legs.  Patient reports he occasionally has severe episodes of pain attacks.  Patient reports on the side and is not able to stand up  straight.  Patient is not ever had any EMG testing done.    Patient is requesting furl to family allergy for management of his allergy symptoms.  Patient reports he was told in the past that he was allergic to nuts.  Patient has EpiPen but reports he has never had a reaction.  Patient would like reevaluation of this.    Patient has asthma.  Patient was formally on Symbicort and albuterol nebulizer solution.  Patient reports has been out of these for a while.  Patient requesting refills at this time.  Patient reports he is never seen pulmonology.  Patient is open to seeing them for treatment management.  He reports he has been on several inhaled medications including Trellegy  but he had adverse reactions to these.    Patient is due labs. Orders placed at today's visit. Discussed with patient that these are fasting labs.     Patient is due COVID booster.  Patient is agreeable to have in office today.    No other concerns or complaints at this time.               Past Medical History:   Diagnosis Date    Allergic rhinitis     Anxiety     Asthma 07/28/2015    Chronic bronchitis     Depression     Esophagitis, eosinophilic 08/18/2016    Fecal incontinence alternating with constipation 08/18/2016    Gastroparesis 07/28/2015    Jaundice     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 08/18/2016    Mental impairment 08/18/2016    Panic attacks     Pelvic floor dysfunction 07/28/2015    Special educational needs 03/14/2017    Speech impediment 08/18/2016    Vitamin B12 deficiency 07/18/2016    Vitamin D deficiency 08/18/2016    Weight loss       Family History   Family history unknown: Yes      History reviewed. No pertinent surgical history.     Current Outpatient Medications:     acetaminophen (TYLENOL) 325 MG tablet, Tylenol 325 mg oral tablet take 1 tablet (325 mg) by oral route every 4 hours as needed   Suspended, Disp: , Rfl:     albuterol (ACCUNEB) 0.63 MG/3ML nebulizer solution, Take 3 mL by nebulization Every 4  "(Four) Hours As Needed for Wheezing or Shortness of Air., Disp: 12 mL, Rfl: 5    cetirizine (zyrTEC) 10 MG tablet, Take 1 tablet by mouth Daily., Disp: 30 tablet, Rfl: 5    EPINEPHrine (EPIPEN) 0.3 MG/0.3ML solution auto-injector injection, 1 (One) Time., Disp: , Rfl:     Folic Acid 5 MG capsule, Take 5 mg by mouth Daily., Disp: 30 each, Rfl: 5    ketotifen (ZADITOR) 0.025 % ophthalmic solution, Apply 1 drop to eye(s) as directed by provider 2 (Two) Times a Day., Disp: 10 mL, Rfl: 2    linaclotide (LINZESS) 290 MCG capsule capsule, Take 1 capsule by mouth Every Morning Before Breakfast., Disp: 30 capsule, Rfl: 11    mometasone (Nasonex) 50 MCG/ACT nasal spray, 2 sprays into the nostril(s) as directed by provider Daily., Disp: 17 g, Rfl: 5    montelukast (SINGULAIR) 10 MG tablet, Take 1 tablet by mouth Every Night., Disp: 30 tablet, Rfl: 5    omeprazole (priLOSEC) 40 MG capsule, Take 1 capsule by mouth Daily. Patient has been taking PPI, Disp: 30 capsule, Rfl: 5    ondansetron ODT (ZOFRAN-ODT) 8 MG disintegrating tablet, Place 1 tablet on the tongue Every 8 (Eight) Hours As Needed for Nausea or Vomiting., Disp: 30 tablet, Rfl: 2    sucralfate (Carafate) 1 g tablet, Take 1 tablet by mouth 4 (Four) Times a Day., Disp: 120 tablet, Rfl: 5    vitamin B-12 (CYANOCOBALAMIN) 1000 MCG tablet, Take 1 tablet by mouth Daily., Disp: 30 tablet, Rfl: 5    vitamin B-6 (PYRIDOXINE) 50 MG tablet, Take 1 tablet by mouth Daily., Disp: 30 tablet, Rfl: 5    vitamin D (ERGOCALCIFEROL) 1.25 MG (44475 UT) capsule capsule, Take 1 capsule by mouth 1 (One) Time Per Week., Disp: 13 capsule, Rfl: 1    budesonide-formoterol (Symbicort) 80-4.5 MCG/ACT inhaler, Inhale 2 puffs 2 (Two) Times a Day., Disp: 10.2 g, Rfl: 12    OBJECTIVE  Vital Signs:   /74 (BP Location: Right arm, Patient Position: Sitting, Cuff Size: Adult)   Pulse 82   Ht 190.5 cm (75\")   Wt 56.3 kg (124 lb 3.2 oz)   SpO2 95%   BMI 15.52 kg/m²    Estimated body mass index is " "15.52 kg/m² as calculated from the following:    Height as of this encounter: 190.5 cm (75\").    Weight as of this encounter: 56.3 kg (124 lb 3.2 oz).     Wt Readings from Last 3 Encounters:   07/25/23 56.3 kg (124 lb 3.2 oz)   12/05/22 56.7 kg (125 lb)   10/11/22 55.3 kg (122 lb)     BP Readings from Last 3 Encounters:   07/25/23 117/74   12/05/22 107/80   10/11/22 100/70       Physical Exam  Vitals reviewed.   Constitutional:       General: He is awake.      Appearance: He is well-developed, well-groomed and underweight.   HENT:      Head: Normocephalic and atraumatic.   Eyes:      Extraocular Movements: Extraocular movements intact.      Conjunctiva/sclera: Conjunctivae normal.   Cardiovascular:      Rate and Rhythm: Normal rate and regular rhythm.      Heart sounds: Normal heart sounds.   Pulmonary:      Effort: Pulmonary effort is normal.      Breath sounds: Normal breath sounds and air entry. No decreased breath sounds, wheezing, rhonchi or rales.   Abdominal:      General: Abdomen is flat. Bowel sounds are normal.      Palpations: Abdomen is soft.      Tenderness: There is generalized abdominal tenderness.   Musculoskeletal:         General: Normal range of motion.      Cervical back: Normal range of motion.   Skin:     General: Skin is warm and dry.   Neurological:      General: No focal deficit present.      Mental Status: He is alert and oriented to person, place, and time.   Psychiatric:         Mood and Affect: Mood normal.         Behavior: Behavior normal. Behavior is cooperative.         Thought Content: Thought content normal.         Judgment: Judgment normal.        Result Review        No Images in the past 120 days found..      The above data has been reviewed by ARSH Tse 07/25/2023 08:30 EDT.          Patient Care Team:  Laly Chi APRN as PCP - General (Nurse Practitioner)           ASSESSMENT & PLAN    Diagnoses and all orders for this visit:    1. Annual physical exam " (Primary)  Comments:  preventative counseling  healthy diet  daily exercise  get adequate sleep  Orders:  -     Comprehensive Metabolic Panel; Future  -     CBC & Differential; Future      2. Vitamin B12 deficiency  Comments:  refilled cycobalamin supplement, labs ordered  Overview:  Poor control - begin folic acid 5mg daily    Orders:  -     Vitamin B12; Future  -     Folate; Future  -     Iron Profile; Future  -     vitamin B-12 (CYANOCOBALAMIN) 1000 MCG tablet; Take 1 tablet by mouth Daily.  Dispense: 30 tablet; Refill: 5  -     vitamin B-6 (PYRIDOXINE) 50 MG tablet; Take 1 tablet by mouth Daily.  Dispense: 30 tablet; Refill: 5      3. Vitamin D deficiency  Comments:  refilled vit d supplement, labs ordered  Overview:  Poor control take Vit D 5000 IU daily and Vit D 50,000 IU weekly    Orders:  -     Vitamin D 25 hydroxy; Future  -     Iron Profile; Future  -     vitamin D (ERGOCALCIFEROL) 1.25 MG (15071 UT) capsule capsule; Take 1 capsule by mouth 1 (One) Time Per Week.  Dispense: 13 capsule; Refill: 1      4. Screening for lipid disorders  -     Lipid Panel; Future    5. Screening for thyroid disorder  -     TSH+Free T4; Future  6. Need for second booster dose of COVID-19 vaccine  Comments:  booster given in office  Orders:  -     COVID-19 (Pfizer) Bivalent 12+yrs      7. Allergic rhinitis, unspecified seasonality, unspecified trigger  Comments:  refilled zyrtec  Orders:  -     cetirizine (zyrTEC) 10 MG tablet; Take 1 tablet by mouth Daily.  Dispense: 30 tablet; Refill: 5  -     montelukast (SINGULAIR) 10 MG tablet; Take 1 tablet by mouth Every Night.  Dispense: 30 tablet; Refill: 5    -     Ambulatory Referral to Allergy    8. Folic acid deficiency  -     Folic Acid 5 MG capsule; Take 5 mg by mouth Daily.  Dispense: 30 each; Refill: 5      9. Gastroparesis  Comments:  refilled zofran, linzess, prilosec, carafate  Orders:  -     omeprazole (priLOSEC) 40 MG capsule; Take 1 capsule by mouth Daily. Patient has  been taking PPI  Dispense: 30 capsule; Refill: 5  -     ondansetron ODT (ZOFRAN-ODT) 8 MG disintegrating tablet; Place 1 tablet on the tongue Every 8 (Eight) Hours As Needed for Nausea or Vomiting.  Dispense: 30 tablet; Refill: 2  -     sucralfate (Carafate) 1 g tablet; Take 1 tablet by mouth 4 (Four) Times a Day.  Dispense: 120 tablet; Refill: 5      10. Constipation due to outlet dysfunction  Comments:  linzess refilled  Orders:  -     linaclotide (LINZESS) 290 MCG capsule capsule; Take 1 capsule by mouth Every Morning Before Breakfast.  Dispense: 30 capsule; Refill: 11    11. Allergic conjunctivitis of both eyes  Comments:  zatador refilled  Orders:  -     ketotifen (ZADITOR) 0.025 % ophthalmic solution; Apply 1 drop to eye(s) as directed by provider 2 (Two) Times a Day.  Dispense: 10 mL; Refill: 2  12. Mild persistent asthma, unspecified whether complicated  Comments:  start symbicort inahler, nebulizer solution rtefilled  Orders:  -     albuterol (ACCUNEB) 0.63 MG/3ML nebulizer solution; Take 3 mL by nebulization Every 4 (Four) Hours As Needed for Wheezing or Shortness of Air.  Dispense: 12 mL; Refill: 5  -     budesonide-formoterol (Symbicort) 80-4.5 MCG/ACT inhaler; Inhale 2 puffs 2 (Two) Times a Day.  Dispense: 10.2 g; Refill: 12    -     Ambulatory Referral to Pulmonology    13. Nut allergy  -     Ambulatory Referral to Allergy    14. Numbness and tingling of both lower extremities  Comments:  emg order placed  Orders:  -     EMG & Nerve Conduction Test; Future    15. Numbness and tingling of hand  Comments:  emg order placed, discussed potential of Raynauds  Orders:  -     EMG & Nerve Conduction Test; Future    16. Underweight  Comments:  nutrition counseling provided, encouraged protein shakes         Tobacco Use: Low Risk     Smoking Tobacco Use: Never    Smokeless Tobacco Use: Never    Passive Exposure: Not on file       Follow Up     Return in about 6 months (around 1/25/2024).      Patient was given  instructions and counseling regarding his condition or for health maintenance advice. Please see specific information pulled into the AVS if appropriate.   I have reviewed information obtained and documented by others and I have confirmed the accuracy of this documented note.    ARSH Tse

## 2023-08-01 DIAGNOSIS — J45.30 MILD PERSISTENT ASTHMA, UNSPECIFIED WHETHER COMPLICATED: ICD-10-CM

## 2023-08-01 RX ORDER — ALBUTEROL SULFATE 1.25 MG/3ML
1 SOLUTION RESPIRATORY (INHALATION) EVERY 6 HOURS PRN
Qty: 30 ML | Refills: 12 | Status: SHIPPED | OUTPATIENT
Start: 2023-08-01

## 2023-09-25 ENCOUNTER — TELEPHONE (OUTPATIENT)
Dept: FAMILY MEDICINE CLINIC | Facility: CLINIC | Age: 28
End: 2023-09-25

## 2023-09-25 NOTE — TELEPHONE ENCOUNTER
Caller: LÁZARO- MOTHER    Relationship: Parent    Best call back number: [phone]    What was the call regarding: MOTHER IS CHECKING ON NERVE FUNCTION TEST THAT WAS DISCUSSED DURING 07/25/2023 APPOINTMENT AS SHE NOR PATIENT HAS HEARD ANYTHING ABOUT THIS BEING SCHEDULED

## 2023-10-02 ENCOUNTER — TELEPHONE (OUTPATIENT)
Dept: PULMONOLOGY | Facility: CLINIC | Age: 28
End: 2023-10-02

## 2023-10-02 ENCOUNTER — TELEPHONE (OUTPATIENT)
Dept: NEUROLOGY | Facility: CLINIC | Age: 28
End: 2023-10-02

## 2023-10-02 NOTE — TELEPHONE ENCOUNTER
Caller: LÁZARO    Relationship to patient: Mother    Best call back number: [phone]    Patient is needing: MOTHER (POA) CALLED TO SCHEDULE NEW PT APPT. PT SCHEDULED TO SEE DR. MARES ON 12/20 AT 8;30 AM . MOM STATES PT LUNGS ARE REALLY BAD AND HE NEEDS AN APPT BEFORE 12/20. AND THEY NEVER RECEIVED A LETTER IN THE MAIL CONFIRMED ADDRESS ON FILE WHICH IS CORRECT.

## 2023-10-02 NOTE — TELEPHONE ENCOUNTER
Please see if this Thursday Oct. 5 at 2:00pm will work.  I have scheduled patient in that time slot.  If that does not work please let me know. Thanks

## 2023-10-05 ENCOUNTER — HOSPITAL ENCOUNTER (OUTPATIENT)
Dept: GENERAL RADIOLOGY | Facility: HOSPITAL | Age: 28
Discharge: HOME | End: 2023-10-05
Admitting: INTERNAL MEDICINE
Payer: COMMERCIAL

## 2023-10-05 ENCOUNTER — TELEPHONE (OUTPATIENT)
Dept: PULMONOLOGY | Facility: CLINIC | Age: 28
End: 2023-10-05

## 2023-10-05 ENCOUNTER — OFFICE VISIT (OUTPATIENT)
Dept: PULMONOLOGY | Facility: CLINIC | Age: 28
End: 2023-10-05
Payer: COMMERCIAL

## 2023-10-05 VITALS
OXYGEN SATURATION: 98 % | SYSTOLIC BLOOD PRESSURE: 115 MMHG | BODY MASS INDEX: 16.14 KG/M2 | WEIGHT: 129.8 LBS | DIASTOLIC BLOOD PRESSURE: 70 MMHG | TEMPERATURE: 98.7 F | HEART RATE: 93 BPM | RESPIRATION RATE: 18 BRPM | HEIGHT: 75 IN

## 2023-10-05 DIAGNOSIS — J45.909 MODERATE ASTHMA, UNSPECIFIED WHETHER COMPLICATED, UNSPECIFIED WHETHER PERSISTENT: Primary | ICD-10-CM

## 2023-10-05 DIAGNOSIS — J30.9 ALLERGIC RHINITIS, UNSPECIFIED SEASONALITY, UNSPECIFIED TRIGGER: ICD-10-CM

## 2023-10-05 DIAGNOSIS — J45.30 MILD PERSISTENT ASTHMA, UNSPECIFIED WHETHER COMPLICATED: ICD-10-CM

## 2023-10-05 DIAGNOSIS — J45.909 MODERATE ASTHMA, UNSPECIFIED WHETHER COMPLICATED, UNSPECIFIED WHETHER PERSISTENT: ICD-10-CM

## 2023-10-05 PROCEDURE — 71046 X-RAY EXAM CHEST 2 VIEWS: CPT

## 2023-10-05 PROCEDURE — 99203 OFFICE O/P NEW LOW 30 MIN: CPT | Performed by: INTERNAL MEDICINE

## 2023-10-05 PROCEDURE — 1160F RVW MEDS BY RX/DR IN RCRD: CPT | Performed by: INTERNAL MEDICINE

## 2023-10-05 PROCEDURE — 1159F MED LIST DOCD IN RCRD: CPT | Performed by: INTERNAL MEDICINE

## 2023-10-05 RX ORDER — ALBUTEROL SULFATE 90 UG/1
2 AEROSOL, METERED RESPIRATORY (INHALATION)
COMMUNITY
Start: 2023-08-23 | End: 2023-10-05 | Stop reason: SDUPTHER

## 2023-10-05 RX ORDER — CETIRIZINE HYDROCHLORIDE 10 MG/1
TABLET ORAL
Qty: 30 TABLET | Refills: 0 | Status: SHIPPED | OUTPATIENT
Start: 2023-10-05

## 2023-10-05 RX ORDER — INHALER,ASSIST DEV,SMALL MASK
SPACER (EA) MISCELLANEOUS
COMMUNITY
Start: 2023-08-23

## 2023-10-05 RX ORDER — MOMETASONE FUROATE AND FORMOTEROL FUMARATE DIHYDRATE 50; 5 UG/1; UG/1
2 AEROSOL RESPIRATORY (INHALATION)
Qty: 1 EACH | Refills: 11 | Status: SHIPPED | OUTPATIENT
Start: 2023-10-05

## 2023-10-05 RX ORDER — FEXOFENADINE HCL 180 MG/1
1 TABLET ORAL DAILY
COMMUNITY
Start: 2023-08-24

## 2023-10-05 RX ORDER — ALBUTEROL SULFATE 90 UG/1
2 AEROSOL, METERED RESPIRATORY (INHALATION) EVERY 6 HOURS PRN
Qty: 1 G | Refills: 3 | Status: SHIPPED | OUTPATIENT
Start: 2023-10-05

## 2023-10-05 RX ORDER — MONTELUKAST SODIUM 10 MG/1
TABLET ORAL
Qty: 90 TABLET | Refills: 0 | Status: SHIPPED | OUTPATIENT
Start: 2023-10-05

## 2023-10-05 NOTE — TELEPHONE ENCOUNTER
Tried to contact patient no answer left voicemail to return call.     Need to speak with patient in regards to inhaler  sent in. Per  insurance would not cover symbicort so he sent in Dulera instead.

## 2023-10-05 NOTE — PROGRESS NOTES
Pulmonary Consultation    Laly Chi APRN,    Thank you for asking me to see Giselle Hathaway for   Chief Complaint   Patient presents with    hospitals Care     New Patient    Asthma    Cough    Wheezing    Shortness of Breath   .      History of Present Illness  Giselle Hathaway is a 28 y.o. male with a PMH significant for bronchial asthma presents for evaluation patient takes albuterol inhaler only occasionally patient has been having some dry cough along with wheezing and shortness of breath off-and-on he denies any chest pain fever or hemoptysis      Tobacco use history:  Never smoker      Review of Systems: History obtained from chart review and the patient.  Review of Systems   Respiratory:  Positive for cough and shortness of breath.    All other systems reviewed and are negative.  As described in the HPI. Otherwise, remainder of ROS (14 systems) were negative.    Patient Active Problem List   Diagnosis    Allergic rhinitis    Asthma    Chronic bronchitis    Depression    Disorder of pelvis    Educational circumstance    Esophagitis, eosinophilic    Fecal incontinence alternating with constipation    Gastroparesis    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    Mental impairment    Speech disturbance    Vitamin B12 deficiency    Vitamin D deficiency    Constipation due to outlet dysfunction         Current Outpatient Medications:     acetaminophen (TYLENOL) 325 MG tablet, Tylenol 325 mg oral tablet take 1 tablet (325 mg) by oral route every 4 hours as needed   Suspended, Disp: , Rfl:     albuterol (ACCUNEB) 1.25 MG/3ML nebulizer solution, Take 3 mL by nebulization Every 6 (Six) Hours As Needed for Wheezing or Shortness of Air., Disp: 30 mL, Rfl: 12    albuterol sulfate  (90 Base) MCG/ACT inhaler, Inhale 2 puffs Every 6 (Six) Hours As Needed for Wheezing., Disp: 1 g, Rfl: 3    cetirizine (zyrTEC) 10 MG tablet, Take 1 tablet by mouth Daily., Disp: 30 tablet, Rfl: 5    Folic  Acid 5 MG capsule, Take 5 mg by mouth Daily., Disp: 30 each, Rfl: 5    ketotifen (ZADITOR) 0.025 % ophthalmic solution, Apply 1 drop to eye(s) as directed by provider 2 (Two) Times a Day., Disp: 10 mL, Rfl: 2    linaclotide (LINZESS) 290 MCG capsule capsule, Take 1 capsule by mouth Every Morning Before Breakfast., Disp: 30 capsule, Rfl: 11    mometasone-formoterol (Dulera) 50-5 MCG/ACT inhaler, Inhale 2 puffs 2 (Two) Times a Day., Disp: 1 each, Rfl: 11    montelukast (SINGULAIR) 10 MG tablet, Take 1 tablet by mouth Every Night., Disp: 30 tablet, Rfl: 5    omeprazole (priLOSEC) 40 MG capsule, Take 1 capsule by mouth Daily. Patient has been taking PPI, Disp: 30 capsule, Rfl: 5    ondansetron ODT (ZOFRAN-ODT) 8 MG disintegrating tablet, Place 1 tablet on the tongue Every 8 (Eight) Hours As Needed for Nausea or Vomiting., Disp: 30 tablet, Rfl: 2    Spacer/Aero-Holding Chambers (EQ Space Chamber Anti-Static) device, USE AS DIRECTED WITH HAND HELD INHALER, Disp: , Rfl:     sucralfate (Carafate) 1 g tablet, Take 1 tablet by mouth 4 (Four) Times a Day., Disp: 120 tablet, Rfl: 5    vitamin B-12 (CYANOCOBALAMIN) 1000 MCG tablet, Take 1 tablet by mouth Daily., Disp: 30 tablet, Rfl: 5    vitamin B-6 (PYRIDOXINE) 50 MG tablet, Take 1 tablet by mouth Daily., Disp: 30 tablet, Rfl: 5    vitamin D (ERGOCALCIFEROL) 1.25 MG (36001 UT) capsule capsule, Take 1 capsule by mouth 1 (One) Time Per Week., Disp: 13 capsule, Rfl: 1    EPINEPHrine (EPIPEN) 0.3 MG/0.3ML solution auto-injector injection, 1 (One) Time. (Patient not taking: Reported on 10/5/2023), Disp: , Rfl:     fexofenadine (ALLEGRA) 180 MG tablet, Take 1 tablet by mouth Daily. (Patient not taking: Reported on 10/5/2023), Disp: , Rfl:     mometasone (Nasonex) 50 MCG/ACT nasal spray, 2 sprays into the nostril(s) as directed by provider Daily. (Patient not taking: Reported on 10/5/2023), Disp: 17 g, Rfl: 5    Allergies   Allergen Reactions    Nuts Shortness Of Breath     "Amoxicillin Itching    Eggs Or Egg-Derived Products Itching    Penicillins Other (See Comments)     Rash and shortness of breath  Allergic to any of penicillin family    Ventolin [Albuterol] Other (See Comments)     States he can not have ventolin but can take name brand albuterol   Unknown reaction    Hydrocortisone Rash    Ibuprofen Rash    Soybean Oil Rash    Wheat Rash       Past Medical History:   Diagnosis Date    Allergic rhinitis     Anxiety     Asthma 07/28/2015    Chronic bronchitis     Depression     Esophagitis, eosinophilic 08/18/2016    Fecal incontinence alternating with constipation 08/18/2016    Gastroparesis 07/28/2015    Jaundice     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 08/18/2016    Mental impairment 08/18/2016    Panic attacks     Pelvic floor dysfunction 07/28/2015    Special educational needs 03/14/2017    Speech impediment 08/18/2016    Vitamin B12 deficiency 07/18/2016    Vitamin D deficiency 08/18/2016    Weight loss      Past Surgical History:   Procedure Laterality Date    CHOLECYSTECTOMY       Social History     Socioeconomic History    Marital status: Single   Tobacco Use    Smoking status: Never    Smokeless tobacco: Never   Vaping Use    Vaping Use: Never used   Substance and Sexual Activity    Alcohol use: Never    Drug use: Never    Sexual activity: Defer     Family History   Family history unknown: Yes       No radiology results for the last 90 days.        Objective     Blood pressure 115/70, pulse 93, temperature 98.7 °F (37.1 °C), temperature source Tympanic, resp. rate 18, height 190.5 cm (75\"), weight 58.9 kg (129 lb 12.8 oz), SpO2 98 %.  Physical Exam  Vitals and nursing note reviewed.   Constitutional:       Appearance: Normal appearance.   HENT:      Head: Normocephalic and atraumatic.      Nose: Nose normal.      Mouth/Throat:      Mouth: Mucous membranes are moist.      Pharynx: Oropharynx is clear.   Eyes:      Extraocular Movements: Extraocular movements " intact.      Conjunctiva/sclera: Conjunctivae normal.      Pupils: Pupils are equal, round, and reactive to light.   Cardiovascular:      Rate and Rhythm: Normal rate and regular rhythm.      Pulses: Normal pulses.      Heart sounds: Normal heart sounds.   Pulmonary:      Effort: Pulmonary effort is normal.      Breath sounds: Normal breath sounds.   Abdominal:      General: Abdomen is flat. Bowel sounds are normal.      Palpations: Abdomen is soft.   Musculoskeletal:         General: Normal range of motion.      Cervical back: Normal range of motion and neck supple.   Skin:     General: Skin is warm.      Capillary Refill: Capillary refill takes 2 to 3 seconds.   Neurological:      General: No focal deficit present.      Mental Status: He is alert and oriented to person, place, and time.   Psychiatric:         Mood and Affect: Mood normal.         Behavior: Behavior normal.     Immunization History   Administered Date(s) Administered    COVID-19 (PFIZER) BIVALENT 12+YRS 07/25/2023    COVID-19 (PFIZER) Purple Cap Monovalent 03/18/2021, 04/12/2021, 11/16/2021    DTP / HiB 04/12/1996    Flu Vaccine Intradermal Quad 18-64YR 11/12/2021    Fluzone (or Fluarix & Flulaval for VFC) >6mos 10/11/2022    Fluzone Quad >6mos (Multi-dose) 12/05/2019    Hep B, Adolescent or Pediatric 04/12/1996    Influenza Injectable Mdck Pf Quad 11/12/2021, 10/03/2023    MCV4 Unspecified 10/06/2008    MMR 10/14/1996    Meningococcal Conjugate 10/06/2008    OPV 04/12/1996    Tdap 07/01/2021    Varicella 10/06/2008            Assessment & Plan     Diagnoses and all orders for this visit:    1. Moderate asthma, unspecified whether complicated, unspecified whether persistent (Primary)  -     Complete PFT - Pre & Post Bronchodilator; Future  -     XR Chest 2 View; Future    2. Mild persistent asthma, unspecified whether complicated  Comments:  start symbicort inahler, nebulizer solution rtefilled  Orders:  -     mometasone-formoterol (Dulera) 50-5  MCG/ACT inhaler; Inhale 2 puffs 2 (Two) Times a Day.  Dispense: 1 each; Refill: 11    Other orders  -     albuterol sulfate  (90 Base) MCG/ACT inhaler; Inhale 2 puffs Every 6 (Six) Hours As Needed for Wheezing.  Dispense: 1 g; Refill: 3         Discussion/ Recommendations:   Will order PFTs and chest x-ray for evaluation  Patient is advised albuterol inhaler every 6 hours as needed  Dulera twice daily  Discussed vaccination and recommended    BMI is below normal parameters (malnutrition). Recommendations: referral to primary care           Return in about 3 months (around 1/5/2024).      Thank you for allowing me to participate in the care of Giselle Hathaway. Please do not hesitate to contact me with any questions.         This document has been electronically signed by Cas Hernandez MD on October 5, 2023 13:45 EDT

## 2023-10-11 ENCOUNTER — TELEPHONE (OUTPATIENT)
Dept: PULMONOLOGY | Facility: CLINIC | Age: 28
End: 2023-10-11
Payer: COMMERCIAL

## 2023-10-11 NOTE — TELEPHONE ENCOUNTER
Spoke to patient's mother, informed them that you were out of the office until Monday. But, they wanted an answer from you on this matter. Please advise, thanks.

## 2023-10-11 NOTE — TELEPHONE ENCOUNTER
Patients Mother came in and is asking if the patient should take COVID booster.   She is not sure which route to take. She is is wanting to know if he could get it at her PCP

## 2023-10-27 ENCOUNTER — OFFICE VISIT (OUTPATIENT)
Dept: FAMILY MEDICINE CLINIC | Facility: CLINIC | Age: 28
End: 2023-10-27
Payer: COMMERCIAL

## 2023-10-27 VITALS
HEART RATE: 88 BPM | HEIGHT: 75 IN | WEIGHT: 137 LBS | BODY MASS INDEX: 17.03 KG/M2 | SYSTOLIC BLOOD PRESSURE: 112 MMHG | OXYGEN SATURATION: 99 % | DIASTOLIC BLOOD PRESSURE: 61 MMHG

## 2023-10-27 DIAGNOSIS — R23.8 SKIN IRRITATION: Primary | ICD-10-CM

## 2023-10-27 DIAGNOSIS — J30.9 ALLERGIC RHINITIS, UNSPECIFIED SEASONALITY, UNSPECIFIED TRIGGER: ICD-10-CM

## 2023-10-27 DIAGNOSIS — R35.0 URINE FREQUENCY: ICD-10-CM

## 2023-10-27 DIAGNOSIS — H10.13 ALLERGIC CONJUNCTIVITIS OF BOTH EYES: ICD-10-CM

## 2023-10-27 DIAGNOSIS — K31.84 GASTROPARESIS: ICD-10-CM

## 2023-10-27 DIAGNOSIS — R30.0 BURNING WITH URINATION: ICD-10-CM

## 2023-10-27 LAB
BILIRUB BLD-MCNC: NEGATIVE MG/DL
CLARITY, POC: CLEAR
COLOR UR: YELLOW
EXPIRATION DATE: NORMAL
GLUCOSE UR STRIP-MCNC: NEGATIVE MG/DL
KETONES UR QL: NEGATIVE
LEUKOCYTE EST, POC: NEGATIVE
Lab: NORMAL
NITRITE UR-MCNC: NEGATIVE MG/ML
PH UR: 7 [PH] (ref 5–8)
PROT UR STRIP-MCNC: NEGATIVE MG/DL
RBC # UR STRIP: NEGATIVE /UL
SP GR UR: 1.01 (ref 1–1.03)
UROBILINOGEN UR QL: NORMAL

## 2023-10-27 RX ORDER — ONDANSETRON 8 MG/1
8 TABLET, ORALLY DISINTEGRATING ORAL EVERY 8 HOURS PRN
Qty: 30 TABLET | Refills: 2 | Status: SHIPPED | OUTPATIENT
Start: 2023-10-27

## 2023-10-27 RX ORDER — ZINC OXIDE 13 %
1 CREAM (GRAM) TOPICAL
Qty: 57 G | Refills: 0 | Status: SHIPPED | OUTPATIENT
Start: 2023-10-27

## 2023-10-27 RX ORDER — CETIRIZINE HYDROCHLORIDE 10 MG/1
10 TABLET ORAL DAILY
Qty: 90 TABLET | Refills: 1 | Status: SHIPPED | OUTPATIENT
Start: 2023-10-27

## 2023-10-27 RX ORDER — KETOTIFEN FUMARATE 0.35 MG/ML
1 SOLUTION/ DROPS OPHTHALMIC 2 TIMES DAILY
Qty: 10 ML | Refills: 0 | Status: SHIPPED | OUTPATIENT
Start: 2023-10-27

## 2023-10-27 NOTE — PROGRESS NOTES
Chief Complaint  Rash on penis, burning with urination    SUBJECTIVE  Corentez Woodrow Hathaway presents to [facility]     History of Present Illness  Patient is a 28-year-old male who presents today with complaints of dysuria and rash on his penis.  Patient reports he recently changed body wash.  Patient reports burning.  Patient reports he did have some peeling on the skin of his penis.    Past Medical History:   Diagnosis Date    Allergic rhinitis     Anxiety     Asthma 07/28/2015    Chronic bronchitis     Depression     Esophagitis, eosinophilic 08/18/2016    Fecal incontinence alternating with constipation 08/18/2016    Gastroparesis 07/28/2015    Jaundice     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 08/18/2016    Mental impairment 08/18/2016    Panic attacks     Pelvic floor dysfunction 07/28/2015    Special educational needs 03/14/2017    Speech impediment 08/18/2016    Vitamin B12 deficiency 07/18/2016    Vitamin D deficiency 08/18/2016    Weight loss       Family History   Family history unknown: Yes      Past Surgical History:   Procedure Laterality Date    CHOLECYSTECTOMY          Current Outpatient Medications:     acetaminophen (TYLENOL) 325 MG tablet, Tylenol 325 mg oral tablet take 1 tablet (325 mg) by oral route every 4 hours as needed   Suspended, Disp: , Rfl:     albuterol (ACCUNEB) 1.25 MG/3ML nebulizer solution, Take 3 mL by nebulization Every 6 (Six) Hours As Needed for Wheezing or Shortness of Air., Disp: 30 mL, Rfl: 12    albuterol sulfate  (90 Base) MCG/ACT inhaler, Inhale 2 puffs Every 6 (Six) Hours As Needed for Wheezing., Disp: 1 g, Rfl: 3    cetirizine (zyrTEC) 10 MG tablet, Take 1 tablet by mouth Daily., Disp: 90 tablet, Rfl: 1    EPINEPHrine (EPIPEN) 0.3 MG/0.3ML solution auto-injector injection, 1 (One) Time., Disp: , Rfl:     fexofenadine (ALLEGRA) 180 MG tablet, Take 1 tablet by mouth Daily., Disp: , Rfl:     Folic Acid 5 MG capsule, Take  "5 mg by mouth Daily., Disp: 30 each, Rfl: 5    Ketotifen Fumarate (ZADITOR) 0.035 % solution, Administer 1 drop to both eyes 2 (Two) Times a Day., Disp: 10 mL, Rfl: 0    linaclotide (LINZESS) 290 MCG capsule capsule, Take 1 capsule by mouth Every Morning Before Breakfast., Disp: 30 capsule, Rfl: 11    montelukast (SINGULAIR) 10 MG tablet, TAKE 1 TABLET BY MOUTH ONCE DAILY AT NIGHT, Disp: 90 tablet, Rfl: 0    omeprazole (priLOSEC) 40 MG capsule, Take 1 capsule by mouth Daily. Patient has been taking PPI, Disp: 30 capsule, Rfl: 5    ondansetron ODT (ZOFRAN-ODT) 8 MG disintegrating tablet, Place 1 tablet on the tongue Every 8 (Eight) Hours As Needed for Nausea or Vomiting., Disp: 30 tablet, Rfl: 2    Spacer/Aero-Holding Chambers (EQ Space Chamber Anti-Static) device, USE AS DIRECTED WITH HAND HELD INHALER, Disp: , Rfl:     sucralfate (Carafate) 1 g tablet, Take 1 tablet by mouth 4 (Four) Times a Day., Disp: 120 tablet, Rfl: 5    vitamin B-12 (CYANOCOBALAMIN) 1000 MCG tablet, Take 1 tablet by mouth Daily., Disp: 30 tablet, Rfl: 5    vitamin B-6 (PYRIDOXINE) 50 MG tablet, Take 1 tablet by mouth Daily., Disp: 30 tablet, Rfl: 5    vitamin D (ERGOCALCIFEROL) 1.25 MG (92818 UT) capsule capsule, Take 1 capsule by mouth 1 (One) Time Per Week., Disp: 13 capsule, Rfl: 1    mometasone-formoterol (Dulera) 50-5 MCG/ACT inhaler, Inhale 2 puffs 2 (Two) Times a Day. (Patient not taking: Reported on 10/27/2023), Disp: 1 each, Rfl: 11    zinc oxide (Desitin Daily Defense) 13 % cream cream, Apply 1 application  topically to the appropriate area as directed Every 1 (One) Hour As Needed (skin irritation)., Disp: 57 g, Rfl: 0    OBJECTIVE  Vital Signs:   /61   Pulse 88   Ht 190.5 cm (75\")   Wt 62.1 kg (137 lb)   SpO2 99%   BMI 17.12 kg/m²    Estimated body mass index is 17.12 kg/m² as calculated from the following:    Height as of this encounter: 190.5 cm (75\").    Weight as of this encounter: 62.1 kg (137 lb).     Wt Readings " from Last 3 Encounters:   10/27/23 62.1 kg (137 lb)   10/05/23 58.9 kg (129 lb 12.8 oz)   07/25/23 56.3 kg (124 lb 3.2 oz)     BP Readings from Last 3 Encounters:   10/27/23 112/61   10/05/23 115/70   07/25/23 117/74       Physical Exam  Vitals reviewed. Exam conducted with a chaperone present.   Constitutional:       General: He is not in acute distress.     Appearance: Normal appearance. He is not ill-appearing.   HENT:      Head: Normocephalic and atraumatic.   Eyes:      Conjunctiva/sclera: Conjunctivae normal.   Cardiovascular:      Rate and Rhythm: Normal rate and regular rhythm.      Heart sounds: Normal heart sounds.   Pulmonary:      Effort: Pulmonary effort is normal.   Genitourinary:         Comments: Mild erythema to glans penis  Neurological:      General: No focal deficit present.      Mental Status: He is alert and oriented to person, place, and time.   Psychiatric:         Mood and Affect: Mood normal.         Behavior: Behavior normal.         Thought Content: Thought content normal.         Judgment: Judgment normal.          Result Review    Common labs          7/25/2023    09:59   Common Labs   Glucose 104    BUN 7    Creatinine 0.97    Sodium 143    Potassium 3.7    Chloride 106    Calcium 9.5    Albumin 4.4    Total Bilirubin 1.2    Alkaline Phosphatase 81    AST (SGOT) 16    ALT (SGPT) 12    WBC 6.92    Hemoglobin 13.1    Hematocrit 38.9    Platelets 274    Total Cholesterol 153    Triglycerides 52    HDL Cholesterol 61    LDL Cholesterol  81        XR Chest 2 View    Result Date: 10/5/2023    1. Hyperexpansion of the lungs without focal consolidation     KATTY MORGAN MD       Electronically Signed and Approved By: KATTY MORGAN MD on 10/05/2023 at 14:42                The above data has been reviewed by ARSH Tse 10/27/2023 10:50 EDT.          Patient Care Team:  Laly Chi APRN as PCP - General (Nurse Practitioner)            ASSESSMENT & PLAN    Diagnoses and all  orders for this visit:    1. Skin irritation (Primary)  Comments:  apply desitin to penis, stop new soipa, use sensitive skin/hypoallergenic soap  Orders:  -     zinc oxide (Desitin Daily Defense) 13 % cream cream; Apply 1 application  topically to the appropriate area as directed Every 1 (One) Hour As Needed (skin irritation).  Dispense: 57 g; Refill: 0    2. Urine frequency  Comments:  UA normal  Orders:  -     POCT urinalysis dipstick, automated    3. Burning with urination  Comments:  UA normal  Orders:  -     POCT urinalysis dipstick, automated    4. Allergic rhinitis, unspecified seasonality, unspecified trigger  Comments:  refilled zyrtec  Orders:  -     cetirizine (zyrTEC) 10 MG tablet; Take 1 tablet by mouth Daily.  Dispense: 90 tablet; Refill: 1    5. Allergic conjunctivitis of both eyes  Comments:  zatador refilled  Orders:  -     Ketotifen Fumarate (ZADITOR) 0.035 % solution; Administer 1 drop to both eyes 2 (Two) Times a Day.  Dispense: 10 mL; Refill: 0    6. Gastroparesis  Comments:  refilled zofran  Orders:  -     ondansetron ODT (ZOFRAN-ODT) 8 MG disintegrating tablet; Place 1 tablet on the tongue Every 8 (Eight) Hours As Needed for Nausea or Vomiting.  Dispense: 30 tablet; Refill: 2         Tobacco Use: Low Risk  (10/27/2023)    Patient History     Smoking Tobacco Use: Never     Smokeless Tobacco Use: Never     Passive Exposure: Not on file       Follow Up     Return if symptoms worsen or fail to improve.        Patient was given instructions and counseling regarding his condition or for health maintenance advice. Please see specific information pulled into the AVS if appropriate.   I have reviewed information obtained and documented by others and I have confirmed the accuracy of this documented note.    ARSH Tse

## 2023-10-30 ENCOUNTER — HOSPITAL ENCOUNTER (OUTPATIENT)
Dept: RESPIRATORY THERAPY | Facility: HOSPITAL | Age: 28
Discharge: HOME | End: 2023-10-30
Admitting: INTERNAL MEDICINE
Payer: COMMERCIAL

## 2023-10-30 DIAGNOSIS — J45.909 MODERATE ASTHMA, UNSPECIFIED WHETHER COMPLICATED, UNSPECIFIED WHETHER PERSISTENT: ICD-10-CM

## 2023-10-30 PROCEDURE — 94726 PLETHYSMOGRAPHY LUNG VOLUMES: CPT

## 2023-10-30 PROCEDURE — 94010 BREATHING CAPACITY TEST: CPT

## 2023-10-30 PROCEDURE — 94729 DIFFUSING CAPACITY: CPT

## 2023-10-30 RX ORDER — ALBUTEROL SULFATE 2.5 MG/3ML
2.5 SOLUTION RESPIRATORY (INHALATION) ONCE
Status: COMPLETED | OUTPATIENT
Start: 2023-10-30 | End: 2023-10-30

## 2023-10-30 RX ADMIN — ALBUTEROL SULFATE 2.5 MG: 2.5 SOLUTION RESPIRATORY (INHALATION) at 09:42

## 2023-12-06 ENCOUNTER — TELEPHONE (OUTPATIENT)
Dept: FAMILY MEDICINE CLINIC | Facility: CLINIC | Age: 28
End: 2023-12-06
Payer: COMMERCIAL

## 2023-12-06 NOTE — TELEPHONE ENCOUNTER
Spoke pt's mother and he was seen at  12/2/23 and dx w Flu A. He was sent home w a rx:    ipratropium-albuterol (DUO-NEB) nebulizer solution 3 mL     methylPREDNISolone (MEDROL) 4 MG dose pack       Sig: Take as directed on package instructions.       Dispense:  21 tablet       Pt's mother reports that pt is still having symptoms, but has not started him on the medrol pack because the pharmacy told her that he had had a reaction to it in the past.  She was wanting to know if she should start him on it and give him benadryl as well or have on hand. She did not say what type of reaction that he had, but that she thinks it occurred in 2013. I advised her to wait until his physician was notified and we would give them a call back with the response. I also advised pt's mother that if he gets worse to take him to the emergency room.

## 2023-12-28 ENCOUNTER — PROCEDURE VISIT (OUTPATIENT)
Dept: NEUROLOGY | Facility: CLINIC | Age: 28
End: 2023-12-28
Payer: COMMERCIAL

## 2023-12-28 VITALS
WEIGHT: 136 LBS | DIASTOLIC BLOOD PRESSURE: 75 MMHG | BODY MASS INDEX: 16.91 KG/M2 | HEART RATE: 96 BPM | SYSTOLIC BLOOD PRESSURE: 121 MMHG | HEIGHT: 75 IN

## 2023-12-28 DIAGNOSIS — R20.2 NUMBNESS AND TINGLING OF HAND: ICD-10-CM

## 2023-12-28 DIAGNOSIS — R20.2 NUMBNESS AND TINGLING OF BOTH LOWER EXTREMITIES: ICD-10-CM

## 2023-12-28 DIAGNOSIS — G89.29 OTHER CHRONIC PAIN: Primary | ICD-10-CM

## 2023-12-28 DIAGNOSIS — R20.0 NUMBNESS AND TINGLING OF HAND: ICD-10-CM

## 2023-12-28 DIAGNOSIS — R20.0 NUMBNESS AND TINGLING OF BOTH LOWER EXTREMITIES: ICD-10-CM

## 2023-12-28 NOTE — PROGRESS NOTES
"Chief Complaint  Nerve Study (BLE) and Numbness (BLE)    Subjective          Jacquientez Woodrow Hathaway is a 28 y.o. male who presents to [facility]  History of Present Illness  28-year-old man here for nerve conduction EMG study.  He states that he had pain in his arms, legs for the past 5 years or longer.  It happens when he is sitting and standing and walking.  It is different parts of his arms and legs.  Is here for nerve conduction EMG study.  He has no weakness.   Objective   Vital Signs:   /75   Pulse 96   Ht 190.5 cm (75\")   Wt 61.7 kg (136 lb)   BMI 17.00 kg/m²     Physical Exam   There is no weakness of the upper or lower extremities on individual muscle testing testing.  Reflexes are decreased in the biceps, triceps, patellar's and absent in the ankles.  Sensation symmetrical to pinprick.  He is able to tiptoe, heel walk, radha without difficulty.  He is able to get up from a sitting to standing position with arms crossed without any difficulty.        Assessment and Plan  Diagnoses and all orders for this visit:    1. Other chronic pain (Primary)  Assessment & Plan:  EMG study and nerve conduction study of the right upper extremity is normal.  EMG study nerve conduction study of the of the right lower extremity is normal.  Nerve conduction study of the left lower extremity is normal.  Thank you for letting me participate in his care      2. Numbness and tingling of both lower extremities  Comments:  emg order placed  Orders:  -     EMG & Nerve Conduction Test    3. Numbness and tingling of hand  Comments:  emg order placed, discussed potential of Raynauds  Orders:  -     EMG & Nerve Conduction Test         Nerve Conduction Study:  10 nerves     EM extremities    Total time spent with the patient and coordinating patient care was 15 minutes.    Follow Up  No follow-ups on file.  Patient was given instructions and counseling regarding his condition or " for health maintenance advice. Please see specific information pulled into the AVS if appropriate.

## 2023-12-28 NOTE — ASSESSMENT & PLAN NOTE
EMG study and nerve conduction study of the right upper extremity is normal.  EMG study nerve conduction study of the of the right lower extremity is normal.  Nerve conduction study of the left lower extremity is normal.  Thank you for letting me participate in his care

## 2024-01-11 ENCOUNTER — OFFICE VISIT (OUTPATIENT)
Dept: PULMONOLOGY | Facility: CLINIC | Age: 29
End: 2024-01-11
Payer: COMMERCIAL

## 2024-01-11 VITALS
HEART RATE: 85 BPM | WEIGHT: 141 LBS | BODY MASS INDEX: 17.53 KG/M2 | RESPIRATION RATE: 18 BRPM | TEMPERATURE: 98.2 F | SYSTOLIC BLOOD PRESSURE: 118 MMHG | DIASTOLIC BLOOD PRESSURE: 79 MMHG | HEIGHT: 75 IN | OXYGEN SATURATION: 97 %

## 2024-01-11 DIAGNOSIS — J45.909 MODERATE ASTHMA, UNSPECIFIED WHETHER COMPLICATED, UNSPECIFIED WHETHER PERSISTENT: Primary | ICD-10-CM

## 2024-01-11 PROCEDURE — 1160F RVW MEDS BY RX/DR IN RCRD: CPT | Performed by: INTERNAL MEDICINE

## 2024-01-11 PROCEDURE — 99213 OFFICE O/P EST LOW 20 MIN: CPT | Performed by: INTERNAL MEDICINE

## 2024-01-11 PROCEDURE — 1159F MED LIST DOCD IN RCRD: CPT | Performed by: INTERNAL MEDICINE

## 2024-01-11 NOTE — PROGRESS NOTES
Pulmonary Office Follow-up    Subjective     Giselle Hathaway is seen today at the office for   Chief Complaint   Patient presents with    Follow-up     3 Months         HPI  Giselle Hathaway is a 28 y.o. male with a PMH significant for asthma presents for follow-up patient has been doing well and denies any significant shortness of breath cough or wheezing he has been fairly compliant and denies any oral thrush      Tobacco use history:  Never smoker      Patient Active Problem List   Diagnosis    Allergic rhinitis    Asthma    Chronic bronchitis    Depression    Disorder of pelvis    Educational circumstance    Esophagitis, eosinophilic    Fecal incontinence alternating with constipation    Gastroparesis    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    Mental impairment    Speech disturbance    Vitamin B12 deficiency    Vitamin D deficiency    Constipation due to outlet dysfunction    Other chronic pain       Review of Systems  Review of Systems   Respiratory:  Positive for shortness of breath.    All other systems reviewed and are negative.    As described in the HPI. Otherwise, remainder of ROS (14 systems) were negative.    Medications, Allergies, Social, and Family Histories reviewed as per EMR.    Result Review :       Data reviewed : Radiologic studies chest      Objective     Vitals:    01/11/24 1018   BP: 118/79   Pulse: 85   Resp: 18   Temp: 98.2 °F (36.8 °C)   SpO2: 97%         01/11/24  1018   Weight: 64 kg (141 lb)       Physical Exam  Vitals and nursing note reviewed.   Constitutional:       Appearance: Normal appearance.   HENT:      Head: Normocephalic and atraumatic.      Nose: Nose normal.      Mouth/Throat:      Mouth: Mucous membranes are moist.      Pharynx: Oropharynx is clear.   Eyes:      Extraocular Movements: Extraocular movements intact.      Conjunctiva/sclera: Conjunctivae normal.      Pupils: Pupils are equal, round, and reactive to light.   Cardiovascular:       Rate and Rhythm: Normal rate and regular rhythm.      Pulses: Normal pulses.      Heart sounds: Normal heart sounds.   Pulmonary:      Effort: Pulmonary effort is normal.      Breath sounds: Normal breath sounds.   Abdominal:      General: Abdomen is flat. Bowel sounds are normal.      Palpations: Abdomen is soft.   Musculoskeletal:         General: Normal range of motion.      Cervical back: Normal range of motion and neck supple.   Skin:     General: Skin is warm.      Capillary Refill: Capillary refill takes 2 to 3 seconds.   Neurological:      General: No focal deficit present.      Mental Status: He is alert and oriented to person, place, and time.   Psychiatric:         Mood and Affect: Mood normal.         Behavior: Behavior normal.         No radiology results for the last 90 days.     Assessment & Plan     Diagnoses and all orders for this visit:    1. Moderate asthma, unspecified whether complicated, unspecified whether persistent (Primary)         Discussion/ Recommendations:   Patient seems to be stable and is doing well and improving on his current medications  Advised to continue his present medications  Advised to rinse his mouth after use  Continue home medications  Vaccinations discussed and recommended    BMI is below normal parameters (malnutrition). Recommendations: referral to primary care        Return in about 4 months (around 5/11/2024).          This document has been electronically signed by Cas Hernandez MD on January 11, 2024 10:28 EST

## 2024-01-14 DIAGNOSIS — E55.9 VITAMIN D DEFICIENCY: ICD-10-CM

## 2024-01-15 RX ORDER — ERGOCALCIFEROL 1.25 MG/1
50000 CAPSULE ORAL WEEKLY
Qty: 13 CAPSULE | Refills: 0 | Status: SHIPPED | OUTPATIENT
Start: 2024-01-15

## 2024-01-25 ENCOUNTER — OFFICE VISIT (OUTPATIENT)
Dept: FAMILY MEDICINE CLINIC | Facility: CLINIC | Age: 29
End: 2024-01-25
Payer: COMMERCIAL

## 2024-01-25 VITALS
OXYGEN SATURATION: 99 % | DIASTOLIC BLOOD PRESSURE: 68 MMHG | SYSTOLIC BLOOD PRESSURE: 110 MMHG | WEIGHT: 141.8 LBS | HEART RATE: 89 BPM | BODY MASS INDEX: 17.63 KG/M2 | HEIGHT: 75 IN

## 2024-01-25 DIAGNOSIS — E53.8 FOLIC ACID DEFICIENCY: Primary | ICD-10-CM

## 2024-01-25 DIAGNOSIS — R63.6 UNDERWEIGHT: ICD-10-CM

## 2024-01-25 DIAGNOSIS — K31.84 GASTROPARESIS: ICD-10-CM

## 2024-01-25 DIAGNOSIS — E55.9 VITAMIN D DEFICIENCY: ICD-10-CM

## 2024-01-25 DIAGNOSIS — I73.00 RAYNAUD'S PHENOMENON WITHOUT GANGRENE: ICD-10-CM

## 2024-01-25 DIAGNOSIS — J30.9 ALLERGIC RHINITIS, UNSPECIFIED SEASONALITY, UNSPECIFIED TRIGGER: ICD-10-CM

## 2024-01-25 DIAGNOSIS — E53.8 VITAMIN B12 DEFICIENCY: ICD-10-CM

## 2024-01-25 DIAGNOSIS — J45.909 MODERATE ASTHMA, UNSPECIFIED WHETHER COMPLICATED, UNSPECIFIED WHETHER PERSISTENT: ICD-10-CM

## 2024-01-25 RX ORDER — MONTELUKAST SODIUM 10 MG/1
10 TABLET ORAL NIGHTLY
Qty: 90 TABLET | Refills: 0 | Status: SHIPPED | OUTPATIENT
Start: 2024-01-25

## 2024-01-25 RX ORDER — LANOLIN ALCOHOL/MO/W.PET/CERES
50 CREAM (GRAM) TOPICAL DAILY
Qty: 30 TABLET | Refills: 5 | Status: SHIPPED | OUTPATIENT
Start: 2024-01-25

## 2024-01-25 RX ORDER — ERGOCALCIFEROL 1.25 MG/1
50000 CAPSULE ORAL WEEKLY
Qty: 13 CAPSULE | Refills: 0 | Status: SHIPPED | OUTPATIENT
Start: 2024-01-25

## 2024-01-25 RX ORDER — ONDANSETRON 8 MG/1
8 TABLET, ORALLY DISINTEGRATING ORAL EVERY 8 HOURS PRN
Qty: 30 TABLET | Refills: 2 | Status: SHIPPED | OUTPATIENT
Start: 2024-01-25

## 2024-01-25 RX ORDER — SUCRALFATE 1 G/1
1 TABLET ORAL 4 TIMES DAILY
Qty: 120 TABLET | Refills: 5 | Status: SHIPPED | OUTPATIENT
Start: 2024-01-25

## 2024-01-25 RX ORDER — LANOLIN ALCOHOL/MO/W.PET/CERES
1000 CREAM (GRAM) TOPICAL DAILY
Qty: 30 TABLET | Refills: 5 | Status: SHIPPED | OUTPATIENT
Start: 2024-01-25

## 2024-01-25 RX ORDER — FOLIC ACID 5 MG
5 CAPSULE ORAL DAILY
Qty: 30 EACH | Refills: 5 | Status: SHIPPED | OUTPATIENT
Start: 2024-01-25

## 2024-01-25 RX ORDER — OMEPRAZOLE 40 MG/1
40 CAPSULE, DELAYED RELEASE ORAL DAILY
Qty: 30 CAPSULE | Refills: 5 | Status: SHIPPED | OUTPATIENT
Start: 2024-01-25

## 2024-01-25 NOTE — PROGRESS NOTES
Chief Complaint  Asthma, Vitamin D Deficiency, Allergies, and GI Problem    SUBJECTIVE  Corentez Woodrow Hathaway presents to [facility]    History of Present Illness  Patient is a 28-year-old male who presents today for 6 month follow up on chronic conditions.  Patient needs chronic condition management of allergic rhinitis, asthma, gastroparesis, vitamin B12 deficiency, vitamin D deficiency, folate deficiency, underweight, constipation.  His mom accompanies him today.      GI/Gastroparesis/constipation-  Patient is currently on Carafate, Zofran, Prilosec, Linzess.  Patient reports this is working well to control symptoms.  He declines seeing a GI at this time. He feels he can manage on his own. Repots he is eating small portions and drinking plenty of water. Reports 2-3 vomiting episodes a week.  He repots the Linzess helps greatly with his constipation.     Patient is on vitamin B12, vitamin D, vitamin B6, and folate replacement for deficiencies.  Patient needs refills on any medications at this time.  Patient is due lab work to assess levels.     Underweight- Patients BMI has increased from 15 to 17.62 in the past 6 months.     Patient was seen by neurology and had EMG and nerve conduction test done that was unremarkable. Dx with Raynauds.       Allergic rhinitis- Referral to family allergy and asthma was placed at last appt. He was seen and they recommended allergy injections but he does not wish to do so.      Asthma- Patient is established with Pulmonology  who manages this condition and his inhalers. He feels stable on current treatment regimen.      Patient is due labs. Orders placed at today's visit. Discussed with patient that these are fasting labs.      No other concerns or complaints at this time.       Past Medical History:   Diagnosis Date    Allergic rhinitis     Anxiety     Asthma 07/28/2015    Chronic bronchitis     Depression     Esophagitis, eosinophilic  08/18/2016    Fecal incontinence alternating with constipation 08/18/2016    Gastroparesis 07/28/2015    Jaundice     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 08/18/2016    Mental impairment 08/18/2016    Panic attacks     Pelvic floor dysfunction 07/28/2015    Special educational needs 03/14/2017    Speech impediment 08/18/2016    Vitamin B12 deficiency 07/18/2016    Vitamin D deficiency 08/18/2016    Weight loss       Family History   Family history unknown: Yes      Past Surgical History:   Procedure Laterality Date    CHOLECYSTECTOMY          Current Outpatient Medications:     acetaminophen (TYLENOL) 325 MG tablet, Tylenol 325 mg oral tablet take 1 tablet (325 mg) by oral route every 4 hours as needed   Suspended, Disp: , Rfl:     albuterol (ACCUNEB) 1.25 MG/3ML nebulizer solution, Take 3 mL by nebulization Every 6 (Six) Hours As Needed for Wheezing or Shortness of Air., Disp: 30 mL, Rfl: 12    albuterol sulfate  (90 Base) MCG/ACT inhaler, Inhale 2 puffs Every 6 (Six) Hours As Needed for Wheezing., Disp: 1 g, Rfl: 3    cetirizine (zyrTEC) 10 MG tablet, Take 1 tablet by mouth Daily., Disp: 90 tablet, Rfl: 1    EPINEPHrine (EPIPEN) 0.3 MG/0.3ML solution auto-injector injection, 1 (One) Time., Disp: , Rfl:     Folic Acid 5 MG capsule, Take 5 mg by mouth Daily., Disp: 30 each, Rfl: 5    ketotifen (ZADITOR) 0.025 % ophthalmic solution, , Disp: , Rfl:     linaclotide (LINZESS) 290 MCG capsule capsule, Take 1 capsule by mouth Every Morning Before Breakfast., Disp: 30 capsule, Rfl: 11    mometasone-formoterol (Dulera) 50-5 MCG/ACT inhaler, Inhale 2 puffs 2 (Two) Times a Day., Disp: 1 each, Rfl: 11    montelukast (SINGULAIR) 10 MG tablet, Take 1 tablet by mouth Every Night., Disp: 90 tablet, Rfl: 0    omeprazole (priLOSEC) 40 MG capsule, Take 1 capsule by mouth Daily. Patient has been taking PPI, Disp: 30 capsule, Rfl: 5    ondansetron ODT (ZOFRAN-ODT) 8 MG disintegrating tablet, Place 1 tablet on the  "tongue Every 8 (Eight) Hours As Needed for Nausea or Vomiting., Disp: 30 tablet, Rfl: 2    Spacer/Aero-Holding Chambers (EQ Space Chamber Anti-Static) device, USE AS DIRECTED WITH HAND HELD INHALER, Disp: , Rfl:     sucralfate (Carafate) 1 g tablet, Take 1 tablet by mouth 4 (Four) Times a Day., Disp: 120 tablet, Rfl: 5    vitamin B-12 (CYANOCOBALAMIN) 1000 MCG tablet, Take 1 tablet by mouth Daily., Disp: 30 tablet, Rfl: 5    vitamin B-6 (PYRIDOXINE) 50 MG tablet, Take 1 tablet by mouth Daily., Disp: 30 tablet, Rfl: 5    vitamin D (ERGOCALCIFEROL) 1.25 MG (79808 UT) capsule capsule, Take 1 capsule by mouth 1 (One) Time Per Week., Disp: 13 capsule, Rfl: 0    zinc oxide (Desitin Daily Defense) 13 % cream cream, Apply 1 application  topically to the appropriate area as directed Every 1 (One) Hour As Needed (skin irritation)., Disp: 57 g, Rfl: 0    OBJECTIVE  Vital Signs:   /68 (BP Location: Left arm, Patient Position: Sitting, Cuff Size: Adult)   Pulse 89   Ht 190.5 cm (75\")   Wt 64.3 kg (141 lb 12.8 oz)   SpO2 99%   BMI 17.72 kg/m²    Estimated body mass index is 17.72 kg/m² as calculated from the following:    Height as of this encounter: 190.5 cm (75\").    Weight as of this encounter: 64.3 kg (141 lb 12.8 oz).     Wt Readings from Last 3 Encounters:   01/25/24 64.3 kg (141 lb 12.8 oz)   01/11/24 64 kg (141 lb)   12/28/23 61.7 kg (136 lb)     BP Readings from Last 3 Encounters:   01/25/24 110/68   01/11/24 118/79   12/28/23 121/75       Physical Exam  Vitals reviewed.   Constitutional:       General: He is not in acute distress.     Appearance: He is not ill-appearing.   HENT:      Head: Normocephalic and atraumatic.   Eyes:      Conjunctiva/sclera: Conjunctivae normal.   Cardiovascular:      Rate and Rhythm: Normal rate and regular rhythm.      Heart sounds: Normal heart sounds.   Pulmonary:      Effort: Pulmonary effort is normal.      Breath sounds: Normal breath sounds.   Musculoskeletal:      Cervical " back: Normal range of motion.   Skin:     General: Skin is warm and dry.   Neurological:      Mental Status: He is alert and oriented to person, place, and time.   Psychiatric:         Mood and Affect: Mood normal.         Behavior: Behavior normal.         Thought Content: Thought content normal.         Judgment: Judgment normal.          Result Review    Common labs          7/25/2023    09:59   Common Labs   Glucose 104    BUN 7    Creatinine 0.97    Sodium 143    Potassium 3.7    Chloride 106    Calcium 9.5    Albumin 4.4    Total Bilirubin 1.2    Alkaline Phosphatase 81    AST (SGOT) 16    ALT (SGPT) 12    WBC 6.92    Hemoglobin 13.1    Hematocrit 38.9    Platelets 274    Total Cholesterol 153    Triglycerides 52    HDL Cholesterol 61    LDL Cholesterol  81        XR Chest 2 View    Result Date: 10/5/2023    1. Hyperexpansion of the lungs without focal consolidation     KATTY MORGAN MD       Electronically Signed and Approved By: KATTY MORGAN MD on 10/05/2023 at 14:42                 The above data has been reviewed by ARSH Tse 01/25/2024 07:58 EST.          Patient Care Team:  Laly Chi APRN as PCP - General (Nurse Practitioner)            ASSESSMENT & PLAN    Diagnoses and all orders for this visit:    1. Folic acid deficiency (Primary)  Comments:  refilled foalte, labs ordered  Orders:  -     Folic Acid 5 MG capsule; Take 5 mg by mouth Daily.  Dispense: 30 each; Refill: 5  -     Folate; Future    2. Underweight  Comments:  improving, continue to work on nutrition    3. Allergic rhinitis, unspecified seasonality, unspecified trigger  Comments:  refilled zyrtec, declining allergy shots with allergy and asthma currently  Orders:  -     montelukast (SINGULAIR) 10 MG tablet; Take 1 tablet by mouth Every Night.  Dispense: 90 tablet; Refill: 0    4. Gastroparesis  Comments:  refilled zofran, linzess, prilosec, carafate, decline GI referral at this time  Orders:  -     omeprazole (priLOSEC)  40 MG capsule; Take 1 capsule by mouth Daily. Patient has been taking PPI  Dispense: 30 capsule; Refill: 5  -     ondansetron ODT (ZOFRAN-ODT) 8 MG disintegrating tablet; Place 1 tablet on the tongue Every 8 (Eight) Hours As Needed for Nausea or Vomiting.  Dispense: 30 tablet; Refill: 2  -     sucralfate (Carafate) 1 g tablet; Take 1 tablet by mouth 4 (Four) Times a Day.  Dispense: 120 tablet; Refill: 5  -     Comprehensive Metabolic Panel; Future  -     CBC & Differential; Future    5. Vitamin B12 deficiency  Comments:  refilled cycobalamin supplement, labs ordered  Overview:  Poor control - begin folic acid 5mg daily    Orders:  -     vitamin B-12 (CYANOCOBALAMIN) 1000 MCG tablet; Take 1 tablet by mouth Daily.  Dispense: 30 tablet; Refill: 5  -     vitamin B-6 (PYRIDOXINE) 50 MG tablet; Take 1 tablet by mouth Daily.  Dispense: 30 tablet; Refill: 5  -     Vitamin B12; Future    6. Vitamin D deficiency  Comments:  refilled vit d supplement, labs ordered  Overview:  Poor control take Vit D 5000 IU daily and Vit D 50,000 IU weekly    Orders:  -     vitamin D (ERGOCALCIFEROL) 1.25 MG (06186 UT) capsule capsule; Take 1 capsule by mouth 1 (One) Time Per Week.  Dispense: 13 capsule; Refill: 0  -     Vitamin D 25 hydroxy; Future    7. Moderate asthma, unspecified whether complicated, unspecified whether persistent  Comments:  stable on current treatment regimen, continue to see pulmonology for management    8. Raynaud's phenomenon without gangrene  Comments:  discussed avoiding drastic temperature changes, wear gloves in the cold         Tobacco Use: Low Risk  (1/25/2024)    Patient History     Smoking Tobacco Use: Never     Smokeless Tobacco Use: Never     Passive Exposure: Never       Follow Up     Return in about 6 months (around 7/25/2024) for Next scheduled follow up, Annual physical.      Patient was given instructions and counseling regarding his condition or for health maintenance advice. Please see specific  information pulled into the AVS if appropriate.   I have reviewed information obtained and documented by others and I have confirmed the accuracy of this documented note.    ARSH Tse

## 2024-02-03 NOTE — TELEPHONE ENCOUNTER
Patient is asking for Earlier EMG but he's scheduled for 2 appts due to needing upper and lower ext EMG.   
Provider: AGUSTIN ROJO MD    Caller: ROBBIN    Relationship to Patient: MOTHER    Phone Number: [phone]    Reason for Call: CALLING FOR A SOONER APPT FOR EMG/NCV.  PT IS SCHEDULED FOR 12-28-23.  THERE IS A AN AVAILABLE APPT ON 10-5-23 AND CALLED S/W CJ AT CLINIC AND ADVISED TO SEND A MESSAGE D/T JOHN IN A ROOM WITH A PT.   ADVISED ROBBIN AND SHE V/U.    When was the patient last seen: NEW    PLEASE CALL & ADVISE   
"When I got up from bed I felt like I was getting rushes to my head"

## 2024-02-29 ENCOUNTER — TELEPHONE (OUTPATIENT)
Dept: FAMILY MEDICINE CLINIC | Facility: CLINIC | Age: 29
End: 2024-02-29
Payer: COMMERCIAL

## 2024-04-01 ENCOUNTER — LAB (OUTPATIENT)
Dept: LAB | Facility: HOSPITAL | Age: 29
End: 2024-04-01
Payer: COMMERCIAL

## 2024-04-01 DIAGNOSIS — K31.84 GASTROPARESIS: ICD-10-CM

## 2024-04-01 DIAGNOSIS — E53.8 FOLIC ACID DEFICIENCY: ICD-10-CM

## 2024-04-01 DIAGNOSIS — E55.9 VITAMIN D DEFICIENCY: ICD-10-CM

## 2024-04-01 DIAGNOSIS — E53.8 VITAMIN B12 DEFICIENCY: ICD-10-CM

## 2024-04-01 LAB
25(OH)D3 SERPL-MCNC: 22 NG/ML (ref 30–100)
ALBUMIN SERPL-MCNC: 4.1 G/DL (ref 3.5–5.2)
ALBUMIN/GLOB SERPL: 1.6 G/DL
ALP SERPL-CCNC: 78 U/L (ref 39–117)
ALT SERPL W P-5'-P-CCNC: 11 U/L (ref 1–41)
ANION GAP SERPL CALCULATED.3IONS-SCNC: 11.7 MMOL/L (ref 5–15)
AST SERPL-CCNC: 13 U/L (ref 1–40)
BASOPHILS # BLD AUTO: 0.03 10*3/MM3 (ref 0–0.2)
BASOPHILS NFR BLD AUTO: 0.4 % (ref 0–1.5)
BILIRUB SERPL-MCNC: 0.8 MG/DL (ref 0–1.2)
BUN SERPL-MCNC: 3 MG/DL (ref 6–20)
BUN/CREAT SERPL: 3.2 (ref 7–25)
CALCIUM SPEC-SCNC: 8.9 MG/DL (ref 8.6–10.5)
CHLORIDE SERPL-SCNC: 102 MMOL/L (ref 98–107)
CO2 SERPL-SCNC: 27.3 MMOL/L (ref 22–29)
CREAT SERPL-MCNC: 0.95 MG/DL (ref 0.76–1.27)
DEPRECATED RDW RBC AUTO: 41.2 FL (ref 37–54)
EGFRCR SERPLBLD CKD-EPI 2021: 111.1 ML/MIN/1.73
EOSINOPHIL # BLD AUTO: 0.15 10*3/MM3 (ref 0–0.4)
EOSINOPHIL NFR BLD AUTO: 2.1 % (ref 0.3–6.2)
ERYTHROCYTE [DISTWIDTH] IN BLOOD BY AUTOMATED COUNT: 12.7 % (ref 12.3–15.4)
FOLATE SERPL-MCNC: <2 NG/ML (ref 4.78–24.2)
GLOBULIN UR ELPH-MCNC: 2.5 GM/DL
GLUCOSE SERPL-MCNC: 91 MG/DL (ref 65–99)
HCT VFR BLD AUTO: 38 % (ref 37.5–51)
HGB BLD-MCNC: 12.5 G/DL (ref 13–17.7)
IMM GRANULOCYTES # BLD AUTO: 0.02 10*3/MM3 (ref 0–0.05)
IMM GRANULOCYTES NFR BLD AUTO: 0.3 % (ref 0–0.5)
LYMPHOCYTES # BLD AUTO: 2.96 10*3/MM3 (ref 0.7–3.1)
LYMPHOCYTES NFR BLD AUTO: 41.3 % (ref 19.6–45.3)
MCH RBC QN AUTO: 29.3 PG (ref 26.6–33)
MCHC RBC AUTO-ENTMCNC: 32.9 G/DL (ref 31.5–35.7)
MCV RBC AUTO: 89.2 FL (ref 79–97)
MONOCYTES # BLD AUTO: 0.5 10*3/MM3 (ref 0.1–0.9)
MONOCYTES NFR BLD AUTO: 7 % (ref 5–12)
NEUTROPHILS NFR BLD AUTO: 3.5 10*3/MM3 (ref 1.7–7)
NEUTROPHILS NFR BLD AUTO: 48.9 % (ref 42.7–76)
NRBC BLD AUTO-RTO: 0 /100 WBC (ref 0–0.2)
PLATELET # BLD AUTO: 250 10*3/MM3 (ref 140–450)
PMV BLD AUTO: 10.4 FL (ref 6–12)
POTASSIUM SERPL-SCNC: 3.7 MMOL/L (ref 3.5–5.2)
PROT SERPL-MCNC: 6.6 G/DL (ref 6–8.5)
RBC # BLD AUTO: 4.26 10*6/MM3 (ref 4.14–5.8)
SODIUM SERPL-SCNC: 141 MMOL/L (ref 136–145)
VIT B12 BLD-MCNC: 385 PG/ML (ref 211–946)
WBC NRBC COR # BLD AUTO: 7.16 10*3/MM3 (ref 3.4–10.8)

## 2024-04-01 PROCEDURE — 36415 COLL VENOUS BLD VENIPUNCTURE: CPT

## 2024-04-01 PROCEDURE — 82306 VITAMIN D 25 HYDROXY: CPT

## 2024-04-01 PROCEDURE — 85025 COMPLETE CBC W/AUTO DIFF WBC: CPT

## 2024-04-01 PROCEDURE — 82746 ASSAY OF FOLIC ACID SERUM: CPT

## 2024-04-01 PROCEDURE — 80053 COMPREHEN METABOLIC PANEL: CPT

## 2024-04-01 PROCEDURE — 82607 VITAMIN B-12: CPT

## 2024-04-10 ENCOUNTER — OFFICE VISIT (OUTPATIENT)
Dept: FAMILY MEDICINE CLINIC | Facility: CLINIC | Age: 29
End: 2024-04-10
Payer: COMMERCIAL

## 2024-04-10 VITALS
HEART RATE: 95 BPM | DIASTOLIC BLOOD PRESSURE: 86 MMHG | OXYGEN SATURATION: 100 % | HEIGHT: 75 IN | SYSTOLIC BLOOD PRESSURE: 110 MMHG | WEIGHT: 141.8 LBS | BODY MASS INDEX: 17.63 KG/M2

## 2024-04-10 DIAGNOSIS — F32.A DEPRESSION, UNSPECIFIED DEPRESSION TYPE: ICD-10-CM

## 2024-04-10 DIAGNOSIS — I73.00 RAYNAUD'S DISEASE WITHOUT GANGRENE: Primary | ICD-10-CM

## 2024-04-10 DIAGNOSIS — E53.9 VITAMIN B DEFICIENCY: ICD-10-CM

## 2024-04-10 PROCEDURE — 1159F MED LIST DOCD IN RCRD: CPT

## 2024-04-10 PROCEDURE — 99214 OFFICE O/P EST MOD 30 MIN: CPT

## 2024-04-10 PROCEDURE — 1160F RVW MEDS BY RX/DR IN RCRD: CPT

## 2024-04-10 RX ORDER — LACTOBACILLUS ACIDOPHILUS/PECT 30 MG-20MG
250 TABLET ORAL DAILY
Qty: 90 TABLET | Refills: 1 | Status: SHIPPED | OUTPATIENT
Start: 2024-04-10

## 2024-04-10 RX ORDER — AMLODIPINE BESYLATE 2.5 MG/1
2.5 TABLET ORAL DAILY
Qty: 30 TABLET | Refills: 0 | Status: SHIPPED | OUTPATIENT
Start: 2024-04-10

## 2024-04-10 NOTE — PROGRESS NOTES
Chief Complaint  Depression and Numbness    SUBJECTIVE  Corentez Woodrow Hathaway presents to [facility]    History of Present Illness  Patient is a 29 y.o. male who presents today with complaints of continuing numbness, tingling and pain in hands and feet. He was sent to neuro for EMG/nerve conduction that was unremarkable. He reports it is worse in cold environments. He noticed his fingers turn white and are painful when spells occur.       He has also been suffering with depression. Only has some sad days, not constant. This has worsened since his grandmother passed away. He would like to be seen by psych and try therapy as well. Denies any SI.     Past Medical History:   Diagnosis Date    Allergic rhinitis     Anxiety     Asthma 07/28/2015    Chronic bronchitis     Depression     Esophagitis, eosinophilic 08/18/2016    Fecal incontinence alternating with constipation 08/18/2016    Gastroparesis 07/28/2015    Jaundice     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 08/18/2016    Mental impairment 08/18/2016    Panic attacks     Pelvic floor dysfunction 07/28/2015    Special educational needs 03/14/2017    Speech impediment 08/18/2016    Vitamin B12 deficiency 07/18/2016    Vitamin D deficiency 08/18/2016    Weight loss       Family History   Family history unknown: Yes      Past Surgical History:   Procedure Laterality Date    CHOLECYSTECTOMY          Current Outpatient Medications:     albuterol (ACCUNEB) 1.25 MG/3ML nebulizer solution, Take 3 mL by nebulization Every 6 (Six) Hours As Needed for Wheezing or Shortness of Air., Disp: 30 mL, Rfl: 12    albuterol sulfate  (90 Base) MCG/ACT inhaler, Inhale 2 puffs Every 6 (Six) Hours As Needed for Wheezing., Disp: 1 g, Rfl: 3    azelastine (ASTELIN) 0.1 % nasal spray, 2 sprays into the nostril(s) as directed by provider 2 (Two) Times a Day. Use in each nostril as directed, Disp: 30 mL, Rfl: 0    Carboxymethylcellulose Sodium  "(EYE DROPS OP), INSTILL 1 DROP INTO BOTH EYES 2 (TWO) TIMES A DAY, Disp: , Rfl:     cetirizine (zyrTEC) 10 MG tablet, Take 1 tablet by mouth Daily., Disp: 90 tablet, Rfl: 1    EPINEPHrine (EPIPEN) 0.3 MG/0.3ML solution auto-injector injection, 1 (One) Time., Disp: , Rfl:     Folic Acid 5 MG capsule, Take 5 mg by mouth Daily., Disp: 30 each, Rfl: 5    ketotifen (ZADITOR) 0.025 % ophthalmic solution, , Disp: , Rfl:     linaclotide (LINZESS) 290 MCG capsule capsule, Take 1 capsule by mouth Every Morning Before Breakfast., Disp: 30 capsule, Rfl: 11    mometasone-formoterol (Dulera) 50-5 MCG/ACT inhaler, Inhale 2 puffs 2 (Two) Times a Day., Disp: 1 each, Rfl: 11    montelukast (SINGULAIR) 10 MG tablet, Take 1 tablet by mouth Every Night., Disp: 90 tablet, Rfl: 0    omeprazole (priLOSEC) 40 MG capsule, Take 1 capsule by mouth Daily. Patient has been taking PPI, Disp: 30 capsule, Rfl: 5    Pyridoxine HCl (vitamin B-6) 250 MG tablet, Take 250 mg by mouth Daily., Disp: 90 tablet, Rfl: 1    Spacer/Aero-Holding Chambers (EQ Space Chamber Anti-Static) device, USE AS DIRECTED WITH HAND HELD INHALER, Disp: , Rfl:     sucralfate (Carafate) 1 g tablet, Take 1 tablet by mouth 4 (Four) Times a Day., Disp: 120 tablet, Rfl: 5    vitamin B-12 (CYANOCOBALAMIN) 1000 MCG tablet, Take 1 tablet by mouth Daily., Disp: 30 tablet, Rfl: 5    vitamin D (ERGOCALCIFEROL) 1.25 MG (39177 UT) capsule capsule, Take 1 capsule by mouth 1 (One) Time Per Week., Disp: 13 capsule, Rfl: 0    zinc oxide (Desitin Daily Defense) 13 % cream cream, Apply 1 application  topically to the appropriate area as directed Every 1 (One) Hour As Needed (skin irritation)., Disp: 57 g, Rfl: 0    amLODIPine (NORVASC) 2.5 MG tablet, Take 1 tablet by mouth Daily., Disp: 30 tablet, Rfl: 0    OBJECTIVE  Vital Signs:   /86   Pulse 95   Ht 190.5 cm (75\")   Wt 64.3 kg (141 lb 12.8 oz)   SpO2 100%   BMI 17.72 kg/m²    Estimated body mass index is 17.72 kg/m² as calculated " "from the following:    Height as of this encounter: 190.5 cm (75\").    Weight as of this encounter: 64.3 kg (141 lb 12.8 oz).     Wt Readings from Last 3 Encounters:   04/10/24 64.3 kg (141 lb 12.8 oz)   03/06/24 64.9 kg (143 lb)   01/25/24 64.3 kg (141 lb 12.8 oz)     BP Readings from Last 3 Encounters:   04/10/24 110/86   03/06/24 118/86   01/25/24 110/68       Physical Exam  Vitals reviewed.   Constitutional:       General: He is not in acute distress.     Appearance: He is not ill-appearing.   HENT:      Head: Normocephalic and atraumatic.   Eyes:      Conjunctiva/sclera: Conjunctivae normal.   Cardiovascular:      Rate and Rhythm: Normal rate and regular rhythm.      Heart sounds: Normal heart sounds.      Comments: Cap refill 4-5 seconds fingers  Pulmonary:      Effort: Pulmonary effort is normal.      Breath sounds: Normal breath sounds.   Musculoskeletal:      Cervical back: Normal range of motion.   Skin:     General: Skin is warm and dry.   Neurological:      Mental Status: He is alert and oriented to person, place, and time.   Psychiatric:         Mood and Affect: Mood normal.         Behavior: Behavior normal.         Thought Content: Thought content normal.         Judgment: Judgment normal.          Result Review    Common labs          7/25/2023    09:59 4/1/2024    10:10   Common Labs   Glucose 104  91    BUN 7  3    Creatinine 0.97  0.95    Sodium 143  141    Potassium 3.7  3.7    Chloride 106  102    Calcium 9.5  8.9    Albumin 4.4  4.1    Total Bilirubin 1.2  0.8    Alkaline Phosphatase 81  78    AST (SGOT) 16  13    ALT (SGPT) 12  11    WBC 6.92  7.16    Hemoglobin 13.1  12.5    Hematocrit 38.9  38.0    Platelets 274  250    Total Cholesterol 153     Triglycerides 52     HDL Cholesterol 61     LDL Cholesterol  81         No Images in the past 120 days found..      The above data has been reviewed by ARSH Tse 04/10/2024 09:13 EDT.          Patient Care Team:  Laly Chi APRN as PCP " - General (Nurse Practitioner)            ASSESSMENT & PLAN    Diagnoses and all orders for this visit:    1. Raynaud's disease without gangrene (Primary)  Comments:  start amlodipine 2.5 mg, follow up in 2 weeks, discussed side effects  Orders:  -     amLODIPine (NORVASC) 2.5 MG tablet; Take 1 tablet by mouth Daily.  Dispense: 30 tablet; Refill: 0    2. Vitamin B deficiency  Comments:  increased b6 dosing as requested  Orders:  -     Pyridoxine HCl (vitamin B-6) 250 MG tablet; Take 250 mg by mouth Daily.  Dispense: 90 tablet; Refill: 1    3. Depression, unspecified depression type  Comments:  ref to psych placed  Orders:  -     Ambulatory Referral to Behavioral Health         Tobacco Use: Low Risk  (4/10/2024)    Patient History     Smoking Tobacco Use: Never     Smokeless Tobacco Use: Never     Passive Exposure: Never       Follow Up     Return in about 2 weeks (around 4/24/2024) for Next scheduled follow up.      Patient was given instructions and counseling regarding his condition or for health maintenance advice. Please see specific information pulled into the AVS if appropriate.   I have reviewed information obtained and documented by others and I have confirmed the accuracy of this documented note.    Laly Chi, APRN

## 2024-04-24 ENCOUNTER — OFFICE VISIT (OUTPATIENT)
Dept: FAMILY MEDICINE CLINIC | Facility: CLINIC | Age: 29
End: 2024-04-24
Payer: COMMERCIAL

## 2024-04-24 VITALS
HEIGHT: 75 IN | HEART RATE: 92 BPM | BODY MASS INDEX: 17.78 KG/M2 | DIASTOLIC BLOOD PRESSURE: 71 MMHG | OXYGEN SATURATION: 94 % | WEIGHT: 143 LBS | SYSTOLIC BLOOD PRESSURE: 120 MMHG

## 2024-04-24 DIAGNOSIS — K59.02 CONSTIPATION DUE TO OUTLET DYSFUNCTION: ICD-10-CM

## 2024-04-24 DIAGNOSIS — E53.8 VITAMIN B12 DEFICIENCY: ICD-10-CM

## 2024-04-24 DIAGNOSIS — K31.84 GASTROPARESIS: ICD-10-CM

## 2024-04-24 DIAGNOSIS — J30.9 ALLERGIC RHINITIS, UNSPECIFIED SEASONALITY, UNSPECIFIED TRIGGER: ICD-10-CM

## 2024-04-24 DIAGNOSIS — E55.9 VITAMIN D DEFICIENCY: ICD-10-CM

## 2024-04-24 DIAGNOSIS — E53.8 FOLIC ACID DEFICIENCY: ICD-10-CM

## 2024-04-24 DIAGNOSIS — F41.9 ANXIETY: ICD-10-CM

## 2024-04-24 DIAGNOSIS — I73.00 RAYNAUD'S DISEASE WITHOUT GANGRENE: Primary | ICD-10-CM

## 2024-04-24 DIAGNOSIS — F32.A DEPRESSION, UNSPECIFIED DEPRESSION TYPE: ICD-10-CM

## 2024-04-24 PROCEDURE — 1159F MED LIST DOCD IN RCRD: CPT

## 2024-04-24 PROCEDURE — 99214 OFFICE O/P EST MOD 30 MIN: CPT

## 2024-04-24 PROCEDURE — 1160F RVW MEDS BY RX/DR IN RCRD: CPT

## 2024-04-24 RX ORDER — ERGOCALCIFEROL 1.25 MG/1
50000 CAPSULE ORAL WEEKLY
Qty: 13 CAPSULE | Refills: 0 | Status: SHIPPED | OUTPATIENT
Start: 2024-04-24

## 2024-04-24 RX ORDER — AMLODIPINE BESYLATE 2.5 MG/1
2.5 TABLET ORAL DAILY
Qty: 90 TABLET | Refills: 1 | Status: SHIPPED | OUTPATIENT
Start: 2024-04-24

## 2024-04-24 RX ORDER — EPINEPHRINE 0.3 MG/.3ML
0.3 INJECTION SUBCUTANEOUS ONCE
Qty: 1 EACH | Refills: 0 | Status: SHIPPED | OUTPATIENT
Start: 2024-04-24 | End: 2024-04-24

## 2024-04-24 RX ORDER — SUCRALFATE 1 G/1
1 TABLET ORAL 4 TIMES DAILY
Qty: 120 TABLET | Refills: 5 | Status: SHIPPED | OUTPATIENT
Start: 2024-04-24

## 2024-04-24 RX ORDER — OMEPRAZOLE 40 MG/1
40 CAPSULE, DELAYED RELEASE ORAL DAILY
Qty: 30 CAPSULE | Refills: 5 | Status: SHIPPED | OUTPATIENT
Start: 2024-04-24

## 2024-04-24 RX ORDER — LANOLIN ALCOHOL/MO/W.PET/CERES
1000 CREAM (GRAM) TOPICAL DAILY
Qty: 30 TABLET | Refills: 5 | Status: SHIPPED | OUTPATIENT
Start: 2024-04-24

## 2024-04-24 RX ORDER — MONTELUKAST SODIUM 10 MG/1
10 TABLET ORAL NIGHTLY
Qty: 90 TABLET | Refills: 0 | Status: SHIPPED | OUTPATIENT
Start: 2024-04-24

## 2024-04-24 RX ORDER — FOLIC ACID 5 MG
5 CAPSULE ORAL DAILY
Qty: 30 EACH | Refills: 5 | Status: SHIPPED | OUTPATIENT
Start: 2024-04-24

## 2024-04-24 RX ORDER — CETIRIZINE HYDROCHLORIDE 10 MG/1
10 TABLET ORAL DAILY
Qty: 90 TABLET | Refills: 1 | Status: SHIPPED | OUTPATIENT
Start: 2024-04-24

## 2024-04-24 NOTE — PROGRESS NOTES
Chief Complaint  raynaud's disease and Numbness    SUBJECTIVE  Corentez Woodrow Hathaway presents to [facility]    History of Present Illness  Patient is a 29-year-old male presents today for 2-week follow-up.  Last appointments are amlodipine 2.5 mg for Raynaud's.  Patient reports this has provided some relief for him. He has noticed less painful episodes in both his hands and feet.  He felt like the first couple of days he had some dizziness that subsided. He has not taken in 2 days because he had a panic attack and thought the medication would stop his heart.     Last appointment we discussed his depression.  Patient has been more depressed since his grandmother's passing.  We discussed potential medications as time but decided to wait and see how he tolerated the amlodipine first.  He has an appointment with behavioral health, Dr. Aguilar Brantley on 5/8/2024. He is very anxious today and has vasile having more panic attacks.     He is also requesting several medication refills.    Past Medical History:   Diagnosis Date    Allergic rhinitis     Anxiety     Asthma 07/28/2015    Chronic bronchitis     Depression     Esophagitis, eosinophilic 08/18/2016    Fecal incontinence alternating with constipation 08/18/2016    Gastroparesis 07/28/2015    Jaundice     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 08/18/2016    Mental impairment 08/18/2016    Panic attacks     Pelvic floor dysfunction 07/28/2015    Special educational needs 03/14/2017    Speech impediment 08/18/2016    Vitamin B12 deficiency 07/18/2016    Vitamin D deficiency 08/18/2016    Weight loss       Family History   Family history unknown: Yes      Past Surgical History:   Procedure Laterality Date    CHOLECYSTECTOMY          Current Outpatient Medications:     albuterol (ACCUNEB) 1.25 MG/3ML nebulizer solution, Take 3 mL by nebulization Every 6 (Six) Hours As Needed for Wheezing or Shortness of Air., Disp: 30 mL, Rfl: 12     albuterol sulfate  (90 Base) MCG/ACT inhaler, Inhale 2 puffs Every 6 (Six) Hours As Needed for Wheezing., Disp: 1 g, Rfl: 3    amLODIPine (NORVASC) 2.5 MG tablet, Take 1 tablet by mouth Daily., Disp: 30 tablet, Rfl: 0    azelastine (ASTELIN) 0.1 % nasal spray, 2 sprays into the nostril(s) as directed by provider 2 (Two) Times a Day. Use in each nostril as directed, Disp: 30 mL, Rfl: 0    Carboxymethylcellulose Sodium (EYE DROPS OP), INSTILL 1 DROP INTO BOTH EYES 2 (TWO) TIMES A DAY, Disp: , Rfl:     cetirizine (zyrTEC) 10 MG tablet, Take 1 tablet by mouth Daily., Disp: 90 tablet, Rfl: 1    EPINEPHrine (EPIPEN) 0.3 MG/0.3ML solution auto-injector injection, 1 (One) Time., Disp: , Rfl:     Folic Acid 5 MG capsule, Take 5 mg by mouth Daily., Disp: 30 each, Rfl: 5    ketotifen (ZADITOR) 0.025 % ophthalmic solution, , Disp: , Rfl:     linaclotide (LINZESS) 290 MCG capsule capsule, Take 1 capsule by mouth Every Morning Before Breakfast., Disp: 30 capsule, Rfl: 11    mometasone-formoterol (Dulera) 50-5 MCG/ACT inhaler, Inhale 2 puffs 2 (Two) Times a Day., Disp: 1 each, Rfl: 11    montelukast (SINGULAIR) 10 MG tablet, Take 1 tablet by mouth Every Night., Disp: 90 tablet, Rfl: 0    omeprazole (priLOSEC) 40 MG capsule, Take 1 capsule by mouth Daily. Patient has been taking PPI, Disp: 30 capsule, Rfl: 5    Pyridoxine HCl (vitamin B-6) 250 MG tablet, Take 250 mg by mouth Daily., Disp: 90 tablet, Rfl: 1    Spacer/Aero-Holding Chambers (EQ Space Chamber Anti-Static) device, USE AS DIRECTED WITH HAND HELD INHALER, Disp: , Rfl:     sucralfate (Carafate) 1 g tablet, Take 1 tablet by mouth 4 (Four) Times a Day., Disp: 120 tablet, Rfl: 5    vitamin B-12 (CYANOCOBALAMIN) 1000 MCG tablet, Take 1 tablet by mouth Daily., Disp: 30 tablet, Rfl: 5    vitamin D (ERGOCALCIFEROL) 1.25 MG (78472 UT) capsule capsule, Take 1 capsule by mouth 1 (One) Time Per Week., Disp: 13 capsule, Rfl: 0    zinc oxide (Desitin Daily Defense) 13 % cream  "cream, Apply 1 application  topically to the appropriate area as directed Every 1 (One) Hour As Needed (skin irritation)., Disp: 57 g, Rfl: 0    OBJECTIVE  Vital Signs:   /71   Pulse 92   Ht 190.5 cm (75\")   Wt 64.9 kg (143 lb)   SpO2 94%   BMI 17.87 kg/m²    Estimated body mass index is 17.87 kg/m² as calculated from the following:    Height as of this encounter: 190.5 cm (75\").    Weight as of this encounter: 64.9 kg (143 lb).     Wt Readings from Last 3 Encounters:   04/24/24 64.9 kg (143 lb)   04/10/24 64.3 kg (141 lb 12.8 oz)   03/06/24 64.9 kg (143 lb)     BP Readings from Last 3 Encounters:   04/24/24 120/71   04/10/24 110/86   03/06/24 118/86       Physical Exam  Vitals reviewed.   Constitutional:       General: He is not in acute distress.     Appearance: He is not ill-appearing.   HENT:      Head: Normocephalic and atraumatic.   Eyes:      Conjunctiva/sclera: Conjunctivae normal.   Cardiovascular:      Rate and Rhythm: Normal rate and regular rhythm.      Heart sounds: Normal heart sounds.   Pulmonary:      Effort: Pulmonary effort is normal.      Breath sounds: Normal breath sounds.   Musculoskeletal:      Cervical back: Normal range of motion.   Skin:     General: Skin is warm and dry.   Neurological:      Mental Status: He is alert and oriented to person, place, and time.   Psychiatric:         Mood and Affect: Mood normal.         Behavior: Behavior normal.         Thought Content: Thought content normal.         Judgment: Judgment normal.          Result Review    Common labs          7/25/2023    09:59 4/1/2024    10:10   Common Labs   Glucose 104  91    BUN 7  3    Creatinine 0.97  0.95    Sodium 143  141    Potassium 3.7  3.7    Chloride 106  102    Calcium 9.5  8.9    Albumin 4.4  4.1    Total Bilirubin 1.2  0.8    Alkaline Phosphatase 81  78    AST (SGOT) 16  13    ALT (SGPT) 12  11    WBC 6.92  7.16    Hemoglobin 13.1  12.5    Hematocrit 38.9  38.0    Platelets 274  250    Total " Cholesterol 153     Triglycerides 52     HDL Cholesterol 61     LDL Cholesterol  81         No Images in the past 120 days found..      The above data has been reviewed by ARSH Tse 04/24/2024 09:12 EDT.          Patient Care Team:  Laly Chi APRN as PCP - General (Nurse Practitioner)            ASSESSMENT & PLAN    Diagnoses and all orders for this visit:    1. Raynaud's disease without gangrene (Primary)  Comments:  continue amlodipine, refills sent    2. Depression, unspecified depression type  Comments:  keep appt with psych next month    3. Anxiety  Comments:  keep appt with pysch next month    4. Allergic rhinitis, unspecified seasonality, unspecified trigger  Comments:  refilled zyrtec    5. Folic acid deficiency  Comments:  refilled folate    6. Constipation due to outlet dysfunction  Comments:  linzess refilled    7. Gastroparesis  Comments:  refilled zofran, linzess, prilosec, carafate, decline GI referral at this time    8. Vitamin B12 deficiency  Comments:  refilled cycobalamin supplement    9. Vitamin D deficiency  Comments:  refilled vit d supplement  Overview:  Poor control take Vit D 5000 IU daily and Vit D 50,000 IU weekly           Tobacco Use: Low Risk  (4/24/2024)    Patient History     Smoking Tobacco Use: Never     Smokeless Tobacco Use: Never     Passive Exposure: Never       Follow Up     Return in about 6 months (around 10/24/2024) for Next scheduled follow up.      Patient was given instructions and counseling regarding his condition or for health maintenance advice. Please see specific information pulled into the AVS if appropriate.   I have reviewed information obtained and documented by others and I have confirmed the accuracy of this documented note.    ARSH Tse

## 2024-05-08 ENCOUNTER — OFFICE VISIT (OUTPATIENT)
Dept: PSYCHIATRY | Facility: CLINIC | Age: 29
End: 2024-05-08
Payer: COMMERCIAL

## 2024-05-08 VITALS
DIASTOLIC BLOOD PRESSURE: 80 MMHG | SYSTOLIC BLOOD PRESSURE: 117 MMHG | HEIGHT: 75 IN | HEART RATE: 80 BPM | BODY MASS INDEX: 17.68 KG/M2 | WEIGHT: 142.2 LBS

## 2024-05-08 DIAGNOSIS — F41.1 GAD (GENERALIZED ANXIETY DISORDER): Primary | ICD-10-CM

## 2024-05-08 DIAGNOSIS — F33.1 MODERATE EPISODE OF RECURRENT MAJOR DEPRESSIVE DISORDER: ICD-10-CM

## 2024-05-08 RX ORDER — ESCITALOPRAM OXALATE 5 MG/1
5 TABLET ORAL DAILY
Qty: 30 TABLET | Refills: 1 | Status: SHIPPED | OUTPATIENT
Start: 2024-05-08

## 2024-05-08 RX ORDER — ONDANSETRON 8 MG/1
TABLET, ORALLY DISINTEGRATING ORAL
COMMUNITY
Start: 2024-04-23

## 2024-05-08 RX ORDER — EPINEPHRINE 0.3 MG/.3ML
INJECTION SUBCUTANEOUS
COMMUNITY
Start: 2024-05-02

## 2024-05-16 ENCOUNTER — OFFICE VISIT (OUTPATIENT)
Dept: PULMONOLOGY | Facility: CLINIC | Age: 29
End: 2024-05-16
Payer: COMMERCIAL

## 2024-05-16 VITALS
TEMPERATURE: 98.2 F | SYSTOLIC BLOOD PRESSURE: 101 MMHG | HEIGHT: 75 IN | HEART RATE: 80 BPM | BODY MASS INDEX: 17.33 KG/M2 | OXYGEN SATURATION: 95 % | RESPIRATION RATE: 16 BRPM | DIASTOLIC BLOOD PRESSURE: 69 MMHG | WEIGHT: 139.4 LBS

## 2024-05-16 DIAGNOSIS — J45.30 MILD PERSISTENT ASTHMA, UNSPECIFIED WHETHER COMPLICATED: ICD-10-CM

## 2024-05-16 DIAGNOSIS — J45.909 MODERATE ASTHMA, UNSPECIFIED WHETHER COMPLICATED, UNSPECIFIED WHETHER PERSISTENT: Primary | ICD-10-CM

## 2024-05-16 PROCEDURE — 1159F MED LIST DOCD IN RCRD: CPT | Performed by: INTERNAL MEDICINE

## 2024-05-16 PROCEDURE — 1160F RVW MEDS BY RX/DR IN RCRD: CPT | Performed by: INTERNAL MEDICINE

## 2024-05-16 PROCEDURE — 99213 OFFICE O/P EST LOW 20 MIN: CPT | Performed by: INTERNAL MEDICINE

## 2024-05-16 RX ORDER — ALBUTEROL SULFATE 1.25 MG/3ML
1 SOLUTION RESPIRATORY (INHALATION) EVERY 6 HOURS PRN
Qty: 30 ML | Refills: 12 | Status: SHIPPED | OUTPATIENT
Start: 2024-05-16

## 2024-05-16 RX ORDER — MOMETASONE FUROATE AND FORMOTEROL FUMARATE DIHYDRATE 50; 5 UG/1; UG/1
2 AEROSOL RESPIRATORY (INHALATION)
Qty: 1 EACH | Refills: 11 | Status: SHIPPED | OUTPATIENT
Start: 2024-05-16

## 2024-05-16 RX ORDER — AZITHROMYCIN 250 MG/1
TABLET, FILM COATED ORAL
Qty: 6 TABLET | Refills: 0 | Status: SHIPPED | OUTPATIENT
Start: 2024-05-16

## 2024-05-16 RX ORDER — ALBUTEROL SULFATE 90 UG/1
2 AEROSOL, METERED RESPIRATORY (INHALATION) EVERY 6 HOURS PRN
Qty: 1 G | Refills: 3 | Status: SHIPPED | OUTPATIENT
Start: 2024-05-16

## 2024-05-16 NOTE — PROGRESS NOTES
Pulmonary Office Follow-up    Subjective     Giselle Hathaway is seen today at the office for   Chief Complaint   Patient presents with    Follow-up     4  month    Asthma    Bronchitis    Cough         HPI  Giselle Hathaway is a 29 y.o. male with a PMH significant for bronchial asthma presents for follow-up patient has not been very compliant with his inhalers recently patient complains of cough with mucopurulent expectoration along with nasal congestion and drainage      Tobacco use history:  Never smoker      Patient Active Problem List   Diagnosis    Allergic rhinitis    Asthma    Chronic bronchitis    Moderate episode of recurrent major depressive disorder    Disorder of pelvis    Educational circumstance    Esophagitis, eosinophilic    Fecal incontinence alternating with constipation    Gastroparesis    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    Mental impairment    Speech disturbance    Vitamin B12 deficiency    Vitamin D deficiency    Constipation due to outlet dysfunction    Other chronic pain       Review of Systems  Review of Systems   Respiratory:  Positive for cough and shortness of breath.    All other systems reviewed and are negative.    As described in the HPI. Otherwise, remainder of ROS (14 systems) were negative.    Medications, Allergies, Social, and Family Histories reviewed as per EMR.    Result Review :            Objective     Vitals:    05/16/24 1011   BP: 101/69   Pulse: 80   Resp: 16   Temp: 98.2 °F (36.8 °C)   SpO2: 95%         05/16/24  1011   Weight: 63.2 kg (139 lb 6.4 oz)       Physical Exam  Vitals and nursing note reviewed.   Constitutional:       Appearance: Normal appearance.   HENT:      Head: Normocephalic and atraumatic.      Nose: Congestion present.      Mouth/Throat:      Mouth: Mucous membranes are moist.   Eyes:      Extraocular Movements: Extraocular movements intact.      Pupils: Pupils are equal, round, and reactive to light.   Cardiovascular:       Rate and Rhythm: Normal rate and regular rhythm.      Pulses: Normal pulses.      Heart sounds: Normal heart sounds.   Pulmonary:      Effort: Pulmonary effort is normal.      Breath sounds: Rhonchi present.   Musculoskeletal:      Cervical back: Normal range of motion and neck supple.   Neurological:      Mental Status: He is alert.         No radiology results for the last 90 days.     Assessment & Plan     Diagnoses and all orders for this visit:    1. Moderate asthma, unspecified whether complicated, unspecified whether persistent (Primary)  -     albuterol sulfate  (90 Base) MCG/ACT inhaler; Inhale 2 puffs Every 6 (Six) Hours As Needed for Wheezing.  Dispense: 1 g; Refill: 3  -     albuterol (ACCUNEB) 1.25 MG/3ML nebulizer solution; Take 3 mL by nebulization Every 6 (Six) Hours As Needed for Wheezing or Shortness of Air.  Dispense: 30 mL; Refill: 12  -     mometasone-formoterol (Dulera) 50-5 MCG/ACT inhaler; Inhale 2 puffs 2 (Two) Times a Day.  Dispense: 1 each; Refill: 11    2. Mild persistent asthma, unspecified whether complicated  Comments:  start symbicort inahler, nebulizer solution rtefilled  Orders:  -     albuterol sulfate  (90 Base) MCG/ACT inhaler; Inhale 2 puffs Every 6 (Six) Hours As Needed for Wheezing.  Dispense: 1 g; Refill: 3  -     albuterol (ACCUNEB) 1.25 MG/3ML nebulizer solution; Take 3 mL by nebulization Every 6 (Six) Hours As Needed for Wheezing or Shortness of Air.  Dispense: 30 mL; Refill: 12  -     mometasone-formoterol (Dulera) 50-5 MCG/ACT inhaler; Inhale 2 puffs 2 (Two) Times a Day.  Dispense: 1 each; Refill: 11    Other orders  -     azithromycin (ZITHROMAX) 250 MG tablet; Take 2 by mouth today then 1 daily for 4 days  Dispense: 6 tablet; Refill: 0         Discussion/ Recommendations:   Will start him on Zithromax  Advised compliance with his medications  Continue Dulera twice daily  Albuterol inhaler as needed  Vaccinations discussed and recommended              Return in about 4 months (around 9/16/2024).          This document has been electronically signed by Cas Hernandez MD on May 16, 2024 10:19 EDT
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
## 2024-05-31 ENCOUNTER — OFFICE VISIT (OUTPATIENT)
Dept: BEHAVIORAL HEALTH | Facility: CLINIC | Age: 29
End: 2024-05-31
Payer: COMMERCIAL

## 2024-05-31 DIAGNOSIS — F33.1 MODERATE EPISODE OF RECURRENT MAJOR DEPRESSIVE DISORDER: ICD-10-CM

## 2024-05-31 DIAGNOSIS — F41.1 GENERALIZED ANXIETY DISORDER: Primary | ICD-10-CM

## 2024-05-31 RX ORDER — EPINEPHRINE 0.3 MG/.3ML
INJECTION SUBCUTANEOUS
Qty: 2 EACH | Refills: 0 | Status: SHIPPED | OUTPATIENT
Start: 2024-05-31

## 2024-06-25 ENCOUNTER — OFFICE VISIT (OUTPATIENT)
Dept: PSYCHIATRY | Facility: CLINIC | Age: 29
End: 2024-06-25
Payer: COMMERCIAL

## 2024-06-25 VITALS
DIASTOLIC BLOOD PRESSURE: 74 MMHG | BODY MASS INDEX: 16.78 KG/M2 | WEIGHT: 135 LBS | HEIGHT: 75 IN | SYSTOLIC BLOOD PRESSURE: 108 MMHG | HEART RATE: 84 BPM

## 2024-06-25 DIAGNOSIS — F33.1 MODERATE EPISODE OF RECURRENT MAJOR DEPRESSIVE DISORDER: ICD-10-CM

## 2024-06-25 DIAGNOSIS — F41.1 GAD (GENERALIZED ANXIETY DISORDER): Primary | ICD-10-CM

## 2024-06-25 PROCEDURE — 1160F RVW MEDS BY RX/DR IN RCRD: CPT | Performed by: PSYCHIATRY & NEUROLOGY

## 2024-06-25 PROCEDURE — 1159F MED LIST DOCD IN RCRD: CPT | Performed by: PSYCHIATRY & NEUROLOGY

## 2024-06-25 PROCEDURE — 99214 OFFICE O/P EST MOD 30 MIN: CPT | Performed by: PSYCHIATRY & NEUROLOGY

## 2024-06-25 PROCEDURE — 90833 PSYTX W PT W E/M 30 MIN: CPT | Performed by: PSYCHIATRY & NEUROLOGY

## 2024-06-25 RX ORDER — ESCITALOPRAM OXALATE 5 MG/1
5 TABLET ORAL DAILY
Qty: 30 TABLET | Refills: 1 | Status: SHIPPED | OUTPATIENT
Start: 2024-06-25
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
## 2024-06-28 ENCOUNTER — OFFICE VISIT (OUTPATIENT)
Dept: BEHAVIORAL HEALTH | Facility: CLINIC | Age: 29
End: 2024-06-28
Payer: COMMERCIAL

## 2024-06-28 DIAGNOSIS — F41.1 GENERALIZED ANXIETY DISORDER: Primary | ICD-10-CM

## 2024-06-28 DIAGNOSIS — F33.1 MODERATE EPISODE OF RECURRENT MAJOR DEPRESSIVE DISORDER: ICD-10-CM

## 2024-07-10 NOTE — PROGRESS NOTES
"Progress Note    Date: 07/19/2024  Time In: 3:00  Time Out: 3:56    Patient Legal Name: Giselle Hathaway  Patient Age: 29 y.o.    Mode of visit: In person  Location of provider: [address], [place] [address]  Location of patient: Office      CHIEF COMPLAINT: anxiety, depression    Subjective   History of Present Illness   Giselle is a 29 y.o. male who presents today as a follow-up for continued psychotherapy. Patient was accompanied by his mom, Saima during session. Patient reported he has been having nightmares.  Patient stated he feels his depression is decreasing but his anxiety is increasing.  Patient and Saima shared about patient's medical hx today. Patient advised it makes him angry that he has so many health concerns.  Patient disclosed a hx of SI till about 4 years ago and denies any SI, HI, or SH at this time.  Patient shared he did not realize he was depressed when he was having the SI and it bothers him that he had the thoughts. Patient stated he felt \"lighter\" after sharing about his hx of SI. Patient is voluntarily requesting to participate in outpatient therapy at [facility]. Patient rated anxiety at 10 and depression at 5 today on a 0-10 scale where 0= no symptoms.     History obtained from referring provider's note on 5/8/24:  Psychiatric History:  Diagnoses: depression, anxiety  Outpatient history: doesn't believe he's seen a psychiatrist  Inpatient history: denies  Medication trials: denies  Other treatment modalities: has been enrolled in therapy in the past  Presenting regimen: N/A  Self harm: denies  Suicide attempts: denies    Assessment    Mental Status Exam     Appearance: good hygiene and dressed appropriately for the weather  Behavior: restless  Cooperation:  engaged, cooperative, attentive, and friendly  Eye Contact:  good  Affect:  congruent  Mood: anxious  Speech: talkative  Thought Process:  linear  Thought Content: appropriate  Suicidal: " denies  Homicidal:  denies  Hallucinations:  denies  Memory:  intact  Orientation:  person, place, time, and situation  Reliability:  reliable  Insight:  good  Judgment:  good    Clinical Intervention       ICD-10-CM ICD-9-CM   1. Generalized anxiety disorder  F41.1 300.02   2. Moderate episode of recurrent major depressive disorder  F33.1 296.32        Individual psychotherapy was provided utilizing CBT and person-centered techniques to build rapport, encourage expression of thoughts and feelings, support self-esteem, review treatment objectives, assess symptoms, and gather history. Allowed patient to freely discuss issues without interruption or judgement with unconditional positive regard, active listening skills, and empathy. Therapist utilized open-ended questions to encourage the development of a positive therapeutic relationship and open communication.  Therapist normalized/validated patient’s thoughts and feelings as appropriate. Patient rated anxiety at 10 and depression at 5 today on a 0-10 scale where 0= no symptoms.    Plan   Plan & Goals     Moving forward, we will continue to build rapport and reinforce and build upon effective coping strategies utilizing CBT and person-centered techniques.    Patient acknowledged and verbally consented to continue working toward resolving current treatment plan goals and was educated on the importance of participation in the therapeutic process.  Patient will remain compliant with medication regimen as prescribed. Discuss any medication side effects, questions or concerns with prescribing provider.  Call 911 or present to the nearest emergency room in an emergency situation.  National Suicide Prevention Lifeline: Call 988. The Lifeline provides 24/7, free and confidential support for people in distress, prevention and crisis resources.  Crisis Text Line  Text HOME To 332963    Return in about 2 weeks (around 8/2/2024).    ____________________  This document has been  electronically signed by Senia Ramírez LCSW  July 19, 2024 16:54 EDT    Part of this note may be an electronic transcription/translation of spoken language to printed text using the Dragon Dictation System.

## 2024-07-19 ENCOUNTER — OFFICE VISIT (OUTPATIENT)
Dept: BEHAVIORAL HEALTH | Facility: CLINIC | Age: 29
End: 2024-07-19
Payer: COMMERCIAL

## 2024-07-19 DIAGNOSIS — F41.1 GENERALIZED ANXIETY DISORDER: Primary | ICD-10-CM

## 2024-07-19 DIAGNOSIS — F33.1 MODERATE EPISODE OF RECURRENT MAJOR DEPRESSIVE DISORDER: ICD-10-CM

## 2024-07-25 ENCOUNTER — OFFICE VISIT (OUTPATIENT)
Dept: FAMILY MEDICINE CLINIC | Facility: CLINIC | Age: 29
End: 2024-07-25
Payer: COMMERCIAL

## 2024-07-25 VITALS
WEIGHT: 137.2 LBS | SYSTOLIC BLOOD PRESSURE: 95 MMHG | HEIGHT: 75 IN | HEART RATE: 67 BPM | DIASTOLIC BLOOD PRESSURE: 70 MMHG | BODY MASS INDEX: 17.06 KG/M2 | OXYGEN SATURATION: 97 %

## 2024-07-25 DIAGNOSIS — F32.A DEPRESSION, UNSPECIFIED DEPRESSION TYPE: ICD-10-CM

## 2024-07-25 DIAGNOSIS — R53.83 OTHER FATIGUE: ICD-10-CM

## 2024-07-25 DIAGNOSIS — K31.84 GASTROPARESIS: ICD-10-CM

## 2024-07-25 DIAGNOSIS — J30.9 ALLERGIC RHINITIS, UNSPECIFIED SEASONALITY, UNSPECIFIED TRIGGER: ICD-10-CM

## 2024-07-25 DIAGNOSIS — Z13.220 SCREENING FOR LIPID DISORDERS: ICD-10-CM

## 2024-07-25 DIAGNOSIS — Z13.29 SCREENING FOR THYROID DISORDER: ICD-10-CM

## 2024-07-25 DIAGNOSIS — E55.9 VITAMIN D DEFICIENCY: ICD-10-CM

## 2024-07-25 DIAGNOSIS — G47.9 SLEEP TROUBLE: ICD-10-CM

## 2024-07-25 DIAGNOSIS — Z00.00 ANNUAL PHYSICAL EXAM: Primary | ICD-10-CM

## 2024-07-25 DIAGNOSIS — E53.8 VITAMIN B12 DEFICIENCY: ICD-10-CM

## 2024-07-25 DIAGNOSIS — F41.9 ANXIETY: ICD-10-CM

## 2024-07-25 DIAGNOSIS — E53.8 FOLIC ACID DEFICIENCY: ICD-10-CM

## 2024-07-25 PROCEDURE — 2014F MENTAL STATUS ASSESS: CPT

## 2024-07-25 PROCEDURE — 99395 PREV VISIT EST AGE 18-39: CPT

## 2024-07-25 NOTE — PROGRESS NOTES
Chief Complaint  Asthma, Anxiety, Vitamin D Deficiency, and Insomnia    SUBJECTIVE  Corentez Woodrow Hathaway presents to [facility]    History of Present Illness  Patient is a 29-year-old male who presents today for annuals physical exam and 6 month follow up on chronic conditions.  Patient needs chronic condition management of allergic rhinitis, asthma, gastroparesis, vitamin B12 deficiency, vitamin D deficiency, folate deficiency, constipation, depression.  His mom accompanies him today.      GI/Gastroparesis/constipation-  Patient is currently on Carafate, Zofran, Prilosec, Linzess.  Patient reports this is working well to control symptoms.  He declines seeing a GI at this time. He feels he can manage on his own. Repots he is eating small portions and drinking plenty of water. Reports 2-3 vomiting episodes a week.  He reports the Linzess helps greatly with his constipation. He has had some diarrhea the past week and have decreased Linzess to every other day.      Patient is on vitamin B12, vitamin D, vitamin B6, and folate replacement for deficiencies.  Patient needs refills on any medications at this time.  Patient is due lab work to assess levels.     Anxiety/Depression- he is on lexapro 5 mg. He is established with psych for med management and counseling. He has been having trouble with sleep lately. States he is anxious and has racing thoughts. He has not tried any OTC medication, he has a follow up with psych in a couple of weeks.     Raynauds- he is on amlodipine 2.5 mg to help with this. It does provide some relief. He still has occasional numbness and tingling.      Patient was seen by neurology and had EMG and nerve conduction test done that was unremarkable. Dx with Raynauds.      Allergic rhinitis- Referral to family allergy and asthma was placed at last appt. He was seen and they recommended allergy injections but he does not wish to do so.      Asthma- Patient is  established with Pulmonology  who manages this condition and his inhalers. He feels stable on current treatment regimen.      Patient is due labs. Orders placed at today's visit. Discussed with patient that these are fasting labs.      No other concerns or complaints at this time.      Past Medical History:   Diagnosis Date    Allergic rhinitis     Anxiety     Asthma 07/28/2015    Chronic bronchitis     Depression     Esophagitis, eosinophilic 08/18/2016    Fecal incontinence alternating with constipation 08/18/2016    Gastroparesis 07/28/2015    Jaundice     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 08/18/2016    Mental impairment 08/18/2016    Panic attacks     Pelvic floor dysfunction 07/28/2015    Special educational needs 03/14/2017    Speech impediment 08/18/2016    Vitamin B12 deficiency 07/18/2016    Vitamin D deficiency 08/18/2016    Weight loss       Family History   Problem Relation Age of Onset    No Known Problems Mother     No Known Problems Father     No Known Problems Sister     No Known Problems Brother     No Known Problems Maternal Aunt     No Known Problems Paternal Aunt     No Known Problems Maternal Uncle     No Known Problems Paternal Uncle     No Known Problems Maternal Grandfather     No Known Problems Maternal Grandmother     No Known Problems Paternal Grandfather     No Known Problems Paternal Grandmother     No Known Problems Cousin     No Known Problems Other     ADD / ADHD Neg Hx     Alcohol abuse Neg Hx     Anxiety disorder Neg Hx     Bipolar disorder Neg Hx     Dementia Neg Hx     Depression Neg Hx     Drug abuse Neg Hx     OCD Neg Hx     Paranoid behavior Neg Hx     Schizophrenia Neg Hx     Seizures Neg Hx     Self-Injurious Behavior  Neg Hx     Suicide Attempts Neg Hx       Past Surgical History:   Procedure Laterality Date    CHOLECYSTECTOMY          Current Outpatient Medications:     albuterol (ACCUNEB) 1.25 MG/3ML nebulizer solution, Take 3 mL by nebulization  Every 6 (Six) Hours As Needed for Wheezing or Shortness of Air., Disp: 30 mL, Rfl: 12    albuterol sulfate  (90 Base) MCG/ACT inhaler, Inhale 2 puffs Every 6 (Six) Hours As Needed for Wheezing., Disp: 1 g, Rfl: 3    amLODIPine (NORVASC) 2.5 MG tablet, Take 1 tablet by mouth Daily., Disp: 90 tablet, Rfl: 1    azelastine (ASTELIN) 0.1 % nasal spray, 2 sprays into the nostril(s) as directed by provider 2 (Two) Times a Day. Use in each nostril as directed, Disp: 30 mL, Rfl: 0    Carboxymethylcellulose Sodium (EYE DROPS OP), INSTILL 1 DROP INTO BOTH EYES 2 (TWO) TIMES A DAY, Disp: , Rfl:     cetirizine (zyrTEC) 10 MG tablet, Take 1 tablet by mouth Daily., Disp: 90 tablet, Rfl: 1    EPINEPHrine (EPIPEN) 0.3 MG/0.3ML solution auto-injector injection, INJECT 0.3ML INTO THE APPROPRIATE MUSCLE AS DIRECTED BY PRESCRIBER 1 TIME FOR 1 DOSE, Disp: 2 each, Rfl: 0    escitalopram (Lexapro) 5 MG tablet, Take 1 tablet by mouth Daily., Disp: 30 tablet, Rfl: 1    Folic Acid 5 MG capsule, Take 5 mg by mouth Daily., Disp: 30 each, Rfl: 5    ketotifen (ZADITOR) 0.025 % ophthalmic solution, , Disp: , Rfl:     linaclotide (LINZESS) 290 MCG capsule capsule, Take 1 capsule by mouth Every Morning Before Breakfast., Disp: 30 capsule, Rfl: 11    mometasone-formoterol (Dulera) 50-5 MCG/ACT inhaler, Inhale 2 puffs 2 (Two) Times a Day., Disp: 1 each, Rfl: 11    montelukast (SINGULAIR) 10 MG tablet, Take 1 tablet by mouth Every Night., Disp: 90 tablet, Rfl: 0    omeprazole (priLOSEC) 40 MG capsule, Take 1 capsule by mouth Daily. Patient has been taking PPI, Disp: 30 capsule, Rfl: 5    ondansetron ODT (ZOFRAN-ODT) 8 MG disintegrating tablet, , Disp: , Rfl:     Pyridoxine HCl (vitamin B-6) 250 MG tablet, Take 250 mg by mouth Daily., Disp: 90 tablet, Rfl: 1    Spacer/Aero-Holding Chambers (EQ Space Chamber Anti-Static) device, USE AS DIRECTED WITH HAND HELD INHALER, Disp: , Rfl:     sucralfate (Carafate) 1 g tablet, Take 1 tablet by mouth 4  "(Four) Times a Day., Disp: 120 tablet, Rfl: 5    vitamin B-12 (CYANOCOBALAMIN) 1000 MCG tablet, Take 1 tablet by mouth Daily., Disp: 30 tablet, Rfl: 5    vitamin D (ERGOCALCIFEROL) 1.25 MG (34795 UT) capsule capsule, Take 1 capsule by mouth 1 (One) Time Per Week., Disp: 13 capsule, Rfl: 0    zinc oxide (Desitin Daily Defense) 13 % cream cream, Apply 1 application  topically to the appropriate area as directed Every 1 (One) Hour As Needed (skin irritation)., Disp: 57 g, Rfl: 0    OBJECTIVE  Vital Signs:   BP 95/70 (BP Location: Left arm, Patient Position: Sitting, Cuff Size: Adult)   Pulse 67   Ht 190.5 cm (75\")   Wt 62.2 kg (137 lb 3.2 oz)   SpO2 97%   BMI 17.15 kg/m²    Estimated body mass index is 17.15 kg/m² as calculated from the following:    Height as of this encounter: 190.5 cm (75\").    Weight as of this encounter: 62.2 kg (137 lb 3.2 oz).     Wt Readings from Last 3 Encounters:   07/25/24 62.2 kg (137 lb 3.2 oz)   06/25/24 61.2 kg (135 lb)   05/16/24 63.2 kg (139 lb 6.4 oz)     BP Readings from Last 3 Encounters:   07/25/24 95/70   06/25/24 108/74   05/16/24 101/69       Physical Exam  Vitals reviewed.   Constitutional:       General: He is awake. He is not in acute distress.     Appearance: He is well-developed and well-groomed. He is not ill-appearing.   HENT:      Head: Normocephalic and atraumatic.      Right Ear: Tympanic membrane, ear canal and external ear normal.      Left Ear: Tympanic membrane, ear canal and external ear normal.      Nose: Nose normal.      Mouth/Throat:      Comments: Not assessed, he had dental work this AM, mouth is still numb  Eyes:      Extraocular Movements: Extraocular movements intact.      Conjunctiva/sclera: Conjunctivae normal.      Pupils: Pupils are equal, round, and reactive to light.   Neck:      Thyroid: No thyroid mass, thyromegaly or thyroid tenderness.   Cardiovascular:      Rate and Rhythm: Normal rate and regular rhythm.      Heart sounds: Normal heart " sounds.   Pulmonary:      Effort: Pulmonary effort is normal.      Breath sounds: Normal breath sounds.   Abdominal:      General: Abdomen is flat. Bowel sounds are normal. There is no distension.      Palpations: Abdomen is soft.      Tenderness: There is no abdominal tenderness.   Musculoskeletal:         General: Normal range of motion.      Cervical back: Normal range of motion and neck supple. No rigidity or tenderness.   Lymphadenopathy:      Cervical: No cervical adenopathy.   Skin:     General: Skin is warm and dry.   Neurological:      Mental Status: He is alert and oriented to person, place, and time.   Psychiatric:         Mood and Affect: Mood normal.         Behavior: Behavior normal. Behavior is cooperative.         Thought Content: Thought content normal.         Judgment: Judgment normal.          Result Review    Common labs          4/1/2024    10:10   Common Labs   Glucose 91    BUN 3    Creatinine 0.95    Sodium 141    Potassium 3.7    Chloride 102    Calcium 8.9    Albumin 4.1    Total Bilirubin 0.8    Alkaline Phosphatase 78    AST (SGOT) 13    ALT (SGPT) 11    WBC 7.16    Hemoglobin 12.5    Hematocrit 38.0    Platelets 250        No Images in the past 120 days found..      The above data has been reviewed by ARSH Tse 07/25/2024 09:09 EDT.          Patient Care Team:  Laly Chi APRN as PCP - General (Nurse Practitioner)  Cas Hernandez MD as Consulting Physician (Pulmonary Disease)            ASSESSMENT & PLAN    Diagnoses and all orders for this visit:    1. Annual physical exam (Primary)  Comments:  Preventative counseling  Healthy diet  Daily exercise  Adequate sleep  Orders:  -     Comprehensive Metabolic Panel; Future  -     CBC & Differential; Future  -     Lipid Panel; Future  -     TSH+Free T4; Future  -     Iron Profile; Future  -     Vitamin D 25 hydroxy; Future  -     Vitamin B12; Future  -     Folate; Future    2. Depression, unspecified depression  type  Comments:  continue lexapro 5 mg, follow up with psych for management    3. Anxiety  Comments:  continue lexapro 5 mg, follow up with psych for management    4. Allergic rhinitis, unspecified seasonality, unspecified trigger    5. Folic acid deficiency  -     Iron Profile; Future  -     Folate; Future    6. Gastroparesis  Comments:  continue linzess  Orders:  -     Iron Profile; Future    7. Vitamin B12 deficiency  -     Iron Profile; Future  -     Vitamin B12; Future    8. Vitamin D deficiency  Overview:  Poor control take Vit D 5000 IU daily and Vit D 50,000 IU weekly    Orders:  -     Vitamin D 25 hydroxy; Future    9. Screening for lipid disorders  -     Lipid Panel; Future    10. Screening for thyroid disorder  -     TSH+Free T4; Future    11. Other fatigue  -     TSH+Free T4; Future  -     Iron Profile; Future  -     Vitamin D 25 hydroxy; Future  -     Vitamin B12; Future  -     Folate; Future    12. Sleep trouble  Comments:  try OTC melatonin, follow up with psych, good sleep habits discussed         Tobacco Use: Low Risk  (7/25/2024)    Patient History     Smoking Tobacco Use: Never     Smokeless Tobacco Use: Never     Passive Exposure: Never       Follow Up     No follow-ups on file.      Patient was given instructions and counseling regarding his condition or for health maintenance advice. Please see specific information pulled into the AVS if appropriate.   I have reviewed information obtained and documented by others and I have confirmed the accuracy of this documented note.    ARSH Tse

## 2024-07-26 DIAGNOSIS — J30.9 ALLERGIC RHINITIS, UNSPECIFIED SEASONALITY, UNSPECIFIED TRIGGER: ICD-10-CM

## 2024-07-26 RX ORDER — MONTELUKAST SODIUM 10 MG/1
10 TABLET ORAL NIGHTLY
Qty: 90 TABLET | Refills: 1 | Status: SHIPPED | OUTPATIENT
Start: 2024-07-26

## 2024-08-06 ENCOUNTER — LAB (OUTPATIENT)
Dept: LAB | Facility: HOSPITAL | Age: 29
End: 2024-08-06
Payer: COMMERCIAL

## 2024-08-06 DIAGNOSIS — E55.9 VITAMIN D DEFICIENCY: ICD-10-CM

## 2024-08-06 DIAGNOSIS — Z00.00 ANNUAL PHYSICAL EXAM: ICD-10-CM

## 2024-08-06 DIAGNOSIS — Z13.29 SCREENING FOR THYROID DISORDER: ICD-10-CM

## 2024-08-06 DIAGNOSIS — Z13.220 SCREENING FOR LIPID DISORDERS: ICD-10-CM

## 2024-08-06 DIAGNOSIS — E53.8 VITAMIN B12 DEFICIENCY: ICD-10-CM

## 2024-08-06 DIAGNOSIS — E53.8 FOLIC ACID DEFICIENCY: ICD-10-CM

## 2024-08-06 DIAGNOSIS — R53.83 OTHER FATIGUE: ICD-10-CM

## 2024-08-06 DIAGNOSIS — K31.84 GASTROPARESIS: ICD-10-CM

## 2024-08-06 LAB
25(OH)D3 SERPL-MCNC: 32.5 NG/ML (ref 30–100)
ALBUMIN SERPL-MCNC: 4.2 G/DL (ref 3.5–5.2)
ALBUMIN/GLOB SERPL: 1.6 G/DL
ALP SERPL-CCNC: 86 U/L (ref 39–117)
ALT SERPL W P-5'-P-CCNC: 5 U/L (ref 1–41)
ANION GAP SERPL CALCULATED.3IONS-SCNC: 8 MMOL/L (ref 5–15)
AST SERPL-CCNC: 15 U/L (ref 1–40)
BASOPHILS # BLD AUTO: 0.03 10*3/MM3 (ref 0–0.2)
BASOPHILS NFR BLD AUTO: 0.5 % (ref 0–1.5)
BILIRUB SERPL-MCNC: 1 MG/DL (ref 0–1.2)
BUN SERPL-MCNC: 5 MG/DL (ref 6–20)
BUN/CREAT SERPL: 4.9 (ref 7–25)
CALCIUM SPEC-SCNC: 9.3 MG/DL (ref 8.6–10.5)
CHLORIDE SERPL-SCNC: 105 MMOL/L (ref 98–107)
CHOLEST SERPL-MCNC: 131 MG/DL (ref 0–200)
CO2 SERPL-SCNC: 24 MMOL/L (ref 22–29)
CREAT SERPL-MCNC: 1.03 MG/DL (ref 0.76–1.27)
DEPRECATED RDW RBC AUTO: 44 FL (ref 37–54)
EGFRCR SERPLBLD CKD-EPI 2021: 100.8 ML/MIN/1.73
EOSINOPHIL # BLD AUTO: 0.17 10*3/MM3 (ref 0–0.4)
EOSINOPHIL NFR BLD AUTO: 2.6 % (ref 0.3–6.2)
ERYTHROCYTE [DISTWIDTH] IN BLOOD BY AUTOMATED COUNT: 13.1 % (ref 12.3–15.4)
FOLATE SERPL-MCNC: <2 NG/ML (ref 4.78–24.2)
GLOBULIN UR ELPH-MCNC: 2.6 GM/DL
GLUCOSE SERPL-MCNC: 81 MG/DL (ref 65–99)
HCT VFR BLD AUTO: 37.8 % (ref 37.5–51)
HDLC SERPL-MCNC: 48 MG/DL (ref 40–60)
HGB BLD-MCNC: 12.3 G/DL (ref 13–17.7)
IMM GRANULOCYTES # BLD AUTO: 0.01 10*3/MM3 (ref 0–0.05)
IMM GRANULOCYTES NFR BLD AUTO: 0.2 % (ref 0–0.5)
IRON 24H UR-MRATE: 101 MCG/DL (ref 59–158)
IRON SATN MFR SERPL: 31 % (ref 20–50)
LDLC SERPL CALC-MCNC: 73 MG/DL (ref 0–100)
LDLC/HDLC SERPL: 1.57 {RATIO}
LYMPHOCYTES # BLD AUTO: 2.65 10*3/MM3 (ref 0.7–3.1)
LYMPHOCYTES NFR BLD AUTO: 40.8 % (ref 19.6–45.3)
MCH RBC QN AUTO: 30.2 PG (ref 26.6–33)
MCHC RBC AUTO-ENTMCNC: 32.5 G/DL (ref 31.5–35.7)
MCV RBC AUTO: 92.9 FL (ref 79–97)
MONOCYTES # BLD AUTO: 0.46 10*3/MM3 (ref 0.1–0.9)
MONOCYTES NFR BLD AUTO: 7.1 % (ref 5–12)
NEUTROPHILS NFR BLD AUTO: 3.18 10*3/MM3 (ref 1.7–7)
NEUTROPHILS NFR BLD AUTO: 48.8 % (ref 42.7–76)
NRBC BLD AUTO-RTO: 0 /100 WBC (ref 0–0.2)
PLATELET # BLD AUTO: 259 10*3/MM3 (ref 140–450)
PMV BLD AUTO: 10.6 FL (ref 6–12)
POTASSIUM SERPL-SCNC: 4.1 MMOL/L (ref 3.5–5.2)
PROT SERPL-MCNC: 6.8 G/DL (ref 6–8.5)
RBC # BLD AUTO: 4.07 10*6/MM3 (ref 4.14–5.8)
SODIUM SERPL-SCNC: 137 MMOL/L (ref 136–145)
T4 FREE SERPL-MCNC: 1.25 NG/DL (ref 0.92–1.68)
TIBC SERPL-MCNC: 328 MCG/DL (ref 298–536)
TRANSFERRIN SERPL-MCNC: 220 MG/DL (ref 200–360)
TRIGL SERPL-MCNC: 38 MG/DL (ref 0–150)
TSH SERPL DL<=0.05 MIU/L-ACNC: 2.42 UIU/ML (ref 0.27–4.2)
VIT B12 BLD-MCNC: 335 PG/ML (ref 211–946)
VLDLC SERPL-MCNC: 10 MG/DL (ref 5–40)
WBC NRBC COR # BLD AUTO: 6.5 10*3/MM3 (ref 3.4–10.8)

## 2024-08-06 PROCEDURE — 82607 VITAMIN B-12: CPT

## 2024-08-06 PROCEDURE — 82306 VITAMIN D 25 HYDROXY: CPT

## 2024-08-06 PROCEDURE — 36415 COLL VENOUS BLD VENIPUNCTURE: CPT

## 2024-08-06 PROCEDURE — 85025 COMPLETE CBC W/AUTO DIFF WBC: CPT

## 2024-08-06 PROCEDURE — 84466 ASSAY OF TRANSFERRIN: CPT

## 2024-08-06 PROCEDURE — 83540 ASSAY OF IRON: CPT

## 2024-08-06 PROCEDURE — 84439 ASSAY OF FREE THYROXINE: CPT

## 2024-08-06 PROCEDURE — 80053 COMPREHEN METABOLIC PANEL: CPT

## 2024-08-06 PROCEDURE — 80061 LIPID PANEL: CPT

## 2024-08-06 PROCEDURE — 82746 ASSAY OF FOLIC ACID SERUM: CPT

## 2024-08-06 PROCEDURE — 84443 ASSAY THYROID STIM HORMONE: CPT

## 2024-08-29 ENCOUNTER — OFFICE VISIT (OUTPATIENT)
Dept: PULMONOLOGY | Facility: CLINIC | Age: 29
End: 2024-08-29
Payer: COMMERCIAL

## 2024-08-29 ENCOUNTER — HOSPITAL ENCOUNTER (OUTPATIENT)
Dept: GENERAL RADIOLOGY | Facility: HOSPITAL | Age: 29
Discharge: HOME | End: 2024-08-29
Admitting: INTERNAL MEDICINE
Payer: COMMERCIAL

## 2024-08-29 VITALS
WEIGHT: 133.8 LBS | RESPIRATION RATE: 16 BRPM | SYSTOLIC BLOOD PRESSURE: 109 MMHG | OXYGEN SATURATION: 97 % | HEIGHT: 75 IN | DIASTOLIC BLOOD PRESSURE: 71 MMHG | BODY MASS INDEX: 16.64 KG/M2 | TEMPERATURE: 97.8 F | HEART RATE: 76 BPM

## 2024-08-29 DIAGNOSIS — J45.909 MODERATE ASTHMA, UNSPECIFIED WHETHER COMPLICATED, UNSPECIFIED WHETHER PERSISTENT: ICD-10-CM

## 2024-08-29 DIAGNOSIS — J45.909 MODERATE ASTHMA, UNSPECIFIED WHETHER COMPLICATED, UNSPECIFIED WHETHER PERSISTENT: Primary | ICD-10-CM

## 2024-08-29 PROCEDURE — 1159F MED LIST DOCD IN RCRD: CPT | Performed by: INTERNAL MEDICINE

## 2024-08-29 PROCEDURE — 71046 X-RAY EXAM CHEST 2 VIEWS: CPT

## 2024-08-29 PROCEDURE — 1160F RVW MEDS BY RX/DR IN RCRD: CPT | Performed by: INTERNAL MEDICINE

## 2024-08-29 PROCEDURE — 99214 OFFICE O/P EST MOD 30 MIN: CPT | Performed by: INTERNAL MEDICINE

## 2024-08-29 RX ORDER — ALBUTEROL SULFATE 1.25 MG/3ML
1 SOLUTION RESPIRATORY (INHALATION) EVERY 6 HOURS PRN
Qty: 30 ML | Refills: 12 | Status: SHIPPED | OUTPATIENT
Start: 2024-08-29

## 2024-08-29 RX ORDER — ALBUTEROL SULFATE 90 UG/1
2 AEROSOL, METERED RESPIRATORY (INHALATION) EVERY 6 HOURS PRN
Qty: 1 G | Refills: 3 | Status: SHIPPED | OUTPATIENT
Start: 2024-08-29

## 2024-08-29 RX ORDER — AZITHROMYCIN 250 MG/1
TABLET, FILM COATED ORAL
Qty: 6 TABLET | Refills: 0 | Status: SHIPPED | OUTPATIENT
Start: 2024-08-29

## 2024-08-29 RX ORDER — CHLORHEXIDINE GLUCONATE ORAL RINSE 1.2 MG/ML
15 SOLUTION DENTAL 2 TIMES DAILY
COMMUNITY
Start: 2024-08-18

## 2024-08-29 RX ORDER — MOMETASONE FUROATE AND FORMOTEROL FUMARATE DIHYDRATE 50; 5 UG/1; UG/1
2 AEROSOL RESPIRATORY (INHALATION)
Qty: 1 EACH | Refills: 3 | Status: SHIPPED | OUTPATIENT
Start: 2024-08-29

## 2024-08-29 NOTE — PROGRESS NOTES
Pulmonary Office Follow-up    Subjective     Giselle Hathaway is seen today at the office for   Chief Complaint   Patient presents with    Follow-up     4 MONTH    Asthma    Shortness of Breath    Cough    Wheezing         HPI  Giselle Hathaway is a 29 y.o. male with a PMH significant for bronchial asthma presents for follow-up patient has been having worsening cough and mucopurulent expectoration over the past month or so patient seems to be compliant with his medications he denies any fever chest pain or hemoptysis      Tobacco use history:  Never smoker      Patient Active Problem List   Diagnosis    Allergic rhinitis    Asthma    Chronic bronchitis    Moderate episode of recurrent major depressive disorder    Disorder of pelvis    Educational circumstance    Esophagitis, eosinophilic    Fecal incontinence alternating with constipation    Gastroparesis    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    Mental impairment    Speech disturbance    Vitamin B12 deficiency    Vitamin D deficiency    Constipation due to outlet dysfunction    Other chronic pain       Review of Systems  Review of Systems   Respiratory:  Positive for cough.    All other systems reviewed and are negative.    As described in the HPI. Otherwise, remainder of ROS (14 systems) were negative.    Medications, Allergies, Social, and Family Histories reviewed as per EMR.    Result Review :            Objective     Vitals:    08/29/24 1005   BP: 109/71   Pulse: 76   Resp: 16   Temp: 97.8 °F (36.6 °C)   SpO2: 97%         08/29/24  1005   Weight: 60.7 kg (133 lb 12.8 oz)       Physical Exam  Vitals and nursing note reviewed.   Constitutional:       Appearance: Normal appearance.   HENT:      Head: Normocephalic and atraumatic.      Nose: Nose normal.      Mouth/Throat:      Mouth: Mucous membranes are moist.      Pharynx: Oropharynx is clear.   Eyes:      Extraocular Movements: Extraocular movements intact.      Conjunctiva/sclera:  Conjunctivae normal.      Pupils: Pupils are equal, round, and reactive to light.   Cardiovascular:      Rate and Rhythm: Normal rate and regular rhythm.      Pulses: Normal pulses.      Heart sounds: Normal heart sounds.   Pulmonary:      Effort: Pulmonary effort is normal.      Breath sounds: Rhonchi present.   Abdominal:      General: Abdomen is flat. Bowel sounds are normal.      Palpations: Abdomen is soft.   Musculoskeletal:         General: Normal range of motion.      Cervical back: Normal range of motion and neck supple.   Skin:     General: Skin is warm.      Capillary Refill: Capillary refill takes 2 to 3 seconds.   Neurological:      General: No focal deficit present.      Mental Status: He is alert and oriented to person, place, and time.   Psychiatric:         Mood and Affect: Mood normal.         Behavior: Behavior normal.         No radiology results for the last 90 days.     Assessment & Plan     Diagnoses and all orders for this visit:    1. Moderate asthma, unspecified whether complicated, unspecified whether persistent (Primary)  -     albuterol sulfate  (90 Base) MCG/ACT inhaler; Inhale 2 puffs Every 6 (Six) Hours As Needed for Wheezing.  Dispense: 1 g; Refill: 3  -     albuterol (ACCUNEB) 1.25 MG/3ML nebulizer solution; Take 3 mL by nebulization Every 6 (Six) Hours As Needed for Wheezing or Shortness of Air.  Dispense: 30 mL; Refill: 12  -     mometasone-formoterol (Dulera) 50-5 MCG/ACT inhaler; Inhale 2 puffs 2 (Two) Times a Day.  Dispense: 1 each; Refill: 3  -     XR Chest 2 View; Future    Other orders  -     azithromycin (ZITHROMAX) 250 MG tablet; Take 2 by mouth today then 1 daily for 4 days  Dispense: 6 tablet; Refill: 0         Discussion/ Recommendations:   Will order chest x-ray for evaluation  Will start him on Zithromax Z-OSIEL  Dulera twice daily  Albuterol inhaler every 6 hours as needed  Continue nebulizer treatments as needed  Vaccinations discussed and recommended              Return in about 4 months (around 12/29/2024).          This document has been electronically signed by Cas Hernandez MD on August 29, 2024 10:12 EDT

## 2024-09-04 ENCOUNTER — OFFICE VISIT (OUTPATIENT)
Dept: PSYCHIATRY | Facility: CLINIC | Age: 29
End: 2024-09-04
Payer: COMMERCIAL

## 2024-09-04 VITALS
WEIGHT: 138.2 LBS | BODY MASS INDEX: 17.18 KG/M2 | DIASTOLIC BLOOD PRESSURE: 66 MMHG | HEART RATE: 71 BPM | SYSTOLIC BLOOD PRESSURE: 103 MMHG | HEIGHT: 75 IN

## 2024-09-04 DIAGNOSIS — F41.1 GAD (GENERALIZED ANXIETY DISORDER): ICD-10-CM

## 2024-09-04 DIAGNOSIS — F33.1 MODERATE EPISODE OF RECURRENT MAJOR DEPRESSIVE DISORDER: ICD-10-CM

## 2024-09-04 PROCEDURE — 1159F MED LIST DOCD IN RCRD: CPT | Performed by: PSYCHIATRY & NEUROLOGY

## 2024-09-04 PROCEDURE — 1160F RVW MEDS BY RX/DR IN RCRD: CPT | Performed by: PSYCHIATRY & NEUROLOGY

## 2024-09-04 PROCEDURE — 99214 OFFICE O/P EST MOD 30 MIN: CPT | Performed by: PSYCHIATRY & NEUROLOGY

## 2024-09-04 RX ORDER — ESCITALOPRAM OXALATE 10 MG/1
10 TABLET ORAL DAILY
Qty: 30 TABLET | Refills: 1 | Status: SHIPPED | OUTPATIENT
Start: 2024-09-04

## 2024-09-04 NOTE — TREATMENT PLAN
Multi-Disciplinary Problems (from Behavioral Health Treatment Plan)      Active Problems       Problem:  Patient Care Overview (Adult)  Start Date: 09/04/24      Problem Details: Treatment Planning for Corentez    Applicable diagnoses/problems:    - Anxiety: enhance engagement with regard to ego-syntonic items of living via routine building and disruption of inhibitory executive cognitive circuits; challenge avoidance of ego-syntonic items of living via disruption to perceived barriers; reduce salience of fears and overwhelm with regard to their effects on thinking and behavior.  - progress: minimal, plateau  - frequency of intervention: as needed  - duration of treatment: until optimized functional status is met    - Depression: enhance motivation and interest with regard to ego-syntonic items of living via routine building and disruption of inhibitory executive cognitive circuits; challenge withdrawal from ego-syntonic items of living; cultivate kenna and satisfaction via a consistent practice of appreciation; consistently practice redirection from intrusive ego-dystonic thoughts towards actionable items to reduce influence these thoughts have in emotions and behavior.  - progress: moderate, plateau  - frequency of intervention: as needed  - duration of treatment: until optimized functional status is met        Goal Priority Start Date Expected End Date End Date    Plan of Care Review -- 09/04/24 03/05/25 --
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
## 2024-09-20 ENCOUNTER — TELEPHONE (OUTPATIENT)
Dept: FAMILY MEDICINE CLINIC | Facility: CLINIC | Age: 29
End: 2024-09-20
Payer: COMMERCIAL

## 2024-10-15 DIAGNOSIS — K59.02 CONSTIPATION DUE TO OUTLET DYSFUNCTION: ICD-10-CM

## 2024-10-16 RX ORDER — LINACLOTIDE 290 UG/1
290 CAPSULE, GELATIN COATED ORAL
Qty: 30 CAPSULE | Refills: 0 | Status: SHIPPED | OUTPATIENT
Start: 2024-10-16

## 2024-10-23 ENCOUNTER — TELEPHONE (OUTPATIENT)
Dept: FAMILY MEDICINE CLINIC | Facility: CLINIC | Age: 29
End: 2024-10-23
Payer: COMMERCIAL

## 2024-10-23 ENCOUNTER — OFFICE VISIT (OUTPATIENT)
Dept: BEHAVIORAL HEALTH | Facility: CLINIC | Age: 29
End: 2024-10-23
Payer: COMMERCIAL

## 2024-10-23 DIAGNOSIS — F41.1 GENERALIZED ANXIETY DISORDER: Primary | ICD-10-CM

## 2024-10-23 DIAGNOSIS — F33.1 MODERATE EPISODE OF RECURRENT MAJOR DEPRESSIVE DISORDER: ICD-10-CM

## 2024-10-24 ENCOUNTER — OFFICE VISIT (OUTPATIENT)
Dept: PSYCHIATRY | Facility: CLINIC | Age: 29
End: 2024-10-24
Payer: COMMERCIAL

## 2024-10-24 ENCOUNTER — CLINICAL SUPPORT (OUTPATIENT)
Dept: FAMILY MEDICINE CLINIC | Facility: CLINIC | Age: 29
End: 2024-10-24
Payer: COMMERCIAL

## 2024-10-24 VITALS
DIASTOLIC BLOOD PRESSURE: 70 MMHG | HEIGHT: 75 IN | HEART RATE: 73 BPM | BODY MASS INDEX: 18.18 KG/M2 | WEIGHT: 146.2 LBS | SYSTOLIC BLOOD PRESSURE: 102 MMHG

## 2024-10-24 DIAGNOSIS — F51.04 INSOMNIA, PSYCHOPHYSIOLOGICAL: ICD-10-CM

## 2024-10-24 DIAGNOSIS — Z23 NEED FOR INFLUENZA VACCINATION: Primary | ICD-10-CM

## 2024-10-24 DIAGNOSIS — F41.1 GAD (GENERALIZED ANXIETY DISORDER): Primary | ICD-10-CM

## 2024-10-24 DIAGNOSIS — F33.1 MODERATE EPISODE OF RECURRENT MAJOR DEPRESSIVE DISORDER: ICD-10-CM

## 2024-10-24 PROCEDURE — 90656 IIV3 VACC NO PRSV 0.5 ML IM: CPT

## 2024-10-24 PROCEDURE — 90471 IMMUNIZATION ADMIN: CPT

## 2024-10-24 RX ORDER — ESCITALOPRAM OXALATE 20 MG/1
20 TABLET ORAL DAILY
Qty: 30 TABLET | Refills: 2 | Status: SHIPPED | OUTPATIENT
Start: 2024-10-24
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
## 2024-11-14 DIAGNOSIS — E53.8 VITAMIN B12 DEFICIENCY: ICD-10-CM

## 2024-11-14 DIAGNOSIS — I73.00 RAYNAUD'S DISEASE WITHOUT GANGRENE: ICD-10-CM

## 2024-11-14 RX ORDER — CYANOCOBALAMIN (VITAMIN B-12) 1000 MCG
1 TABLET, EXTENDED RELEASE ORAL DAILY
Refills: 0 | OUTPATIENT
Start: 2024-11-14

## 2024-11-14 RX ORDER — AMLODIPINE BESYLATE 2.5 MG/1
2.5 TABLET ORAL DAILY
Qty: 90 TABLET | Refills: 0 | Status: SHIPPED | OUTPATIENT
Start: 2024-11-14

## 2024-11-27 ENCOUNTER — APPOINTMENT (OUTPATIENT)
Dept: CT IMAGING | Facility: HOSPITAL | Age: 29
End: 2024-11-27
Payer: COMMERCIAL

## 2024-11-27 ENCOUNTER — TELEPHONE (OUTPATIENT)
Dept: FAMILY MEDICINE CLINIC | Facility: CLINIC | Age: 29
End: 2024-11-27
Payer: COMMERCIAL

## 2024-11-27 ENCOUNTER — HOSPITAL ENCOUNTER (EMERGENCY)
Facility: HOSPITAL | Age: 29
Discharge: HOME | End: 2024-11-27
Attending: EMERGENCY MEDICINE | Admitting: EMERGENCY MEDICINE
Payer: COMMERCIAL

## 2024-11-27 VITALS
HEART RATE: 69 BPM | TEMPERATURE: 97.8 F | DIASTOLIC BLOOD PRESSURE: 74 MMHG | RESPIRATION RATE: 16 BRPM | HEIGHT: 75 IN | OXYGEN SATURATION: 99 % | SYSTOLIC BLOOD PRESSURE: 109 MMHG | BODY MASS INDEX: 17.43 KG/M2 | WEIGHT: 140.21 LBS

## 2024-11-27 DIAGNOSIS — K59.00 CONSTIPATION, UNSPECIFIED CONSTIPATION TYPE: Primary | ICD-10-CM

## 2024-11-27 DIAGNOSIS — K56.41 FECAL IMPACTION: ICD-10-CM

## 2024-11-27 LAB
ALBUMIN SERPL-MCNC: 4.2 G/DL (ref 3.5–5.2)
ALBUMIN/GLOB SERPL: 1.2 G/DL
ALP SERPL-CCNC: 88 U/L (ref 39–117)
ALT SERPL W P-5'-P-CCNC: 22 U/L (ref 1–41)
ANION GAP SERPL CALCULATED.3IONS-SCNC: 7.7 MMOL/L (ref 5–15)
AST SERPL-CCNC: 20 U/L (ref 1–40)
BASOPHILS # BLD AUTO: 0.04 10*3/MM3 (ref 0–0.2)
BASOPHILS NFR BLD AUTO: 0.6 % (ref 0–1.5)
BILIRUB SERPL-MCNC: 0.7 MG/DL (ref 0–1.2)
BILIRUB UR QL STRIP: NEGATIVE
BUN SERPL-MCNC: 3 MG/DL (ref 6–20)
BUN/CREAT SERPL: 3.4 (ref 7–25)
CALCIUM SPEC-SCNC: 9.5 MG/DL (ref 8.6–10.5)
CHLORIDE SERPL-SCNC: 101 MMOL/L (ref 98–107)
CLARITY UR: CLEAR
CO2 SERPL-SCNC: 28.3 MMOL/L (ref 22–29)
COLOR UR: YELLOW
CREAT SERPL-MCNC: 0.88 MG/DL (ref 0.76–1.27)
DEPRECATED RDW RBC AUTO: 46.8 FL (ref 37–54)
EGFRCR SERPLBLD CKD-EPI 2021: 119.4 ML/MIN/1.73
EOSINOPHIL # BLD AUTO: 0.33 10*3/MM3 (ref 0–0.4)
EOSINOPHIL NFR BLD AUTO: 5 % (ref 0.3–6.2)
ERYTHROCYTE [DISTWIDTH] IN BLOOD BY AUTOMATED COUNT: 13.3 % (ref 12.3–15.4)
GLOBULIN UR ELPH-MCNC: 3.4 GM/DL
GLUCOSE SERPL-MCNC: 111 MG/DL (ref 65–99)
GLUCOSE UR STRIP-MCNC: NEGATIVE MG/DL
HCT VFR BLD AUTO: 39 % (ref 37.5–51)
HGB BLD-MCNC: 12.7 G/DL (ref 13–17.7)
HGB UR QL STRIP.AUTO: NEGATIVE
HOLD SPECIMEN: NORMAL
HOLD SPECIMEN: NORMAL
IMM GRANULOCYTES # BLD AUTO: 0.01 10*3/MM3 (ref 0–0.05)
IMM GRANULOCYTES NFR BLD AUTO: 0.2 % (ref 0–0.5)
KETONES UR QL STRIP: NEGATIVE
LEUKOCYTE ESTERASE UR QL STRIP.AUTO: NEGATIVE
LIPASE SERPL-CCNC: 38 U/L (ref 13–60)
LYMPHOCYTES # BLD AUTO: 2.45 10*3/MM3 (ref 0.7–3.1)
LYMPHOCYTES NFR BLD AUTO: 37.2 % (ref 19.6–45.3)
MCH RBC QN AUTO: 30.8 PG (ref 26.6–33)
MCHC RBC AUTO-ENTMCNC: 32.6 G/DL (ref 31.5–35.7)
MCV RBC AUTO: 94.7 FL (ref 79–97)
MONOCYTES # BLD AUTO: 0.42 10*3/MM3 (ref 0.1–0.9)
MONOCYTES NFR BLD AUTO: 6.4 % (ref 5–12)
NEUTROPHILS NFR BLD AUTO: 3.33 10*3/MM3 (ref 1.7–7)
NEUTROPHILS NFR BLD AUTO: 50.6 % (ref 42.7–76)
NITRITE UR QL STRIP: NEGATIVE
NRBC BLD AUTO-RTO: 0 /100 WBC (ref 0–0.2)
PH UR STRIP.AUTO: 6 [PH] (ref 5–8)
PLATELET # BLD AUTO: 271 10*3/MM3 (ref 140–450)
PMV BLD AUTO: 9.1 FL (ref 6–12)
POTASSIUM SERPL-SCNC: 4.2 MMOL/L (ref 3.5–5.2)
PROT SERPL-MCNC: 7.6 G/DL (ref 6–8.5)
PROT UR QL STRIP: NEGATIVE
RBC # BLD AUTO: 4.12 10*6/MM3 (ref 4.14–5.8)
SODIUM SERPL-SCNC: 137 MMOL/L (ref 136–145)
SP GR UR STRIP: 1.01 (ref 1–1.03)
UROBILINOGEN UR QL STRIP: NORMAL
WBC NRBC COR # BLD AUTO: 6.58 10*3/MM3 (ref 3.4–10.8)
WHOLE BLOOD HOLD COAG: NORMAL
WHOLE BLOOD HOLD SPECIMEN: NORMAL

## 2024-11-27 PROCEDURE — 25510000001 IOPAMIDOL PER 1 ML: Performed by: EMERGENCY MEDICINE

## 2024-11-27 PROCEDURE — 74177 CT ABD & PELVIS W/CONTRAST: CPT

## 2024-11-27 PROCEDURE — 81003 URINALYSIS AUTO W/O SCOPE: CPT | Performed by: EMERGENCY MEDICINE

## 2024-11-27 PROCEDURE — 85025 COMPLETE CBC W/AUTO DIFF WBC: CPT | Performed by: EMERGENCY MEDICINE

## 2024-11-27 PROCEDURE — 83690 ASSAY OF LIPASE: CPT | Performed by: EMERGENCY MEDICINE

## 2024-11-27 PROCEDURE — 99285 EMERGENCY DEPT VISIT HI MDM: CPT

## 2024-11-27 PROCEDURE — 80053 COMPREHEN METABOLIC PANEL: CPT | Performed by: EMERGENCY MEDICINE

## 2024-11-27 RX ORDER — IOPAMIDOL 755 MG/ML
100 INJECTION, SOLUTION INTRAVASCULAR
Status: COMPLETED | OUTPATIENT
Start: 2024-11-27 | End: 2024-11-27

## 2024-11-27 RX ORDER — ONDANSETRON 8 MG/1
8 TABLET, ORALLY DISINTEGRATING ORAL EVERY 8 HOURS PRN
Qty: 12 TABLET | Refills: 0 | Status: SHIPPED | OUTPATIENT
Start: 2024-11-27

## 2024-11-27 RX ORDER — SODIUM CHLORIDE 0.9 % (FLUSH) 0.9 %
10 SYRINGE (ML) INJECTION AS NEEDED
Status: DISCONTINUED | OUTPATIENT
Start: 2024-11-27 | End: 2024-11-27 | Stop reason: HOSPADM

## 2024-11-27 RX ADMIN — IOPAMIDOL 100 ML: 755 INJECTION, SOLUTION INTRAVENOUS at 12:03

## 2024-11-27 RX ADMIN — DOCUSATE SODIUM 1 ENEMA: 283 LIQUID RECTAL at 14:46

## 2024-11-27 NOTE — TELEPHONE ENCOUNTER
pt's mother called and stated that the pt was extremely constipated and in pain. We sugguested to go to ED. Consulted w Dr. Cartwright.

## 2024-11-27 NOTE — ED PROVIDER NOTES
Time: 4:04 PM EST  Date of encounter:  11/27/2024  Independent Historian/Clinical History and Information was obtained by:   Patient and mother  Chief Complaint: Constipation and abdominal pain    History is limited by: N/A    History of Present Illness:  Patient is a 29 y.o. year old male who presents to the emergency department for evaluation of abdominal pain and constipation.  Patient complains of diffuse generalized abdominal pain.  Patient describes the pain as sharp and cramping in nature.  Patient denies a history of constipation issues.  Patient also complains of constipation for the past 3 weeks.  Patient cannot recall his last regular bowel movement was.  Patient also admits to having some nausea and vomiting.    HPI    Patient Care Team  Primary Care Provider: Laly Chi APRN    Past Medical History:     Allergies   Allergen Reactions    Nuts Shortness Of Breath    Amoxicillin Itching    Egg-Derived Products Itching    Penicillins Other (See Comments)     Rash and shortness of breath  Allergic to any of penicillin family    Ventolin [Albuterol] Other (See Comments)     States he can not have ventolin but can take name brand albuterol   Unknown reaction    Hydrocortisone Rash    Ibuprofen Rash    Soybean Oil Rash    Wheat Rash     Past Medical History:   Diagnosis Date    Allergic rhinitis     Anxiety     Asthma 07/28/2015    Chronic bronchitis     Depression     Esophagitis, eosinophilic 08/18/2016    Fecal incontinence alternating with constipation 08/18/2016    Gastroparesis 07/28/2015    Jaundice     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 08/18/2016    Mental impairment 08/18/2016    Panic attacks     Pelvic floor dysfunction 07/28/2015    Special educational needs 03/14/2017    Speech impediment 08/18/2016    Vitamin B12 deficiency 07/18/2016    Vitamin D deficiency 08/18/2016    Weight loss      Past Surgical History:   Procedure Laterality Date    CHOLECYSTECTOMY       Family History    Problem Relation Age of Onset    No Known Problems Mother     No Known Problems Father     No Known Problems Sister     No Known Problems Brother     No Known Problems Maternal Aunt     No Known Problems Paternal Aunt     No Known Problems Maternal Uncle     No Known Problems Paternal Uncle     No Known Problems Maternal Grandfather     No Known Problems Maternal Grandmother     No Known Problems Paternal Grandfather     No Known Problems Paternal Grandmother     No Known Problems Cousin     No Known Problems Other     ADD / ADHD Neg Hx     Alcohol abuse Neg Hx     Anxiety disorder Neg Hx     Bipolar disorder Neg Hx     Dementia Neg Hx     Depression Neg Hx     Drug abuse Neg Hx     OCD Neg Hx     Paranoid behavior Neg Hx     Schizophrenia Neg Hx     Seizures Neg Hx     Self-Injurious Behavior  Neg Hx     Suicide Attempts Neg Hx        Home Medications:  Prior to Admission medications    Medication Sig Start Date End Date Taking? Authorizing Provider   albuterol (ACCUNEB) 1.25 MG/3ML nebulizer solution Take 3 mL by nebulization Every 6 (Six) Hours As Needed for Wheezing or Shortness of Air. 8/29/24   Cas Hernandez MD   albuterol sulfate  (90 Base) MCG/ACT inhaler Inhale 2 puffs Every 6 (Six) Hours As Needed for Wheezing. 8/29/24   Cas Hernandez MD   amLODIPine (NORVASC) 2.5 MG tablet Take 1 tablet by mouth once daily 11/14/24   Laly Chi APRN   Carboxymethylcellulose Sodium (EYE DROPS OP) INSTILL 1 DROP INTO BOTH EYES 2 (TWO) TIMES A DAY 2/5/24   Rikki Christy MD   cetirizine (zyrTEC) 10 MG tablet Take 1 tablet by mouth Daily. 4/24/24   Laly Chi APRN   chlorhexidine (PERIDEX) 0.12 % solution Apply 15 mL to the mouth or throat 2 (Two) Times a Day. 8/18/24   Rikki Christy MD   EPINEPHrine (EPIPEN) 0.3 MG/0.3ML solution auto-injector injection INJECT 0.3ML INTO THE APPROPRIATE MUSCLE AS DIRECTED BY PRESCRIBER 1 TIME FOR 1 DOSE 5/31/24   Laly Chi APRN   escitalopram  (Lexapro) 20 MG tablet Take 1 tablet by mouth Daily. 10/24/24   Aguilar Bello MD   Folic Acid 5 MG capsule Take 5 mg by mouth Daily. 4/24/24   Laly Chi APRN   ketotifen (ZADITOR) 0.025 % ophthalmic solution  10/27/23   Rikki Christy MD   Linzess 290 MCG capsule capsule TAKE 1 CAPSULE BY MOUTH ONCE DAILY IN THE MORNING BEFORE BREAKFAST 10/16/24   Laly Chi APRN   mometasone-formoterol (Dulera) 50-5 MCG/ACT inhaler Inhale 2 puffs 2 (Two) Times a Day. 8/29/24   Cas Hernandez MD   montelukast (SINGULAIR) 10 MG tablet TAKE 1 TABLET BY MOUTH ONCE DAILY AT NIGHT 7/26/24   Laly Chi APRN   omeprazole (priLOSEC) 40 MG capsule Take 1 capsule by mouth Daily. Patient has been taking PPI 4/24/24   Laly Chi APRN   ondansetron ODT (ZOFRAN-ODT) 8 MG disintegrating tablet  4/23/24   Rikki Christy MD   Pyridoxine HCl (vitamin B-6) 250 MG tablet Take 250 mg by mouth Daily. 4/10/24   Laly Chi APRN   Spacer/Aero-Holding Chambers (EQ Space Chamber Anti-Static) device USE AS DIRECTED WITH HAND HELD INHALER 8/23/23   Rikki Christy MD   sucralfate (Carafate) 1 g tablet Take 1 tablet by mouth 4 (Four) Times a Day. 4/24/24   Laly Chi APRN   vitamin B-12 (CYANOCOBALAMIN) 1000 MCG tablet Take 1 tablet by mouth Daily. 4/24/24   Laly Chi APRN   vitamin D (ERGOCALCIFEROL) 1.25 MG (75379 UT) capsule capsule Take 1 capsule by mouth 1 (One) Time Per Week. 4/24/24   Laly Chi APRN   zinc oxide (Desitin Daily Defense) 13 % cream cream Apply 1 application  topically to the appropriate area as directed Every 1 (One) Hour As Needed (skin irritation). 10/27/23   Laly Chi APRN        Social History:   Social History     Tobacco Use    Smoking status: Never     Passive exposure: Never    Smokeless tobacco: Never   Vaping Use    Vaping status: Never Used   Substance Use Topics    Alcohol use: Never    Drug use: Never         Review of Systems:  Review of Systems  "  Constitutional:  Negative for chills and fever.   HENT:  Negative for congestion, ear pain and sore throat.    Eyes:  Negative for pain.   Respiratory:  Negative for cough, chest tightness and shortness of breath.    Cardiovascular:  Negative for chest pain.   Gastrointestinal:  Positive for abdominal pain, constipation, nausea and vomiting. Negative for diarrhea.   Genitourinary:  Negative for flank pain and hematuria.   Musculoskeletal:  Negative for joint swelling.   Skin:  Negative for pallor.   Neurological:  Negative for seizures and headaches.   All other systems reviewed and are negative.       Physical Exam:  /74   Pulse 69   Temp 97.8 °F (36.6 °C) (Oral)   Resp 16   Ht 190.5 cm (75\")   Wt 63.6 kg (140 lb 3.4 oz)   SpO2 99%   BMI 17.53 kg/m²     Physical Exam    Vital signs were reviewed under triage note.  General appearance - Patient appears well-developed and well-nourished.  Patient is in no acute distress.  Head - Normocephalic, atraumatic.  Pupils - Equal, round, reactive to light.  Extraocular muscles are intact.  Conjunctiva is clear.  Nasal - Normal inspection.  No evidence of trauma or epistaxis.  Tympanic membranes - Gray, intact without erythema or retractions.  Oral mucosa - Pink and moist without lesions or erythema.  Uvula is midline.  Chest wall - Atraumatic.  Chest wall is nontender.  There are no vesicular rashes noted.  Neck - Supple.  Trachea was midline.  There is no palpable lymphadenopathy or thyromegaly.  There are no meningeal signs  Lungs - Clear to auscultation and percussion bilaterally.  Heart - Regular rate and rhythm without any murmurs, clicks, or gallops.  Abdomen - Soft.  Bowel sounds are present.  There is no palpable tenderness.  There is no rebound, guarding, or rigidity.  There are no palpable masses.  There are no pulsatile masses.  Rectal exam reveals normal sphincter tone.  There is firm stool at the end of my finger however I cannot reach any stool to " manually disimpact.  Back - Spine is straight and midline.  There is no CVA tenderness.  Extremities - Intact x4 with full range of motion.  There is no palpable edema.  Pulses are intact x4 and equal.  Neurologic - Patient is awake, alert, and oriented x3.  Cranial nerves II through XII are grossly intact.  Motor and sensory functions grossly intact.  Cerebellar function was normal.  Integument - There are no rashes.  There are no petechia or purpura lesions noted.  There are no vesicular lesions noted.           Procedures:  Procedures      Medical Decision Making:      Comorbidities that affect care:    Anxiety, depression, gastroparesis, mental impairment/special needs    External Notes reviewed:    Previous Clinic Note: Office visit with Dr. Brantley on 10/24/2024 was reviewed by me.      The following orders were placed and all results were independently analyzed by me:  Orders Placed This Encounter   Procedures    CT Abdomen Pelvis With Contrast    [place] Draw    Comprehensive Metabolic Panel    Lipase    Urinalysis With Microscopic If Indicated (No Culture) - Urine, Clean Catch    CBC Auto Differential    NPO Diet NPO Type: Strict NPO    Undress & Gown    Soap suds enema    Insert Peripheral IV    CBC & Differential    Green Top (Gel)    Lavender Top    Gold Top - SST    Light Blue Top       Medications Given in the Emergency Department:  Medications   sodium chloride 0.9 % flush 10 mL (has no administration in time range)   docusate sodium (ENEMEEZ) enema 1 enema (1 enema Rectal Given 11/27/24 1446)   iopamidol (ISOVUE-370) 76 % injection 100 mL (100 mL Intravenous Given 11/27/24 1203)        ED Course:     The patient was seen and evaluated in the ED by me.  The above history and physical examination was performed as documented.  Diagnostic data was obtained.  Results reviewed.  Findings were discussed with the patient and his mother.  There is no acute abnormalities on the ED workup.  Subsequently I began  treating the patient's constipation/fecal impaction.  Patient was given 2 enemas in the ED and did have some bowel movement however there is definitely much larger volume and he is to be evacuated.  Thus, patient was sent home with prescription for GoLytely for GI prep plans.  Patient was invited back to the ER for any change or worsening his overall condition or the GoLytely home prep does not work.  Both the patient and the mother verbalized understanding and agreement to this treatment plan.    Lab Results (last 24 hours)       Procedure Component Value Units Date/Time    CBC & Differential [[id number]]  (Abnormal) Collected: 11/27/24 0914    Specimen: Blood Updated: 11/27/24 0923    Narrative:      The following orders were created for panel order CBC & Differential.  Procedure                               Abnormality         Status                     ---------                               -----------         ------                     CBC Auto Differential[[id number]]        Abnormal            Final result                 Please view results for these tests on the individual orders.    Comprehensive Metabolic Panel [[id number]]  (Abnormal) Collected: 11/27/24 0914    Specimen: Blood Updated: 11/27/24 0939     Glucose 111 mg/dL      BUN 3 mg/dL      Creatinine 0.88 mg/dL      Sodium 137 mmol/L      Potassium 4.2 mmol/L      Chloride 101 mmol/L      CO2 28.3 mmol/L      Calcium 9.5 mg/dL      Total Protein 7.6 g/dL      Albumin 4.2 g/dL      ALT (SGPT) 22 U/L      AST (SGOT) 20 U/L      Alkaline Phosphatase 88 U/L      Total Bilirubin 0.7 mg/dL      Globulin 3.4 gm/dL      A/G Ratio 1.2 g/dL      BUN/Creatinine Ratio 3.4     Anion Gap 7.7 mmol/L      eGFR 119.4 mL/min/1.73     Narrative:      GFR Normal >60  Chronic Kidney Disease <60  Kidney Failure <15      Lipase [[id number]]  (Normal) Collected: 11/27/24 0914    Specimen: Blood Updated: 11/27/24 0939     Lipase 38 U/L     Urinalysis With Microscopic If  Indicated (No Culture) - Urine, Clean Catch [[id number]]  (Normal) Collected: 11/27/24 0914    Specimen: Urine, Clean Catch Updated: 11/27/24 0925     Color, UA Yellow     Appearance, UA Clear     pH, UA 6.0     Specific Gravity, UA 1.013     Glucose, UA Negative     Ketones, UA Negative     Bilirubin, UA Negative     Blood, UA Negative     Protein, UA Negative     Leuk Esterase, UA Negative     Nitrite, UA Negative     Urobilinogen, UA 1.0 E.U./dL    Narrative:      Urine microscopic not indicated.    CBC Auto Differential [[id number]]  (Abnormal) Collected: 11/27/24 0914    Specimen: Blood Updated: 11/27/24 0923     WBC 6.58 10*3/mm3      RBC 4.12 10*6/mm3      Hemoglobin 12.7 g/dL      Hematocrit 39.0 %      MCV 94.7 fL      MCH 30.8 pg      MCHC 32.6 g/dL      RDW 13.3 %      RDW-SD 46.8 fl      MPV 9.1 fL      Platelets 271 10*3/mm3      Neutrophil % 50.6 %      Lymphocyte % 37.2 %      Monocyte % 6.4 %      Eosinophil % 5.0 %      Basophil % 0.6 %      Immature Grans % 0.2 %      Neutrophils, Absolute 3.33 10*3/mm3      Lymphocytes, Absolute 2.45 10*3/mm3      Monocytes, Absolute 0.42 10*3/mm3      Eosinophils, Absolute 0.33 10*3/mm3      Basophils, Absolute 0.04 10*3/mm3      Immature Grans, Absolute 0.01 10*3/mm3      nRBC 0.0 /100 WBC              Imaging:    CT Abdomen Pelvis With Contrast    Result Date: 11/27/2024  CT ABDOMEN PELVIS W CONTRAST Date of Exam: 11/27/2024 11:54 AM EST Indication: Abdominal pain with vomiting and diarrhea.. Comparison: CT abdomen and pelvis 5/9/2017 Technique: Axial CT images were obtained of the abdomen and pelvis after the uneventful intravenous administration of iodinated contrast. Reconstructed coronal and sagittal images were also obtained. Automated exposure control and iterative construction methods were used. Findings: LUNG BASES:  Unremarkable without mass or infiltrate. LIVER:  Unremarkable parenchyma without focal lesion. BILIARY/GALLBLADDER: Surgically absent.  SPLEEN:  Unremarkable PANCREAS:  Unremarkable ADRENAL:  Unremarkable KIDNEYS:  Unremarkable parenchyma with no solid mass identified. No obstruction.  No calculus identified. GASTROINTESTINAL/MESENTERY:  No evidence of obstruction nor inflammation.  Normal appendix. There is a large amount of stool throughout the entire colon and rectum. The sigmoid colon is predominantly air-filled and demonstrates an inverted U configuration. However, there is no bowel wall thickening/inflammation or twisting of the mesentery. MESENTERIC VESSELS:  Patent. AORTA/IVC:  Normal caliber. RETROPERITONEUM/LYMPH NODES:  Unremarkable REPRODUCTIVE:  Unremarkable BLADDER:  Unremarkable OSSEUS STRUCTURES:  Typical for age with no acute process identified.     1. Large amount of stool throughout the colon consistent with constipation. The sigmoid colon is prominent and gas-filled with an inverted U appearance which can be seen with redundant sigmoid colon or sigmoid volvulus. However, no mesenteric edema, bowel wall thickening or definite twisting of the mesentery is noted. Correlation with patient's symptoms and physical exam findings is needed for further differentiation. GI consultation may be useful. 2. Cholecystectomy. Electronically Signed: Alma Staples MD  11/27/2024 12:23 PM EST  Workstation ID: [id number]       Differential Diagnosis and Discussion:    Abdominal Pain: Based on the patient's signs and symptoms, I considered abdominal aortic aneurysm, small bowel obstruction, pancreatitis, acute cholecystitis, acute appendecitis, peptic ulcer disease, gastritis, colitis, endocrine disorders, irritable bowel syndrome and other differential diagnosis an etiology of the patient's abdominal pain.    All labs were reviewed and interpreted by me.  CT scan radiology impression was interpreted by me.    [facility]           Patient Care Considerations:    ANTIBIOTICS: I considered prescribing antibiotics as an outpatient however no bacterial focus of  infection was found.      Consultants/Shared Management Plan:    None    Social Determinants of Health:    Patient has presented with family members who are responsible, reliable and will ensure follow up care.      Disposition and Care Coordination:    Discharged: I considered escalation of care by admitting this patient to the hospital, however there were no acute findings in the ED workup to warrant inpatient admission.    I have explained the patient´s condition, diagnoses and treatment plan based on the information available to me at this time. I have answered questions and addressed any concerns. The patient has a good  understanding of the patient´s diagnosis, condition, and treatment plan as can be expected at this point. The vital signs have been stable. The patient´s condition is stable and appropriate for discharge from the emergency department.      The patient will pursue further outpatient evaluation with the primary care physician or other designated or consulting physician as outlined in the discharge instructions. They are agreeable to this plan of care and follow-up instructions have been explained in detail. The patient has received these instructions in written format and has expressed an understanding of the discharge instructions. The patient is aware that any significant change in condition or worsening of symptoms should prompt an immediate return to this or the closest emergency department or call to 911.  I have explained discharge medications and the need for follow up with the patient/caretakers. This was also printed in the discharge instructions. Patient was discharged with the following medications and follow up:      Medication List        Changed      ondansetron ODT 8 MG disintegrating tablet  Commonly known as: ZOFRAN-ODT  Place 1 tablet on the tongue Every 8 (Eight) Hours As Needed for Nausea or Vomiting.  What changed: See the new instructions.            ASK your doctor about  these medications      PEG-3350/Electrolytes 236 g reconstituted solution  Take 4,000 mL by mouth 1 (One) Time for 1 dose. Drink 8 ounces every 10 minutes until the entire bottle has been consumed.  Ask about: Should I take this medication?               Where to Get Your Medications        These medications were sent to [facility] [place] [id number] [facility] [id number]      Hours: Monday to Friday 9 AM to 7:30 PM, Saturday 9 AM to 2 PM Phone: [phone]   ondansetron ODT 8 MG disintegrating tablet  PEG-3350/Electrolytes 236 g reconstituted solution      Laly Chi, APRN  [address] [id number] [phone]    In 1 week        Final diagnoses:   Constipation, unspecified constipation type   Fecal impaction        ED Disposition       ED Disposition   Discharge    Condition   Stable    Comment   --               This medical record created using voice recognition software.             Matt Way DO  11/29/24 [id number]

## 2024-11-27 NOTE — DISCHARGE INSTRUCTIONS
Take the GoLytely when you go home tonight or first thing in the morning.  Take as directed.    Increase your dietary fiber.  Take a daily stool softener such as MiraLAX to avoid recurrent constipation.  Increase your daily fluid intake.  Follow-up primary care provider in 1 week.  Return to the ER for change or worsening abdominal pain, repetitive vomiting, rectal bleeding, or any other concerns issues that may arise.

## 2024-12-12 ENCOUNTER — OFFICE VISIT (OUTPATIENT)
Dept: PSYCHIATRY | Facility: CLINIC | Age: 29
End: 2024-12-12
Payer: COMMERCIAL

## 2024-12-12 VITALS
BODY MASS INDEX: 18.85 KG/M2 | WEIGHT: 151.6 LBS | HEIGHT: 75 IN | HEART RATE: 74 BPM | SYSTOLIC BLOOD PRESSURE: 108 MMHG | DIASTOLIC BLOOD PRESSURE: 76 MMHG

## 2024-12-12 DIAGNOSIS — F41.1 GAD (GENERALIZED ANXIETY DISORDER): ICD-10-CM

## 2024-12-12 DIAGNOSIS — F48.9: ICD-10-CM

## 2024-12-12 DIAGNOSIS — F33.1 MODERATE EPISODE OF RECURRENT MAJOR DEPRESSIVE DISORDER: Primary | ICD-10-CM
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
## 2024-12-12 NOTE — TREATMENT PLAN
Multi-Disciplinary Problems (from Behavioral Health Treatment Plan)      Active Problems       Problem:  Patient Care Overview (Adult)  Start Date: 12/12/24      Problem Details:   Problem Details: Treatment Planning for Corentez     Applicable diagnoses/problems:     - Anxiety: enhance engagement with regard to ego-syntonic items of living via routine building and disruption of inhibitory executive cognitive circuits; challenge avoidance of ego-syntonic items of living via disruption to perceived barriers; reduce salience of fears and overwhelm with regard to their effects on thinking and behavior.  - progress: moderate, plateau  - frequency of intervention: as needed  - duration of treatment: until optimized functional status is met     - Depression: enhance motivation and interest with regard to ego-syntonic items of living via routine building and disruption of inhibitory executive cognitive circuits; challenge withdrawal from ego-syntonic items of living; cultivate kenna and satisfaction via a consistent practice of appreciation; consistently practice redirection from intrusive ego-dystonic thoughts towards actionable items to reduce influence these thoughts have in emotions and behavior.  - progress: moderate, plateau  - frequency of intervention: as needed  - duration of treatment: until optimized functional status is met        Goal Priority Start Date Expected End Date End Date    Plan of Care Review -- 12/12/24 06/12/25 --

## 2024-12-17 ENCOUNTER — OFFICE VISIT (OUTPATIENT)
Dept: PULMONOLOGY | Facility: CLINIC | Age: 29
End: 2024-12-17
Payer: COMMERCIAL

## 2024-12-17 VITALS
DIASTOLIC BLOOD PRESSURE: 75 MMHG | RESPIRATION RATE: 14 BRPM | HEIGHT: 75 IN | TEMPERATURE: 98.6 F | BODY MASS INDEX: 18.77 KG/M2 | SYSTOLIC BLOOD PRESSURE: 108 MMHG | HEART RATE: 78 BPM | WEIGHT: 151 LBS | OXYGEN SATURATION: 98 %

## 2024-12-17 DIAGNOSIS — J45.30 MILD PERSISTENT ASTHMA, UNSPECIFIED WHETHER COMPLICATED: Primary | ICD-10-CM

## 2024-12-17 PROCEDURE — 1160F RVW MEDS BY RX/DR IN RCRD: CPT | Performed by: INTERNAL MEDICINE

## 2024-12-17 PROCEDURE — 99213 OFFICE O/P EST LOW 20 MIN: CPT | Performed by: INTERNAL MEDICINE

## 2024-12-17 PROCEDURE — 1159F MED LIST DOCD IN RCRD: CPT | Performed by: INTERNAL MEDICINE

## 2024-12-17 NOTE — PROGRESS NOTES
Pulmonary Office Follow-up    Subjective     Giselle Hathaway is seen today at the office for   Chief Complaint   Patient presents with    Follow-up     4 month    Asthma    Cough    Shortness of Breath    Wheezing         [facility]  Giselle Hathaway is a 29 y.o. male with a PMH significant for bronchial asthma who presents for follow-up patient is doing much better he complains of only cough with mucoid sputum at times but denies any significant wheezing or shortness of breath      Tobacco use history:  Never smoker      Patient Active Problem List   Diagnosis    Allergic rhinitis    Asthma    Chronic bronchitis    Moderate episode of recurrent major depressive disorder    Disorder of pelvis    Educational circumstance    Esophagitis, eosinophilic    Fecal incontinence alternating with constipation    Gastroparesis    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    Mental impairment    Speech disturbance    Vitamin B12 deficiency    Vitamin D deficiency    Constipation due to outlet dysfunction    Other chronic pain    ANAIS (generalized anxiety disorder)       Review of Systems  Review of Systems   Respiratory:  Positive for cough.    All other systems reviewed and are negative.    As described in the HPI. Otherwise, remainder of ROS (14 systems) were negative.    Medications, Allergies, Social, and Family Histories reviewed as per EMR.    Result Review :            Objective     Vitals:    12/17/24 1123   BP: 108/75   Pulse: 78   Resp: 14   Temp: 98.6 °F (37 °C)   SpO2: 98%         12/17/24  1123   Weight: 68.5 kg (151 lb)       Physical Exam  Vitals and nursing note reviewed.   Constitutional:       Appearance: Normal appearance.   HENT:      Head: Normocephalic and atraumatic.      Nose: Nose normal.      Mouth/Throat:      Pharynx: Oropharynx is clear.   Eyes:      Extraocular Movements: Extraocular movements intact.      Conjunctiva/sclera: Conjunctivae normal.      Pupils: Pupils are equal,  round, and reactive to light.   Cardiovascular:      Rate and Rhythm: Normal rate and regular rhythm.      Pulses: Normal pulses.      Heart sounds: Normal heart sounds.   Pulmonary:      Effort: Pulmonary effort is normal.      Breath sounds: Normal breath sounds.   Musculoskeletal:         General: Normal range of motion.      Cervical back: Normal range of motion and neck supple.   Skin:     General: Skin is warm.      Capillary Refill: Capillary refill takes 2 to 3 seconds.   Neurological:      Mental Status: He is alert and oriented to person, place, and time.   Psychiatric:         Mood and Affect: Mood normal.         Behavior: Behavior normal.         CT Abdomen Pelvis With Contrast    Result Date: 11/27/2024  1. Large amount of stool throughout the colon consistent with constipation. The sigmoid colon is prominent and gas-filled with an inverted U appearance which can be seen with redundant sigmoid colon or sigmoid volvulus. However, no mesenteric edema, bowel wall thickening or definite twisting of the mesentery is noted. Correlation with patient's symptoms and physical exam findings is needed for further differentiation. GI consultation may be useful. 2. Cholecystectomy. Electronically Signed: Alma Staples MD  11/27/2024 12:23 PM EST  Workstation ID: [id number]      Assessment & Plan     Diagnoses and all orders for this visit:    1. Mild persistent asthma, unspecified whether complicated (Primary)         Discussion/ Recommendations:   Patient is improving  Continue present medications  Continue Symbicort inhaler every 12  Albuterol inhaler every 6 hours as needed  Vaccinations discussed and recommended    BMI is within normal parameters. No other follow-up for BMI required.        Return in about 3 months (around 3/17/2025).          This document has been electronically signed by Cas Hernandez MD on December 17, 2024 12:23 EST

## 2025-01-09 RX ORDER — ONDANSETRON 8 MG/1
8 TABLET, ORALLY DISINTEGRATING ORAL EVERY 8 HOURS PRN
Qty: 30 TABLET | Refills: 0 | Status: SHIPPED | OUTPATIENT
Start: 2025-01-09

## 2025-01-28 ENCOUNTER — LAB (OUTPATIENT)
Dept: LAB | Facility: HOSPITAL | Age: 30
End: 2025-01-28
Payer: COMMERCIAL

## 2025-01-28 ENCOUNTER — OFFICE VISIT (OUTPATIENT)
Dept: FAMILY MEDICINE CLINIC | Facility: CLINIC | Age: 30
End: 2025-01-28
Payer: COMMERCIAL

## 2025-01-28 VITALS
OXYGEN SATURATION: 97 % | HEART RATE: 110 BPM | DIASTOLIC BLOOD PRESSURE: 70 MMHG | WEIGHT: 155 LBS | SYSTOLIC BLOOD PRESSURE: 108 MMHG | HEIGHT: 75 IN | BODY MASS INDEX: 19.27 KG/M2

## 2025-01-28 DIAGNOSIS — E53.9 VITAMIN B DEFICIENCY: ICD-10-CM

## 2025-01-28 DIAGNOSIS — Z79.899 HIGH RISK MEDICATION USE: ICD-10-CM

## 2025-01-28 DIAGNOSIS — E53.8 FOLIC ACID DEFICIENCY: ICD-10-CM

## 2025-01-28 DIAGNOSIS — K31.84 GASTROPARESIS: ICD-10-CM

## 2025-01-28 DIAGNOSIS — E55.9 VITAMIN D DEFICIENCY: ICD-10-CM

## 2025-01-28 DIAGNOSIS — K59.02 CONSTIPATION DUE TO OUTLET DYSFUNCTION: ICD-10-CM

## 2025-01-28 DIAGNOSIS — I73.00 RAYNAUD'S DISEASE WITHOUT GANGRENE: Primary | ICD-10-CM

## 2025-01-28 DIAGNOSIS — I73.00 RAYNAUD'S DISEASE WITHOUT GANGRENE: ICD-10-CM

## 2025-01-28 DIAGNOSIS — M79.2 NERVE PAIN: ICD-10-CM

## 2025-01-28 DIAGNOSIS — J30.9 ALLERGIC RHINITIS, UNSPECIFIED SEASONALITY, UNSPECIFIED TRIGGER: ICD-10-CM

## 2025-01-28 DIAGNOSIS — E53.8 VITAMIN B12 DEFICIENCY: ICD-10-CM

## 2025-01-28 LAB
ALBUMIN SERPL-MCNC: 4 G/DL (ref 3.5–5.2)
ALBUMIN/GLOB SERPL: 1.4 G/DL
ALP SERPL-CCNC: 75 U/L (ref 39–117)
ALT SERPL W P-5'-P-CCNC: 9 U/L (ref 1–41)
AMPHET+METHAMPHET UR QL: NEGATIVE
AMPHETAMINE INTERNAL CONTROL: NORMAL
AMPHETAMINES UR QL: NEGATIVE
ANION GAP SERPL CALCULATED.3IONS-SCNC: 9 MMOL/L (ref 5–15)
AST SERPL-CCNC: 15 U/L (ref 1–40)
BARBITURATE INTERNAL CONTROL: NORMAL
BARBITURATES UR QL SCN: NEGATIVE
BENZODIAZ UR QL SCN: NEGATIVE
BENZODIAZEPINE INTERNAL CONTROL: NORMAL
BILIRUB SERPL-MCNC: 0.9 MG/DL (ref 0–1.2)
BUN SERPL-MCNC: 7 MG/DL (ref 6–20)
BUN/CREAT SERPL: 7.4 (ref 7–25)
BUPRENORPHINE INTERNAL CONTROL: NORMAL
BUPRENORPHINE SERPL-MCNC: NEGATIVE NG/ML
CALCIUM SPEC-SCNC: 9 MG/DL (ref 8.6–10.5)
CANNABINOIDS SERPL QL: NEGATIVE
CHLORIDE SERPL-SCNC: 104 MMOL/L (ref 98–107)
CO2 SERPL-SCNC: 23 MMOL/L (ref 22–29)
COCAINE INTERNAL CONTROL: NORMAL
COCAINE UR QL: NEGATIVE
CREAT SERPL-MCNC: 0.95 MG/DL (ref 0.76–1.27)
EGFRCR SERPLBLD CKD-EPI 2021: 111.1 ML/MIN/1.73
GLOBULIN UR ELPH-MCNC: 2.9 GM/DL
GLUCOSE SERPL-MCNC: 86 MG/DL (ref 65–99)
Lab: NORMAL
MDMA (ECSTASY) INTERNAL CONTROL: NORMAL
MDMA UR QL SCN: NEGATIVE
METHADONE INTERNAL CONTROL: NORMAL
METHADONE UR QL SCN: NEGATIVE
METHAMPHETAMINE INTERNAL CONTROL: NORMAL
MORPHINE INTERNAL CONTROL: NORMAL
MORPHINE/OPIATES SCREEN, URINE: NEGATIVE
OXYCODONE INTERNAL CONTROL: NORMAL
OXYCODONE UR QL SCN: NEGATIVE
PCP UR QL SCN: NEGATIVE
PHENCYCLIDINE INTERNAL CONTROL: NORMAL
POTASSIUM SERPL-SCNC: 3.7 MMOL/L (ref 3.5–5.2)
PROT SERPL-MCNC: 6.9 G/DL (ref 6–8.5)
SODIUM SERPL-SCNC: 136 MMOL/L (ref 136–145)
THC INTERNAL CONTROL: NORMAL

## 2025-01-28 PROCEDURE — 1160F RVW MEDS BY RX/DR IN RCRD: CPT

## 2025-01-28 PROCEDURE — 85025 COMPLETE CBC W/AUTO DIFF WBC: CPT

## 2025-01-28 PROCEDURE — 99214 OFFICE O/P EST MOD 30 MIN: CPT

## 2025-01-28 PROCEDURE — 80053 COMPREHEN METABOLIC PANEL: CPT

## 2025-01-28 PROCEDURE — 80305 DRUG TEST PRSMV DIR OPT OBS: CPT

## 2025-01-28 PROCEDURE — 1159F MED LIST DOCD IN RCRD: CPT

## 2025-01-28 PROCEDURE — 36415 COLL VENOUS BLD VENIPUNCTURE: CPT

## 2025-01-28 RX ORDER — ONDANSETRON 8 MG/1
8 TABLET, ORALLY DISINTEGRATING ORAL EVERY 8 HOURS PRN
Qty: 30 TABLET | Refills: 0 | Status: SHIPPED | OUTPATIENT
Start: 2025-01-28

## 2025-01-28 RX ORDER — ERGOCALCIFEROL 1.25 MG/1
50000 CAPSULE, LIQUID FILLED ORAL WEEKLY
Qty: 13 CAPSULE | Refills: 0 | Status: SHIPPED | OUTPATIENT
Start: 2025-01-28

## 2025-01-28 RX ORDER — MONTELUKAST SODIUM 10 MG/1
10 TABLET ORAL NIGHTLY
Qty: 90 TABLET | Refills: 1 | Status: SHIPPED | OUTPATIENT
Start: 2025-01-28

## 2025-01-28 RX ORDER — LANOLIN ALCOHOL/MO/W.PET/CERES
1000 CREAM (GRAM) TOPICAL DAILY
Qty: 30 TABLET | Refills: 5 | Status: SHIPPED | OUTPATIENT
Start: 2025-01-28

## 2025-01-28 RX ORDER — GABAPENTIN 100 MG/1
100 CAPSULE ORAL 2 TIMES DAILY PRN
Qty: 60 CAPSULE | Refills: 0 | Status: CANCELLED | OUTPATIENT
Start: 2025-01-28

## 2025-01-28 RX ORDER — OMEPRAZOLE 40 MG/1
40 CAPSULE, DELAYED RELEASE ORAL DAILY
Qty: 30 CAPSULE | Refills: 5 | Status: SHIPPED | OUTPATIENT
Start: 2025-01-28

## 2025-01-28 RX ORDER — SUCRALFATE 1 G/1
1 TABLET ORAL 4 TIMES DAILY
Qty: 120 TABLET | Refills: 5 | Status: SHIPPED | OUTPATIENT
Start: 2025-01-28

## 2025-01-28 RX ORDER — AMLODIPINE BESYLATE 2.5 MG/1
2.5 TABLET ORAL DAILY
Qty: 90 TABLET | Refills: 0 | Status: SHIPPED | OUTPATIENT
Start: 2025-01-28

## 2025-01-28 RX ORDER — GABAPENTIN 100 MG/1
100 CAPSULE ORAL 2 TIMES DAILY PRN
Qty: 60 CAPSULE | Refills: 0 | Status: SHIPPED | OUTPATIENT
Start: 2025-01-28

## 2025-01-28 RX ORDER — LACTOBACILLUS ACIDOPHILUS/PECT 30 MG-20MG
250 TABLET ORAL DAILY
Qty: 90 TABLET | Refills: 1 | Status: SHIPPED | OUTPATIENT
Start: 2025-01-28

## 2025-01-28 RX ORDER — CETIRIZINE HYDROCHLORIDE 10 MG/1
10 TABLET ORAL DAILY
Qty: 90 TABLET | Refills: 1 | Status: SHIPPED | OUTPATIENT
Start: 2025-01-28

## 2025-01-28 NOTE — PROGRESS NOTES
Chief Complaint  raynaud's disease    SUBJECTIVE  Corentez Woodrow Hathaway presents to [facility]    History of Present Illness  Patient is a 29-year-old male who presents today for 6 month follow up on chronic conditions.  Patient needs chronic condition management of allergic rhinitis, asthma, gastroparesis, vitamin B12 deficiency, vitamin D deficiency, folate deficiency, constipation, depression.  His mom accompanies him today.      GI/Gastroparesis/constipation-  Patient is currently on Carafate, Zofran, Prilosec, Linzess.  Patient reports this is working well to control symptoms.  He declines seeing a GI at this time. He feels he can manage on his own.     Patient is on vitamin B12, vitamin D, vitamin B6, and folate replacement for deficiencies.       Anxiety/Depression- he is on lexapro 5 mg. He is established with psych for med management and counseling.    Raynauds- he is on amlodipine 2.5 mg to help with this. It does provide some relief. He still has occasional numbness and tingling.      He repots chronic widespread pain everyday. It effects him being able to do much of anything., His mother has to help run his body to get rid of some of the pain so her can walk. He has a pain attack in the exam room stating sharp shooting pain with numbness and tingling of his legs into feet. They are trying to get him on disability.     Allergic rhinitis- Referral to family allergy and asthma was placed at last appt. He was seen and they recommended allergy injections but he does not wish to do so.      Asthma- Patient is established with Pulmonology  who manages this condition and his inhalers. He feels stable on current treatment regimen.      Patient is due labs. Orders placed at today's visit. Discussed with patient that these are fasting labs.      No other concerns or complaints at this time.             Past Medical History:   Diagnosis Date    Allergic rhinitis      Anxiety     Asthma 07/28/2015    Chronic bronchitis     Depression     Esophagitis, eosinophilic 08/18/2016    Fecal incontinence alternating with constipation 08/18/2016    Gastroparesis 07/28/2015    Jaundice     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 08/18/2016    Mental impairment 08/18/2016    Panic attacks     Pelvic floor dysfunction 07/28/2015    Special educational needs 03/14/2017    Speech impediment 08/18/2016    Vitamin B12 deficiency 07/18/2016    Vitamin D deficiency 08/18/2016    Weight loss       Family History   Problem Relation Age of Onset    No Known Problems Mother     No Known Problems Father     No Known Problems Sister     No Known Problems Brother     No Known Problems Maternal Aunt     No Known Problems Paternal Aunt     No Known Problems Maternal Uncle     No Known Problems Paternal Uncle     No Known Problems Maternal Grandfather     No Known Problems Maternal Grandmother     No Known Problems Paternal Grandfather     No Known Problems Paternal Grandmother     No Known Problems Cousin     No Known Problems Other     ADD / ADHD Neg Hx     Alcohol abuse Neg Hx     Anxiety disorder Neg Hx     Bipolar disorder Neg Hx     Dementia Neg Hx     Depression Neg Hx     Drug abuse Neg Hx     OCD Neg Hx     Paranoid behavior Neg Hx     Schizophrenia Neg Hx     Seizures Neg Hx     Self-Injurious Behavior  Neg Hx     Suicide Attempts Neg Hx       Past Surgical History:   Procedure Laterality Date    CHOLECYSTECTOMY          Current Outpatient Medications:     albuterol (ACCUNEB) 1.25 MG/3ML nebulizer solution, Take 3 mL by nebulization Every 6 (Six) Hours As Needed for Wheezing or Shortness of Air., Disp: 30 mL, Rfl: 12    albuterol sulfate  (90 Base) MCG/ACT inhaler, Inhale 2 puffs Every 6 (Six) Hours As Needed for Wheezing., Disp: 1 g, Rfl: 3    amLODIPine (NORVASC) 2.5 MG tablet, Take 1 tablet by mouth Daily., Disp: 90 tablet, Rfl: 0    Carboxymethylcellulose Sodium (EYE DROPS OP),  INSTILL 1 DROP INTO BOTH EYES 2 (TWO) TIMES A DAY, Disp: , Rfl:     cetirizine (zyrTEC) 10 MG tablet, Take 1 tablet by mouth Daily., Disp: 90 tablet, Rfl: 1    chlorhexidine (PERIDEX) 0.12 % solution, Apply 15 mL to the mouth or throat 2 (Two) Times a Day., Disp: , Rfl:     EPINEPHrine (EPIPEN) 0.3 MG/0.3ML solution auto-injector injection, INJECT 0.3ML INTO THE APPROPRIATE MUSCLE AS DIRECTED BY PRESCRIBER 1 TIME FOR 1 DOSE, Disp: 2 each, Rfl: 0    escitalopram (Lexapro) 20 MG tablet, Take 1 tablet by mouth Daily., Disp: 30 tablet, Rfl: 2    Folic Acid 5 MG capsule, Take 5 mg by mouth Daily., Disp: 30 each, Rfl: 5    ketotifen (ZADITOR) 0.025 % ophthalmic solution, , Disp: , Rfl:     linaclotide (Linzess) 290 MCG capsule capsule, Take 1 capsule by mouth Every Morning Before Breakfast., Disp: 30 capsule, Rfl: 5    mometasone-formoterol (Dulera) 50-5 MCG/ACT inhaler, Inhale 2 puffs 2 (Two) Times a Day., Disp: 1 each, Rfl: 3    montelukast (SINGULAIR) 10 MG tablet, Take 1 tablet by mouth Every Night., Disp: 90 tablet, Rfl: 1    omeprazole (priLOSEC) 40 MG capsule, Take 1 capsule by mouth Daily. Patient has been taking PPI, Disp: 30 capsule, Rfl: 5    ondansetron ODT (ZOFRAN-ODT) 8 MG disintegrating tablet, Take 1 tablet by mouth Every 8 (Eight) Hours As Needed for Nausea or Vomiting., Disp: 30 tablet, Rfl: 0    Pyridoxine HCl (Vitamin B6) 250 MG tablet, Take 250 mg by mouth Daily., Disp: 90 tablet, Rfl: 1    Spacer/Aero-Holding Chambers (EQ Space Chamber Anti-Static) device, USE AS DIRECTED WITH HAND HELD INHALER, Disp: , Rfl:     sucralfate (Carafate) 1 g tablet, Take 1 tablet by mouth 4 (Four) Times a Day., Disp: 120 tablet, Rfl: 5    vitamin B-12 (CYANOCOBALAMIN) 1000 MCG tablet, Take 1 tablet by mouth Daily., Disp: 30 tablet, Rfl: 5    vitamin D (ERGOCALCIFEROL) 1.25 MG (98879 UT) capsule capsule, Take 1 capsule by mouth 1 (One) Time Per Week., Disp: 13 capsule, Rfl: 0    zinc oxide (Desitin Daily Defense) 13 %  "cream cream, Apply 1 application  topically to the appropriate area as directed Every 1 (One) Hour As Needed (skin irritation)., Disp: 57 g, Rfl: 0    gabapentin (NEURONTIN) 100 MG capsule, Take 1 capsule by mouth 2 (Two) Times a Day As Needed (nerve pain)., Disp: 60 capsule, Rfl: 0    OBJECTIVE  Vital Signs:   /70 (BP Location: Left arm, Patient Position: Sitting, Cuff Size: Adult)   Pulse 110   Ht 190.5 cm (75\")   Wt 70.3 kg (155 lb)   SpO2 97%   BMI 19.37 kg/m²    Estimated body mass index is 19.37 kg/m² as calculated from the following:    Height as of this encounter: 190.5 cm (75\").    Weight as of this encounter: 70.3 kg (155 lb).     Wt Readings from Last 3 Encounters:   01/28/25 70.3 kg (155 lb)   12/24/24 59.5 kg (131 lb 3.2 oz)   12/17/24 68.5 kg (151 lb)     BP Readings from Last 3 Encounters:   01/28/25 108/70   12/24/24 103/79   12/17/24 108/75       Physical Exam  Vitals reviewed.   Constitutional:       General: He is not in acute distress.     Appearance: He is not ill-appearing.   HENT:      Head: Normocephalic and atraumatic.   Eyes:      Conjunctiva/sclera: Conjunctivae normal.   Cardiovascular:      Rate and Rhythm: Normal rate and regular rhythm.      Heart sounds: Normal heart sounds.   Pulmonary:      Effort: Pulmonary effort is normal.      Breath sounds: Normal breath sounds.   Musculoskeletal:      Cervical back: Normal range of motion.   Neurological:      Mental Status: He is alert and oriented to person, place, and time.   Psychiatric:         Mood and Affect: Mood is anxious. Affect is tearful.         Behavior: Behavior normal.         Thought Content: Thought content normal.         Judgment: Judgment normal.          Result Review    Common labs          4/1/2024    10:10 8/6/2024    09:12 11/27/2024    09:14   Common Labs   Glucose 91  81  111    BUN 3  5  3    Creatinine 0.95  1.03  0.88    Sodium 141  137  137    Potassium 3.7  4.1  4.2    Chloride 102  105  101  "   Calcium 8.9  9.3  9.5    Albumin 4.1  4.2  4.2    Total Bilirubin 0.8  1.0  0.7    Alkaline Phosphatase 78  86  88    AST (SGOT) 13  15  20    ALT (SGPT) 11  5  22    WBC 7.16  6.50  6.58    Hemoglobin 12.5  12.3  12.7    Hematocrit 38.0  37.8  39.0    Platelets 250  259  271    Total Cholesterol  131     Triglycerides  38     HDL Cholesterol  48     LDL Cholesterol   73         CT Abdomen Pelvis With Contrast    Result Date: 11/27/2024  1. Large amount of stool throughout the colon consistent with constipation. The sigmoid colon is prominent and gas-filled with an inverted U appearance which can be seen with redundant sigmoid colon or sigmoid volvulus. However, no mesenteric edema, bowel wall thickening or definite twisting of the mesentery is noted. Correlation with patient's symptoms and physical exam findings is needed for further differentiation. GI consultation may be useful. 2. Cholecystectomy. Electronically Signed: Alma Staples MD  11/27/2024 12:23 PM EST  Workstation ID: [id number]        The above data has been reviewed by ARSH Tse 01/28/2025 13:04 EST.          Patient Care Team:  Laly Chi APRN as PCP - General (Nurse Practitioner)  Cas Hernandez MD as Consulting Physician (Pulmonary Disease)    BMI is within normal parameters. No other follow-up for BMI required.       ASSESSMENT & PLAN    Diagnoses and all orders for this visit:    1. Raynaud's disease without gangrene (Primary)  Comments:  continue amlodipine, refills sent  Orders:  -     Comprehensive metabolic panel; Future  -     amLODIPine (NORVASC) 2.5 MG tablet; Take 1 tablet by mouth Daily.  Dispense: 90 tablet; Refill: 0    2. Vitamin D deficiency  Comments:  refilled vit d supplement  Overview:  Poor control take Vit D 5000 IU daily and Vit D 50,000 IU weekly    Orders:  -     vitamin D (ERGOCALCIFEROL) 1.25 MG (10594 UT) capsule capsule; Take 1 capsule by mouth 1 (One) Time Per Week.  Dispense: 13 capsule; Refill:  0    3. Vitamin B12 deficiency  Comments:  refilled cycobalamin supplement  Orders:  -     vitamin B-12 (CYANOCOBALAMIN) 1000 MCG tablet; Take 1 tablet by mouth Daily.  Dispense: 30 tablet; Refill: 5    4. Gastroparesis  Comments:  refilled zofran, linzess, prilosec, carafate, decline GI referral at this time  Orders:  -     Comprehensive metabolic panel; Future  -     sucralfate (Carafate) 1 g tablet; Take 1 tablet by mouth 4 (Four) Times a Day.  Dispense: 120 tablet; Refill: 5  -     omeprazole (priLOSEC) 40 MG capsule; Take 1 capsule by mouth Daily. Patient has been taking PPI  Dispense: 30 capsule; Refill: 5    5. Vitamin B deficiency  Comments:  increased b6 dosing as requested  Orders:  -     CBC w AUTO Differential; Future  -     Pyridoxine HCl (Vitamin B6) 250 MG tablet; Take 250 mg by mouth Daily.  Dispense: 90 tablet; Refill: 1    6. Allergic rhinitis, unspecified seasonality, unspecified trigger  Comments:  refilled zyrtec  Orders:  -     montelukast (SINGULAIR) 10 MG tablet; Take 1 tablet by mouth Every Night.  Dispense: 90 tablet; Refill: 1  -     cetirizine (zyrTEC) 10 MG tablet; Take 1 tablet by mouth Daily.  Dispense: 90 tablet; Refill: 1    7. Constipation due to outlet dysfunction  Comments:  linzess refilled  Orders:  -     linaclotide (Linzess) 290 MCG capsule capsule; Take 1 capsule by mouth Every Morning Before Breakfast.  Dispense: 30 capsule; Refill: 5    8. Folic acid deficiency  Comments:  refilled folate  Orders:  -     CBC w AUTO Differential; Future  -     Folic Acid 5 MG capsule; Take 5 mg by mouth Daily.  Dispense: 30 each; Refill: 5    9. Nerve pain  Comments:  startr gabapentin 100 mg twice daily as needed, 1 month follow up, side effects discussed  Orders:  -     gabapentin (NEURONTIN) 100 MG capsule; Take 1 capsule by mouth 2 (Two) Times a Day As Needed (nerve pain).  Dispense: 60 capsule; Refill: 0    10. High risk medication use  Comments:  UDS and CC for gabapentin  today  Orders:  -     POC 12 Panel Urine Drug Screen    Other orders  -     ondansetron ODT (ZOFRAN-ODT) 8 MG disintegrating tablet; Take 1 tablet by mouth Every 8 (Eight) Hours As Needed for Nausea or Vomiting.  Dispense: 30 tablet; Refill: 0         Tobacco Use: Low Risk  (1/28/2025)    Patient History     Smoking Tobacco Use: Never     Smokeless Tobacco Use: Never     Passive Exposure: Never       Follow Up     Return in about 1 month (around 2/28/2025) for Next scheduled follow up.      Patient was given instructions and counseling regarding his condition or for health maintenance advice. Please see specific information pulled into the AVS if appropriate.   I have reviewed information obtained and documented by others and I have confirmed the accuracy of this documented note.    ARSH Tse

## 2025-01-29 LAB
BASOPHILS # BLD AUTO: 0.03 10*3/MM3 (ref 0–0.2)
BASOPHILS NFR BLD AUTO: 0.4 % (ref 0–1.5)
DEPRECATED RDW RBC AUTO: 42.1 FL (ref 37–54)
EOSINOPHIL # BLD AUTO: 0.17 10*3/MM3 (ref 0–0.4)
EOSINOPHIL NFR BLD AUTO: 2.4 % (ref 0.3–6.2)
ERYTHROCYTE [DISTWIDTH] IN BLOOD BY AUTOMATED COUNT: 13 % (ref 12.3–15.4)
HCT VFR BLD AUTO: 36.4 % (ref 37.5–51)
HGB BLD-MCNC: 12.8 G/DL (ref 13–17.7)
IMM GRANULOCYTES # BLD AUTO: 0.02 10*3/MM3 (ref 0–0.05)
IMM GRANULOCYTES NFR BLD AUTO: 0.3 % (ref 0–0.5)
LYMPHOCYTES # BLD AUTO: 1.33 10*3/MM3 (ref 0.7–3.1)
LYMPHOCYTES NFR BLD AUTO: 18.4 % (ref 19.6–45.3)
MCH RBC QN AUTO: 31.8 PG (ref 26.6–33)
MCHC RBC AUTO-ENTMCNC: 35.2 G/DL (ref 31.5–35.7)
MCV RBC AUTO: 90.3 FL (ref 79–97)
MONOCYTES # BLD AUTO: 0.41 10*3/MM3 (ref 0.1–0.9)
MONOCYTES NFR BLD AUTO: 5.7 % (ref 5–12)
NEUTROPHILS NFR BLD AUTO: 5.25 10*3/MM3 (ref 1.7–7)
NEUTROPHILS NFR BLD AUTO: 72.8 % (ref 42.7–76)
NRBC BLD AUTO-RTO: 0 /100 WBC (ref 0–0.2)
PLATELET # BLD AUTO: 298 10*3/MM3 (ref 140–450)
PMV BLD AUTO: 9.5 FL (ref 6–12)
RBC # BLD AUTO: 4.03 10*6/MM3 (ref 4.14–5.8)
WBC NRBC COR # BLD AUTO: 7.21 10*3/MM3 (ref 3.4–10.8)

## 2025-02-28 ENCOUNTER — OFFICE VISIT (OUTPATIENT)
Dept: FAMILY MEDICINE CLINIC | Facility: CLINIC | Age: 30
End: 2025-02-28
Payer: COMMERCIAL

## 2025-02-28 VITALS
BODY MASS INDEX: 19.05 KG/M2 | WEIGHT: 153.2 LBS | OXYGEN SATURATION: 98 % | HEIGHT: 75 IN | HEART RATE: 89 BPM | DIASTOLIC BLOOD PRESSURE: 73 MMHG | SYSTOLIC BLOOD PRESSURE: 114 MMHG

## 2025-02-28 DIAGNOSIS — H10.13 ALLERGIC CONJUNCTIVITIS OF BOTH EYES: ICD-10-CM

## 2025-02-28 DIAGNOSIS — K31.84 GASTROPARESIS: ICD-10-CM

## 2025-02-28 DIAGNOSIS — J45.909 MODERATE ASTHMA, UNSPECIFIED WHETHER COMPLICATED, UNSPECIFIED WHETHER PERSISTENT: ICD-10-CM

## 2025-02-28 DIAGNOSIS — M79.2 NERVE PAIN: Primary | ICD-10-CM

## 2025-02-28 PROCEDURE — 1159F MED LIST DOCD IN RCRD: CPT

## 2025-02-28 PROCEDURE — 1160F RVW MEDS BY RX/DR IN RCRD: CPT

## 2025-02-28 PROCEDURE — 99214 OFFICE O/P EST MOD 30 MIN: CPT

## 2025-02-28 RX ORDER — GABAPENTIN 100 MG/1
100 CAPSULE ORAL 2 TIMES DAILY PRN
Qty: 60 CAPSULE | Refills: 2 | Status: SHIPPED | OUTPATIENT
Start: 2025-02-28

## 2025-02-28 RX ORDER — ALBUTEROL SULFATE 1.25 MG/3ML
1 SOLUTION RESPIRATORY (INHALATION) EVERY 6 HOURS PRN
Qty: 30 ML | Refills: 12 | Status: SHIPPED | OUTPATIENT
Start: 2025-02-28

## 2025-02-28 RX ORDER — ONDANSETRON 8 MG/1
8 TABLET, ORALLY DISINTEGRATING ORAL EVERY 8 HOURS PRN
Qty: 30 TABLET | Refills: 0 | Status: SHIPPED | OUTPATIENT
Start: 2025-02-28

## 2025-02-28 RX ORDER — KETOTIFEN FUMARATE 0.35 MG/ML
1 SOLUTION/ DROPS OPHTHALMIC 2 TIMES DAILY
Qty: 10 ML | Refills: 2 | Status: SHIPPED | OUTPATIENT
Start: 2025-02-28

## 2025-02-28 NOTE — PROGRESS NOTES
Chief Complaint  Peripheral Neuropathy    SUBJECTIVE  Corentez Woodrow Hathaway presents to [facility]    History of Present Illness  Patient is a 29-year-old male presents today for follow-up on her pain.  Patient was started on gabapentin 100 mg twice daily.  Patient denies any adverse effects of medication.  Patient reports this has been helping control pain well,  he is able to get up and be more mobile.    Patient also reports needing a few refills on medications.  .    Past Medical History:   Diagnosis Date    Allergic rhinitis     Anxiety     Asthma 07/28/2015    Chronic bronchitis     Depression     Esophagitis, eosinophilic 08/18/2016    Fecal incontinence alternating with constipation 08/18/2016    Gastroparesis 07/28/2015    Jaundice     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 08/18/2016    Mental impairment 08/18/2016    Panic attacks     Pelvic floor dysfunction 07/28/2015    Special educational needs 03/14/2017    Speech impediment 08/18/2016    Vitamin B12 deficiency 07/18/2016    Vitamin D deficiency 08/18/2016    Weight loss       Family History   Problem Relation Age of Onset    No Known Problems Mother     No Known Problems Father     No Known Problems Sister     No Known Problems Brother     No Known Problems Maternal Aunt     No Known Problems Paternal Aunt     No Known Problems Maternal Uncle     No Known Problems Paternal Uncle     No Known Problems Maternal Grandfather     No Known Problems Maternal Grandmother     No Known Problems Paternal Grandfather     No Known Problems Paternal Grandmother     No Known Problems Cousin     No Known Problems Other     ADD / ADHD Neg Hx     Alcohol abuse Neg Hx     Anxiety disorder Neg Hx     Bipolar disorder Neg Hx     Dementia Neg Hx     Depression Neg Hx     Drug abuse Neg Hx     OCD Neg Hx     Paranoid behavior Neg Hx     Schizophrenia Neg Hx     Seizures Neg Hx     Self-Injurious Behavior  Neg Hx     Suicide  Attempts Neg Hx       Past Surgical History:   Procedure Laterality Date    CHOLECYSTECTOMY          Current Outpatient Medications:     albuterol (ACCUNEB) 1.25 MG/3ML nebulizer solution, Take 3 mL by nebulization Every 6 (Six) Hours As Needed for Wheezing or Shortness of Air., Disp: 30 mL, Rfl: 12    albuterol sulfate  (90 Base) MCG/ACT inhaler, Inhale 2 puffs Every 6 (Six) Hours As Needed for Wheezing., Disp: 1 g, Rfl: 3    amLODIPine (NORVASC) 2.5 MG tablet, Take 1 tablet by mouth Daily., Disp: 90 tablet, Rfl: 0    Carboxymethylcellulose Sodium (EYE DROPS OP), INSTILL 1 DROP INTO BOTH EYES 2 (TWO) TIMES A DAY, Disp: , Rfl:     cetirizine (zyrTEC) 10 MG tablet, Take 1 tablet by mouth Daily., Disp: 90 tablet, Rfl: 1    chlorhexidine (PERIDEX) 0.12 % solution, Apply 15 mL to the mouth or throat 2 (Two) Times a Day., Disp: , Rfl:     EPINEPHrine (EPIPEN) 0.3 MG/0.3ML solution auto-injector injection, INJECT 0.3ML INTO THE APPROPRIATE MUSCLE AS DIRECTED BY PRESCRIBER 1 TIME FOR 1 DOSE, Disp: 2 each, Rfl: 0    escitalopram (Lexapro) 20 MG tablet, Take 1 tablet by mouth Daily., Disp: 30 tablet, Rfl: 2    Folic Acid 5 MG capsule, Take 5 mg by mouth Daily., Disp: 30 each, Rfl: 5    gabapentin (NEURONTIN) 100 MG capsule, Take 1 capsule by mouth 2 (Two) Times a Day As Needed (nerve pain)., Disp: 60 capsule, Rfl: 2    Ketotifen Fumarate (ZADITOR) 0.035 % solution, Administer 1 drop to both eyes 2 (Two) Times a Day., Disp: 10 mL, Rfl: 2    linaclotide (Linzess) 290 MCG capsule capsule, Take 1 capsule by mouth Every Morning Before Breakfast., Disp: 30 capsule, Rfl: 5    mometasone-formoterol (Dulera) 50-5 MCG/ACT inhaler, Inhale 2 puffs 2 (Two) Times a Day., Disp: 1 each, Rfl: 3    montelukast (SINGULAIR) 10 MG tablet, Take 1 tablet by mouth Every Night., Disp: 90 tablet, Rfl: 1    omeprazole (priLOSEC) 40 MG capsule, Take 1 capsule by mouth Daily. Patient has been taking PPI, Disp: 30 capsule, Rfl: 5    ondansetron  "ODT (ZOFRAN-ODT) 8 MG disintegrating tablet, Take 1 tablet by mouth Every 8 (Eight) Hours As Needed for Nausea or Vomiting., Disp: 30 tablet, Rfl: 0    Pyridoxine HCl (Vitamin B6) 250 MG tablet, Take 250 mg by mouth Daily., Disp: 90 tablet, Rfl: 1    Spacer/Aero-Holding Chambers (EQ Space Chamber Anti-Static) device, USE AS DIRECTED WITH HAND HELD INHALER, Disp: , Rfl:     sucralfate (Carafate) 1 g tablet, Take 1 tablet by mouth 4 (Four) Times a Day., Disp: 120 tablet, Rfl: 5    vitamin B-12 (CYANOCOBALAMIN) 1000 MCG tablet, Take 1 tablet by mouth Daily., Disp: 30 tablet, Rfl: 5    vitamin D (ERGOCALCIFEROL) 1.25 MG (93241 UT) capsule capsule, Take 1 capsule by mouth 1 (One) Time Per Week., Disp: 13 capsule, Rfl: 0    zinc oxide (Desitin Daily Defense) 13 % cream cream, Apply 1 application  topically to the appropriate area as directed Every 1 (One) Hour As Needed (skin irritation)., Disp: 57 g, Rfl: 0    OBJECTIVE  Vital Signs:   /73   Pulse 89   Ht 190.5 cm (75\")   Wt 69.5 kg (153 lb 3.2 oz)   SpO2 98%   BMI 19.15 kg/m²    Estimated body mass index is 19.15 kg/m² as calculated from the following:    Height as of this encounter: 190.5 cm (75\").    Weight as of this encounter: 69.5 kg (153 lb 3.2 oz).     Wt Readings from Last 3 Encounters:   02/28/25 69.5 kg (153 lb 3.2 oz)   01/28/25 70.3 kg (155 lb)   12/24/24 59.5 kg (131 lb 3.2 oz)     BP Readings from Last 3 Encounters:   02/28/25 114/73   01/28/25 108/70   12/24/24 103/79       Physical Exam  Vitals reviewed.   Constitutional:       General: He is not in acute distress.     Appearance: He is not ill-appearing.   HENT:      Head: Normocephalic and atraumatic.   Eyes:      Conjunctiva/sclera: Conjunctivae normal.   Cardiovascular:      Rate and Rhythm: Normal rate and regular rhythm.      Heart sounds: Normal heart sounds.   Pulmonary:      Effort: Pulmonary effort is normal.      Breath sounds: Normal breath sounds.   Musculoskeletal:      Cervical " back: Normal range of motion.   Neurological:      Mental Status: He is alert and oriented to person, place, and time.   Psychiatric:         Mood and Affect: Mood normal.         Behavior: Behavior normal.         Thought Content: Thought content normal.         Judgment: Judgment normal.          Result Review    Common labs          8/6/2024    09:12 11/27/2024    09:14 1/28/2025    14:42   Common Labs   Glucose 81  111  86    BUN 5  3  7    Creatinine 1.03  0.88  0.95    Sodium 137  137  136    Potassium 4.1  4.2  3.7    Chloride 105  101  104    Calcium 9.3  9.5  9.0    Albumin 4.2  4.2  4.0    Total Bilirubin 1.0  0.7  0.9    Alkaline Phosphatase 86  88  75    AST (SGOT) 15  20  15    ALT (SGPT) 5  22  9    WBC 6.50  6.58  7.21    Hemoglobin 12.3  12.7  12.8    Hematocrit 37.8  39.0  36.4    Platelets 259  271  298    Total Cholesterol 131      Triglycerides 38      HDL Cholesterol 48      LDL Cholesterol  73          CT Abdomen Pelvis With Contrast    Result Date: 11/27/2024  1. Large amount of stool throughout the colon consistent with constipation. The sigmoid colon is prominent and gas-filled with an inverted U appearance which can be seen with redundant sigmoid colon or sigmoid volvulus. However, no mesenteric edema, bowel wall thickening or definite twisting of the mesentery is noted. Correlation with patient's symptoms and physical exam findings is needed for further differentiation. GI consultation may be useful. 2. Cholecystectomy. Electronically Signed: Alma Staples MD  11/27/2024 12:23 PM EST  Workstation ID: [id number]        The above data has been reviewed by ARSH Tse 02/28/2025 11:53 EST.          Patient Care Team:  Laly Chi APRN as PCP - General (Nurse Practitioner)  Cas Hernandez MD as Consulting Physician (Pulmonary Disease)    BMI is within normal parameters. No other follow-up for BMI required.       ASSESSMENT & PLAN    Diagnoses and all orders for this visit:    1.  Nerve pain (Primary)  Comments:  doing well on gabapentin 100 mg twice daily as needed, 3 month follow up  Orders:  -     gabapentin (NEURONTIN) 100 MG capsule; Take 1 capsule by mouth 2 (Two) Times a Day As Needed (nerve pain).  Dispense: 60 capsule; Refill: 2    2. Moderate asthma, unspecified whether complicated, unspecified whether persistent  Comments:  refilled neb solution  Orders:  -     albuterol (ACCUNEB) 1.25 MG/3ML nebulizer solution; Take 3 mL by nebulization Every 6 (Six) Hours As Needed for Wheezing or Shortness of Air.  Dispense: 30 mL; Refill: 12    3. Gastroparesis  Comments:  refilled zofran, doing well on current meds  Orders:  -     ondansetron ODT (ZOFRAN-ODT) 8 MG disintegrating tablet; Take 1 tablet by mouth Every 8 (Eight) Hours As Needed for Nausea or Vomiting.  Dispense: 30 tablet; Refill: 0    4. Allergic conjunctivitis of both eyes  Comments:  refilled eye drops, doing well on current regimen  Orders:  -     Ketotifen Fumarate (ZADITOR) 0.035 % solution; Administer 1 drop to both eyes 2 (Two) Times a Day.  Dispense: 10 mL; Refill: 2         Tobacco Use: Low Risk  (2/28/2025)    Patient History     Smoking Tobacco Use: Never     Smokeless Tobacco Use: Never     Passive Exposure: Never       Follow Up     Return in about 3 months (around 5/28/2025) for Next scheduled follow up.      Patient was given instructions and counseling regarding his condition or for health maintenance advice. Please see specific information pulled into the AVS if appropriate.   I have reviewed information obtained and documented by others and I have confirmed the accuracy of this documented note.    ARSH Tse

## 2025-04-03 ENCOUNTER — OFFICE VISIT (OUTPATIENT)
Dept: PSYCHIATRY | Facility: CLINIC | Age: 30
End: 2025-04-03
Payer: COMMERCIAL

## 2025-04-03 VITALS
HEART RATE: 81 BPM | SYSTOLIC BLOOD PRESSURE: 104 MMHG | DIASTOLIC BLOOD PRESSURE: 75 MMHG | WEIGHT: 157 LBS | BODY MASS INDEX: 19.52 KG/M2 | HEIGHT: 75 IN

## 2025-04-03 DIAGNOSIS — F33.41 MAJOR DEPRESSIVE DISORDER, RECURRENT EPISODE, IN PARTIAL REMISSION: ICD-10-CM

## 2025-04-03 DIAGNOSIS — F41.1 GAD (GENERALIZED ANXIETY DISORDER): Primary | ICD-10-CM

## 2025-04-03 DIAGNOSIS — F48.9: ICD-10-CM

## 2025-04-03 DIAGNOSIS — F33.1 MODERATE EPISODE OF RECURRENT MAJOR DEPRESSIVE DISORDER: ICD-10-CM

## 2025-04-03 RX ORDER — ESCITALOPRAM OXALATE 20 MG/1
20 TABLET ORAL DAILY
Qty: 90 TABLET | Refills: 0 | Status: SHIPPED | OUTPATIENT
Start: 2025-04-03

## 2025-04-03 NOTE — TREATMENT PLAN
Multi-Disciplinary Problems (from Behavioral Health Treatment Plan)      Active Problems       Problem:  Patient Care Overview (Adult)  Start Date: 04/03/25      Problem Details: Treatment Planning for Corentez    Applicable diagnoses/problems:    - Anxiety: enhance engagement with regard to ego-syntonic items of living via routine building and disruption of inhibitory executive cognitive circuits; challenge avoidance of ego-syntonic items of living via disruption to perceived barriers; reduce salience of fears and overwhelm with regard to their effects on thinking and behavior.  - progress: partial, plateau  - frequency of intervention: 4-8 weeks as scheduling allows  - duration of treatment: until optimized functional status is met    - Depression: enhance motivation and interest with regard to ego-syntonic items of living via routine building and disruption of inhibitory executive cognitive circuits; challenge withdrawal from ego-syntonic items of living; cultivate kenna and satisfaction via a consistent practice of appreciation; consistently practice redirection from intrusive ego-dystonic thoughts towards actionable items to reduce influence these thoughts have in emotions and behavior.  - progress: partial, plateau  - frequency of intervention: 4-8 weeks as scheduling allows  - duration of treatment: until optimized functional status is met        Goal Priority Start Date Expected End Date End Date    Plan of Care Review -- 04/03/25 10/02/25 --
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
## 2025-04-03 NOTE — PLAN OF CARE
Please see individual notes associated with unique patient encounters for more specific information with regard to the patient's status and progress towards treatment goals.t

## 2025-04-08 NOTE — PROGRESS NOTES
Primary Care Provider  Laly Chi APRN   Referring Provider  No ref. provider found      Patient Complaint  Allergic Rhinitis, Asthma, Follow-up (4 month), Cough (Yellow to clear phlegm), and Chest Tightness      Subjective          Giselle Hathaway presents to [facility] PULMONARY [facility]ICAL CARE MEDICINE    Patient or patient representative verbalized consent for the use of Ambient Listening during the visit with  ARSH Olivarez for chart documentation. 4/11/2025  13:09 EDT    History of Presenting Illness  Giselle Hathaway is a 30 y.o. male patient of Dr. Hernandez with asthma, eosinophilia, elevated IgE, and environmental allergies, here for 4 month follow-up.    History of Present Illness  The patient presents for evaluation of asthma. He is accompanied by his mother. He reports a general improvement in his respiratory condition, with the exception of the past month when he has been having an exacerbation characterized by phlegm production. The sputum is clear/yellow.  He also experiences intermittent wheezing, predominantly at night. He has gained a little weight since his last visit and is actively trying to gain more. He has not required any recent antibiotic or steroid therapy for his lungs recently. His current medication regimen includes daily Singulair and Zyrtec, which he finds beneficial for his allergies. He also uses Dulera inhaler twice daily, which effectively manages his asthma symptoms. He reserves the use of his nebulizer for severe episodes. He has a history of allergy injections but stopped after the doctor left.  He has never smoked.  Patient denies any hemoptysis, swollen lymph nodes, weight loss, or night sweats.  Overall, patient is doing well and has no additional concerns at this time.  Patient is able to perform ADLs without difficulty.  I have personally reviewed the review of systems, past family, social, medical and surgical histories; and agree  with their findings.    Pulmonary Meds  Dulera  Albuterol inhaler  Albuterol nebs  Singulair  Zyrtec    Pulmonary Equipment  None      Family History   Problem Relation Age of Onset    No Known Problems Mother     No Known Problems Father     No Known Problems Sister     No Known Problems Brother     No Known Problems Maternal Aunt     No Known Problems Paternal Aunt     No Known Problems Maternal Uncle     No Known Problems Paternal Uncle     No Known Problems Maternal Grandfather     No Known Problems Maternal Grandmother     No Known Problems Paternal Grandfather     No Known Problems Paternal Grandmother     No Known Problems Cousin     No Known Problems Other     ADD / ADHD Neg Hx     Alcohol abuse Neg Hx     Anxiety disorder Neg Hx     Bipolar disorder Neg Hx     Dementia Neg Hx     Depression Neg Hx     Drug abuse Neg Hx     OCD Neg Hx     Paranoid behavior Neg Hx     Schizophrenia Neg Hx     Seizures Neg Hx     Self-Injurious Behavior  Neg Hx     Suicide Attempts Neg Hx         Social History     Socioeconomic History    Marital status: Single   Tobacco Use    Smoking status: Never     Passive exposure: Never    Smokeless tobacco: Never   Vaping Use    Vaping status: Never Used   Substance and Sexual Activity    Alcohol use: Never    Drug use: Never    Sexual activity: Defer        Past Medical History:   Diagnosis Date    Allergic rhinitis     Anxiety     Asthma 07/28/2015    Chronic bronchitis     Depression     Esophagitis, eosinophilic 08/18/2016    Fecal incontinence alternating with constipation 08/18/2016    Gastroparesis 07/28/2015    Jaundice     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 08/18/2016    Mental impairment 08/18/2016    Panic attacks     Pelvic floor dysfunction 07/28/2015    Special educational needs 03/14/2017    Speech impediment 08/18/2016    Vitamin B12 deficiency 07/18/2016    Vitamin D deficiency 08/18/2016    Weight loss         Immunization History   Administered Date(s)  Administered    COVID-19 (PFIZER) 12YRS+ (COMIRNATY) 09/18/2024    COVID-19 (PFIZER) BIVALENT 12+YRS 07/25/2023    COVID-19 (PFIZER) Purple Cap Monovalent 03/18/2021, 04/12/2021, 11/16/2021    DTP / HiB 04/12/1996    Flu Vaccine Intradermal Quad 18-64YR 11/12/2021    Fluzone  >6mos 10/24/2024    Fluzone (or Fluarix & Flulaval for VFC) >6mos 10/11/2022    Fluzone Quad >6mos (Multi-dose) 12/05/2019    Hep B, Adolescent or Pediatric 04/12/1996    Influenza Injectable Mdck Pf Quad 11/12/2021, 10/03/2023    MCV4 Unspecified 10/06/2008    MMR 10/14/1996    Meningococcal Conjugate 10/06/2008    OPV 04/12/1996    Tdap 07/01/2021, 12/08/2023    Varicella 10/06/2008       Allergies   Allergen Reactions    Nuts Shortness Of Breath    Penicillins Other (See Comments)     Rash and shortness of breath  Allergic to any of penicillin family    Amoxicillin Itching    Egg-Derived Products Itching    Hydrocortisone Rash    Ibuprofen Rash    Soybean Oil Rash    Ventolin [Albuterol] Other (See Comments)     States he can not have ventolin but can take name brand albuterol   Unknown reaction    Wheat Rash          Current Outpatient Medications:     albuterol (ACCUNEB) 1.25 MG/3ML nebulizer solution, Take 3 mL by nebulization Every 6 (Six) Hours As Needed for Wheezing or Shortness of Air., Disp: 30 mL, Rfl: 12    albuterol sulfate  (90 Base) MCG/ACT inhaler, Inhale 2 puffs Every 6 (Six) Hours As Needed for Wheezing., Disp: 1 g, Rfl: 3    amLODIPine (NORVASC) 2.5 MG tablet, Take 1 tablet by mouth Daily., Disp: 90 tablet, Rfl: 0    Carboxymethylcellulose Sodium (EYE DROPS OP), INSTILL 1 DROP INTO BOTH EYES 2 (TWO) TIMES A DAY, Disp: , Rfl:     cetirizine (zyrTEC) 10 MG tablet, Take 1 tablet by mouth Daily., Disp: 90 tablet, Rfl: 1    chlorhexidine (PERIDEX) 0.12 % solution, Apply 15 mL to the mouth or throat 2 (Two) Times a Day., Disp: , Rfl:     escitalopram (Lexapro) 20 MG tablet, Take 1 tablet by mouth Daily., Disp: 90 tablet,  Rfl: 0    Folic Acid 5 MG capsule, Take 5 mg by mouth Daily., Disp: 30 each, Rfl: 5    gabapentin (NEURONTIN) 100 MG capsule, Take 1 capsule by mouth 2 (Two) Times a Day As Needed (nerve pain)., Disp: 60 capsule, Rfl: 2    Ketotifen Fumarate (ZADITOR) 0.035 % solution, Administer 1 drop to both eyes 2 (Two) Times a Day., Disp: 10 mL, Rfl: 2    linaclotide (Linzess) 290 MCG capsule capsule, Take 1 capsule by mouth Every Morning Before Breakfast., Disp: 30 capsule, Rfl: 5    mometasone-formoterol (Dulera) 50-5 MCG/ACT inhaler, Inhale 2 puffs 2 (Two) Times a Day., Disp: 1 each, Rfl: 3    montelukast (SINGULAIR) 10 MG tablet, Take 1 tablet by mouth Every Night., Disp: 90 tablet, Rfl: 1    omeprazole (priLOSEC) 40 MG capsule, Take 1 capsule by mouth Daily. Patient has been taking PPI, Disp: 30 capsule, Rfl: 5    ondansetron ODT (ZOFRAN-ODT) 8 MG disintegrating tablet, Take 1 tablet by mouth Every 8 (Eight) Hours As Needed for Nausea or Vomiting., Disp: 30 tablet, Rfl: 0    Pyridoxine HCl (Vitamin B6) 250 MG tablet, Take 250 mg by mouth Daily., Disp: 90 tablet, Rfl: 1    Spacer/Aero-Holding Chambers (EQ Space Chamber Anti-Static) device, USE AS DIRECTED WITH HAND HELD INHALER, Disp: , Rfl:     sucralfate (Carafate) 1 g tablet, Take 1 tablet by mouth 4 (Four) Times a Day., Disp: 120 tablet, Rfl: 5    vitamin B-12 (CYANOCOBALAMIN) 1000 MCG tablet, Take 1 tablet by mouth Daily., Disp: 30 tablet, Rfl: 5    vitamin D (ERGOCALCIFEROL) 1.25 MG (55866 UT) capsule capsule, Take 1 capsule by mouth 1 (One) Time Per Week., Disp: 13 capsule, Rfl: 0    zinc oxide (Desitin Daily Defense) 13 % cream cream, Apply 1 application  topically to the appropriate area as directed Every 1 (One) Hour As Needed (skin irritation)., Disp: 57 g, Rfl: 0    EPINEPHrine (EPIPEN) 0.3 MG/0.3ML solution auto-injector injection, INJECT 0.3ML INTO THE APPROPRIATE MUSCLE AS DIRECTED BY PRESCRIBER 1 TIME FOR 1 DOSE (Patient not taking: Reported on 4/11/2025),  "Disp: 2 each, Rfl: 0    predniSONE (DELTASONE) 10 MG tablet, Take 4 tabs daily x 3 days, then take 3 tabs daily x 3 days, then take 2 tabs daily x 3 days, then take 1 tab daily x 3 days, Disp: 31 tablet, Rfl: 0     Objective     Vital Signs:   /75 (BP Location: Left arm, Patient Position: Sitting, Cuff Size: Adult)   Pulse 85   Temp 98.6 °F (37 °C) (Tympanic)   Resp 16   Ht 190.5 cm (75\")   Wt 70.9 kg (156 lb 6.4 oz)   SpO2 98% Comment: room air  BMI 19.55 kg/m²     Physical Exam  Constitutional:       General: He is not in acute distress.     Appearance: Normal appearance. He is normal weight. He is not ill-appearing.   HENT:      Right Ear: Tympanic membrane and ear canal normal.      Left Ear: Tympanic membrane and ear canal normal.      Nose: Nose normal.      Mouth/Throat:      Mouth: Mucous membranes are moist.      Pharynx: Oropharynx is clear.   Cardiovascular:      Rate and Rhythm: Normal rate and regular rhythm.      Pulses: Normal pulses.      Heart sounds: Normal heart sounds.   Pulmonary:      Effort: Pulmonary effort is normal. No respiratory distress.      Breath sounds: Normal breath sounds. No stridor. No wheezing, rhonchi or rales.   Musculoskeletal:         General: No swelling. Normal range of motion.      Cervical back: Normal range of motion and neck supple.      Right lower leg: No edema.      Left lower leg: No edema.   Skin:     General: Skin is warm and dry.   Neurological:      General: No focal deficit present.      Mental Status: He is alert and oriented to person, place, and time.      Motor: No weakness.   Psychiatric:         Mood and Affect: Mood normal.         Behavior: Behavior normal.         Result Review :   I have personally reviewed patient's labs and images.  I also reviewed Dr. Hernandez's last progress note 12/17/2024.    Results    -PFTs 10/30/2023 FEV1 76% of predicted, no significant response of FVC or FEV1 to bronchodilator, though there was good response in " the small airways.  Lung volumes decreased, TLC 66% of predicted.  DLCO severely reduced 43% predicted.  -CXR 8/29/2024 showed no acute cardiopulmonary abnormalities  -Eosinophilia most recently 170 on 1/28/2025  -IgE very elevated 1462 on 8/19/2019            Diagnoses and all orders for this visit:    1. Mild persistent asthma, unspecified whether complicated (Primary)  -     predniSONE (DELTASONE) 10 MG tablet; Take 4 tabs daily x 3 days, then take 3 tabs daily x 3 days, then take 2 tabs daily x 3 days, then take 1 tab daily x 3 days  Dispense: 31 tablet; Refill: 0    2. Elevated IgE level    3. Peripheral eosinophilia    4. Environmental allergies      Assessment & Plan  1. Asthma.  His asthma symptoms have worsened over the past month, likely due to significant allergies. He reports increased phlegm production and nighttime wheezing. A prescription for prednisone has been issued to manage the current flare-up. The treatment plan involves a 12-day course of prednisone, starting with a higher dose and gradually tapering down. He is advised to continue using his Dulera inhaler daily and albuterol as needed. Nebulizer treatments are recommended for severe symptoms.    2. Allergies.  Patient has significant allergies with elevated IgE and eosinophilia in the past.  He continues to take Singulair, Zyrtec.  He has done allergy shots in the past.  He declines a referral to allergist at this time, will consider this going forward.    Follow-up  The patient will follow up in 3 months.    Smoking status: Reviewed  Vaccination status: Patient reports he is up-to-date with his flu, pneumonia, and Covid vaccines.  Patient is advised to continue to follow CDC recommendations such as social distancing wearing a mask and washing hands for at least 20 seconds.  Medications personally reviewed    Follow Up   No follow-ups on file.  Patient was given instructions and counseling regarding his condition or for health maintenance  advice. Please see specific information pulled into the AVS if appropriate.

## 2025-04-11 ENCOUNTER — OFFICE VISIT (OUTPATIENT)
Dept: PULMONOLOGY | Facility: CLINIC | Age: 30
End: 2025-04-11
Payer: COMMERCIAL

## 2025-04-11 VITALS
HEART RATE: 85 BPM | TEMPERATURE: 98.6 F | BODY MASS INDEX: 19.45 KG/M2 | WEIGHT: 156.4 LBS | HEIGHT: 75 IN | OXYGEN SATURATION: 98 % | DIASTOLIC BLOOD PRESSURE: 75 MMHG | RESPIRATION RATE: 16 BRPM | SYSTOLIC BLOOD PRESSURE: 102 MMHG

## 2025-04-11 DIAGNOSIS — Z91.09 ENVIRONMENTAL ALLERGIES: ICD-10-CM

## 2025-04-11 DIAGNOSIS — D72.19 PERIPHERAL EOSINOPHILIA: ICD-10-CM

## 2025-04-11 DIAGNOSIS — J45.30 MILD PERSISTENT ASTHMA, UNSPECIFIED WHETHER COMPLICATED: Primary | ICD-10-CM

## 2025-04-11 DIAGNOSIS — R76.8 ELEVATED IGE LEVEL: ICD-10-CM

## 2025-04-11 PROCEDURE — 99214 OFFICE O/P EST MOD 30 MIN: CPT

## 2025-04-11 PROCEDURE — 1159F MED LIST DOCD IN RCRD: CPT

## 2025-04-11 PROCEDURE — 1160F RVW MEDS BY RX/DR IN RCRD: CPT

## 2025-04-11 RX ORDER — PREDNISONE 10 MG/1
TABLET ORAL
Qty: 31 TABLET | Refills: 0 | Status: SHIPPED | OUTPATIENT
Start: 2025-04-11

## 2025-05-04 DIAGNOSIS — E53.9 VITAMIN B DEFICIENCY: ICD-10-CM

## 2025-05-06 RX ORDER — PYRIDOXINE HCL (VITAMIN B6) 50 MG
250 TABLET ORAL DAILY
Qty: 150 TABLET | Refills: 5 | Status: SHIPPED | OUTPATIENT
Start: 2025-05-06
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
## 2025-05-22 ENCOUNTER — OFFICE VISIT (OUTPATIENT)
Dept: PSYCHIATRY | Facility: CLINIC | Age: 30
End: 2025-05-22
Payer: COMMERCIAL

## 2025-05-22 VITALS
DIASTOLIC BLOOD PRESSURE: 66 MMHG | BODY MASS INDEX: 19.92 KG/M2 | HEIGHT: 75 IN | WEIGHT: 160.2 LBS | OXYGEN SATURATION: 90 % | SYSTOLIC BLOOD PRESSURE: 96 MMHG | HEART RATE: 86 BPM

## 2025-05-22 DIAGNOSIS — F41.1 GAD (GENERALIZED ANXIETY DISORDER): Primary | ICD-10-CM

## 2025-05-22 DIAGNOSIS — F33.1 MODERATE EPISODE OF RECURRENT MAJOR DEPRESSIVE DISORDER: ICD-10-CM

## 2025-06-03 ENCOUNTER — OFFICE VISIT (OUTPATIENT)
Dept: FAMILY MEDICINE CLINIC | Facility: CLINIC | Age: 30
End: 2025-06-03
Payer: COMMERCIAL

## 2025-06-03 ENCOUNTER — LAB (OUTPATIENT)
Dept: LAB | Facility: HOSPITAL | Age: 30
End: 2025-06-03
Payer: COMMERCIAL

## 2025-06-03 VITALS
WEIGHT: 160.8 LBS | DIASTOLIC BLOOD PRESSURE: 70 MMHG | OXYGEN SATURATION: 100 % | HEIGHT: 75 IN | SYSTOLIC BLOOD PRESSURE: 118 MMHG | BODY MASS INDEX: 19.99 KG/M2 | HEART RATE: 77 BPM

## 2025-06-03 DIAGNOSIS — H53.139 SUDDEN VISUAL LOSS, UNSPECIFIED LATERALITY: ICD-10-CM

## 2025-06-03 DIAGNOSIS — M79.2 NERVE PAIN: ICD-10-CM

## 2025-06-03 DIAGNOSIS — M62.81 MUSCLE WEAKNESS OF EXTREMITY: ICD-10-CM

## 2025-06-03 DIAGNOSIS — G72.3: ICD-10-CM

## 2025-06-03 DIAGNOSIS — M79.2 NERVE PAIN: Primary | ICD-10-CM

## 2025-06-03 PROCEDURE — 86041 ACETYLCHOLN RCPTR BNDNG ANTB: CPT

## 2025-06-03 PROCEDURE — 1160F RVW MEDS BY RX/DR IN RCRD: CPT

## 2025-06-03 PROCEDURE — 86042 ACETYLCHOLN RCPTR BLCKG ANTB: CPT

## 2025-06-03 PROCEDURE — 36415 COLL VENOUS BLD VENIPUNCTURE: CPT

## 2025-06-03 PROCEDURE — 99214 OFFICE O/P EST MOD 30 MIN: CPT

## 2025-06-03 PROCEDURE — 86366 MUSCLE-SPECIFIC KINASE ANTB: CPT

## 2025-06-03 PROCEDURE — 1159F MED LIST DOCD IN RCRD: CPT

## 2025-06-03 RX ORDER — GABAPENTIN 100 MG/1
100 CAPSULE ORAL 2 TIMES DAILY PRN
Qty: 60 CAPSULE | Refills: 2 | Status: CANCELLED | OUTPATIENT
Start: 2025-06-03

## 2025-06-03 NOTE — PROGRESS NOTES
Chief Complaint  nerve pain    SUBJECTIVE  Corentez Woodrow Hathaway presents to [facility]    History of Present Illness  Patient is a 29-year-old male presents today for 3 month follow up on nerve pain.  Patient was started on gabapentin 100 mg twice daily.  Patient denies any adverse effects of medication.  Patient reports this has been helping control pain to some extent, pain is dulled, no completely alleviated.   He is able to get up and be more mobile. UDS and CSA are UTD from 1/28/25. PDMP/NICK reviewed- last fill was 5/4/25. He is still having episodes where his lower body will lose all muscle tone, he is unable to move his body even thought he is trying to.He states is occurs in his lower extremities more often but has occurred in his upper extremities if he is lying down. States his family has to rub his legs due to the pain. He eventually is able to walk again He has also been having episodes where his vision will go blurry or lose vision all together. Has occurred both left and right. He states she did see an optometrist but not his regular one, plans to follow up with them. Never had brain MRI or neurologist consult. Patient has difficulty given detailed description as it triggers his anxiety. Actively states he is having nerve pain episodes in room.          Past Medical History:   Diagnosis Date    Allergic rhinitis     Anxiety     Asthma 07/28/2015    Chronic bronchitis     Depression     Esophagitis, eosinophilic 08/18/2016    Fecal incontinence alternating with constipation 08/18/2016    Gastroparesis 07/28/2015    Jaundice     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 08/18/2016    Mental impairment 08/18/2016    Panic attacks     Pelvic floor dysfunction 07/28/2015    Special educational needs 03/14/2017    Speech impediment 08/18/2016    Vitamin B12 deficiency 07/18/2016    Vitamin D deficiency 08/18/2016    Weight loss       Family History   Problem  Relation Age of Onset    No Known Problems Mother     No Known Problems Father     No Known Problems Sister     No Known Problems Brother     No Known Problems Maternal Aunt     No Known Problems Paternal Aunt     No Known Problems Maternal Uncle     No Known Problems Paternal Uncle     No Known Problems Maternal Grandfather     No Known Problems Maternal Grandmother     No Known Problems Paternal Grandfather     No Known Problems Paternal Grandmother     No Known Problems Cousin     No Known Problems Other     ADD / ADHD Neg Hx     Alcohol abuse Neg Hx     Anxiety disorder Neg Hx     Bipolar disorder Neg Hx     Dementia Neg Hx     Depression Neg Hx     Drug abuse Neg Hx     OCD Neg Hx     Paranoid behavior Neg Hx     Schizophrenia Neg Hx     Seizures Neg Hx     Self-Injurious Behavior  Neg Hx     Suicide Attempts Neg Hx       Past Surgical History:   Procedure Laterality Date    CHOLECYSTECTOMY          Current Outpatient Medications:     albuterol (ACCUNEB) 1.25 MG/3ML nebulizer solution, Take 3 mL by nebulization Every 6 (Six) Hours As Needed for Wheezing or Shortness of Air., Disp: 30 mL, Rfl: 12    albuterol sulfate  (90 Base) MCG/ACT inhaler, Inhale 2 puffs Every 6 (Six) Hours As Needed for Wheezing., Disp: 1 g, Rfl: 3    amLODIPine (NORVASC) 2.5 MG tablet, Take 1 tablet by mouth Daily., Disp: 90 tablet, Rfl: 0    Carboxymethylcellulose Sodium (EYE DROPS OP), INSTILL 1 DROP INTO BOTH EYES 2 (TWO) TIMES A DAY, Disp: , Rfl:     cetirizine (zyrTEC) 10 MG tablet, Take 1 tablet by mouth Daily., Disp: 90 tablet, Rfl: 1    chlorhexidine (PERIDEX) 0.12 % solution, Apply 15 mL to the mouth or throat 2 (Two) Times a Day., Disp: , Rfl:     EPINEPHrine (EPIPEN) 0.3 MG/0.3ML solution auto-injector injection, INJECT 0.3ML INTO THE APPROPRIATE MUSCLE AS DIRECTED BY PRESCRIBER 1 TIME FOR 1 DOSE, Disp: 2 each, Rfl: 0    escitalopram (Lexapro) 20 MG tablet, Take 1 tablet by mouth Daily., Disp: 90 tablet, Rfl: 0    Folic  Acid 5 MG capsule, Take 5 mg by mouth Daily., Disp: 30 each, Rfl: 5    gabapentin (NEURONTIN) 100 MG capsule, Take 1 capsule by mouth 2 (Two) Times a Day As Needed (nerve pain)., Disp: 60 capsule, Rfl: 2    Ketotifen Fumarate (ZADITOR) 0.035 % solution, Administer 1 drop to both eyes 2 (Two) Times a Day., Disp: 10 mL, Rfl: 2    linaclotide (Linzess) 290 MCG capsule capsule, Take 1 capsule by mouth Every Morning Before Breakfast., Disp: 30 capsule, Rfl: 5    mometasone-formoterol (Dulera) 50-5 MCG/ACT inhaler, Inhale 2 puffs 2 (Two) Times a Day., Disp: 1 each, Rfl: 3    montelukast (SINGULAIR) 10 MG tablet, Take 1 tablet by mouth Every Night., Disp: 90 tablet, Rfl: 1    omeprazole (priLOSEC) 40 MG capsule, Take 1 capsule by mouth Daily. Patient has been taking PPI, Disp: 30 capsule, Rfl: 5    ondansetron ODT (ZOFRAN-ODT) 8 MG disintegrating tablet, Take 1 tablet by mouth Every 8 (Eight) Hours As Needed for Nausea or Vomiting., Disp: 30 tablet, Rfl: 0    Pyridoxine HCl (Vitamin B6) 250 MG tablet, Take 250 mg by mouth Daily., Disp: 90 tablet, Rfl: 1    Spacer/Aero-Holding Chambers (EQ Space Chamber Anti-Static) device, USE AS DIRECTED WITH HAND HELD INHALER, Disp: , Rfl:     sucralfate (Carafate) 1 g tablet, Take 1 tablet by mouth 4 (Four) Times a Day., Disp: 120 tablet, Rfl: 5    vitamin B-12 (CYANOCOBALAMIN) 1000 MCG tablet, Take 1 tablet by mouth Daily., Disp: 30 tablet, Rfl: 5    vitamin B-6 (pyridoxine) 50 MG tablet, TAKE 5 TABLETS BY MOUTH ONCE DAILY, Disp: 150 tablet, Rfl: 5    vitamin D (ERGOCALCIFEROL) 1.25 MG (37192 UT) capsule capsule, Take 1 capsule by mouth 1 (One) Time Per Week., Disp: 13 capsule, Rfl: 0    zinc oxide (Desitin Daily Defense) 13 % cream cream, Apply 1 application  topically to the appropriate area as directed Every 1 (One) Hour As Needed (skin irritation)., Disp: 57 g, Rfl: 0    predniSONE (DELTASONE) 10 MG tablet, Take 4 tabs daily x 3 days, then take 3 tabs daily x 3 days, then take 2  "tabs daily x 3 days, then take 1 tab daily x 3 days (Patient not taking: Reported on 6/3/2025), Disp: 31 tablet, Rfl: 0    OBJECTIVE  Vital Signs:   /70 (BP Location: Left arm, Patient Position: Sitting, Cuff Size: Adult)   Pulse 77   Ht 190.5 cm (75\")   Wt 72.9 kg (160 lb 12.8 oz)   SpO2 100%   BMI 20.10 kg/m²    Estimated body mass index is 20.1 kg/m² as calculated from the following:    Height as of this encounter: 190.5 cm (75\").    Weight as of this encounter: 72.9 kg (160 lb 12.8 oz).     Wt Readings from Last 3 Encounters:   06/03/25 72.9 kg (160 lb 12.8 oz)   05/22/25 72.7 kg (160 lb 3.2 oz)   04/11/25 70.9 kg (156 lb 6.4 oz)     BP Readings from Last 3 Encounters:   06/03/25 118/70   05/22/25 96/66   04/11/25 102/75       Physical Exam  Vitals reviewed.   Constitutional:       General: He is not in acute distress.     Appearance: He is not ill-appearing.   HENT:      Head: Normocephalic and atraumatic.   Eyes:      Conjunctiva/sclera: Conjunctivae normal.   Cardiovascular:      Rate and Rhythm: Normal rate and regular rhythm.      Heart sounds: Normal heart sounds.   Pulmonary:      Effort: Pulmonary effort is normal.      Breath sounds: Normal breath sounds.   Musculoskeletal:      Cervical back: Normal range of motion.   Neurological:      Mental Status: He is alert and oriented to person, place, and time.      Motor: Weakness present.      Gait: Gait abnormal.   Psychiatric:         Mood and Affect: Mood is anxious.         Behavior: Behavior normal.          Result Review    Common labs          8/6/2024    09:12 11/27/2024    09:14 1/28/2025    14:42   Common Labs   Glucose 81  111  86    BUN 5  3  7    Creatinine 1.03  0.88  0.95    Sodium 137  137  136    Potassium 4.1  4.2  3.7    Chloride 105  101  104    Calcium 9.3  9.5  9.0    Albumin 4.2  4.2  4.0    Total Bilirubin 1.0  0.7  0.9    Alkaline Phosphatase 86  88  75    AST (SGOT) 15  20  15    ALT (SGPT) 5  22  9    WBC 6.50  6.58  " 7.21    Hemoglobin 12.3  12.7  12.8    Hematocrit 37.8  39.0  36.4    Platelets 259  271  298    Total Cholesterol 131      Triglycerides 38      HDL Cholesterol 48      LDL Cholesterol  73          No Images in the past 120 days found..      The above data has been reviewed by ARSH Tse 06/03/2025 08:24 EDT.          Patient Care Team:  Laly Chi APRN as PCP - General (Nurse Practitioner)  Cas Hernandez MD as Consulting Physician (Pulmonary Disease)  Ludy Chi APRN as Nurse Practitioner (Pulmonary Disease)    BMI is within normal parameters. No other follow-up for BMI required.       ASSESSMENT & PLAN    Diagnoses and all orders for this visit:    1. Nerve pain (Primary)  Comments:  getting benefit from gabapentin 100 mg twice daily as needed, 3 month follow up  Orders:  -     Acetylcholine Receptor, Binding; Future  -     Acetylcholine Receptor, Blocking; Future  -     Musk Antibody; Future  -     Ambulatory Referral to Neurology    2. Sudden visual loss, unspecified laterality  -     MRI Brain With & Without Contrast; Future  -     Acetylcholine Receptor, Binding; Future  -     Acetylcholine Receptor, Blocking; Future  -     Musk Antibody; Future  -     Ambulatory Referral to Neurology    3. Myotonic periodic paralysis  -     MRI Brain With & Without Contrast; Future  -     Acetylcholine Receptor, Binding; Future  -     Acetylcholine Receptor, Blocking; Future  -     Musk Antibody; Future  -     Ambulatory Referral to Neurology    4. Muscle weakness of extremity  -     MRI Brain With & Without Contrast; Future  -     Acetylcholine Receptor, Binding; Future  -     Acetylcholine Receptor, Blocking; Future  -     Musk Antibody; Future  -     Ambulatory Referral to Neurology    Placing referral to neurology for evaluation of symptoms he describes. Will order brain imaging with MRI as well as lab work up for Myasthenia Gravis. Continue the gabapentin at this time for pain relief. Recommended  ophthalmology evaluation.     Tobacco Use: Low Risk  (6/3/2025)    Patient History     Smoking Tobacco Use: Never     Smokeless Tobacco Use: Never     Passive Exposure: Never       Follow Up     Return in about 3 months (around 9/3/2025) for Next scheduled follow up.      Patient was given instructions and counseling regarding his condition or for health maintenance advice. Please see specific information pulled into the AVS if appropriate.   I have reviewed information obtained and documented by others and I have confirmed the accuracy of this documented note.    Laly Chi, ARSH

## 2025-06-05 LAB — ACHR BIND AB SER-SCNC: <0.07 NMOL/L (ref 0–0.24)

## 2025-06-06 LAB — ACHR BLOCK AB SER-ACNC: 17 % (ref 0–25)

## 2025-06-13 ENCOUNTER — RESULTS FOLLOW-UP (OUTPATIENT)
Dept: FAMILY MEDICINE CLINIC | Facility: CLINIC | Age: 30
End: 2025-06-13
Payer: COMMERCIAL

## 2025-06-13 LAB — MUSK AB SER IA-ACNC: <1 U/ML

## 2025-06-17 NOTE — TELEPHONE ENCOUNTER
Name: LÁZARO VARGAS    Relationship: Mother    HUB PROVIDED THE RELAY MESSAGE FROM THE OFFICE   PATIENT VOICED UNDERSTANDING AND HAS NO FURTHER QUESTIONS AT THIS TIME

## 2025-06-17 NOTE — TELEPHONE ENCOUNTER
Name: LÁZARO VARGAS    Relationship: Mother    Best Callback Number:     [phone]       HUB PROVIDED THE RELAY MESSAGE FROM THE OFFICE   PATIENT HAS FURTHER QUESTIONS AND WOULD LIKE A CALL BACK    ADDITIONAL INFORMATION: PATIENT'S MOM STATES THAT SHE WOULD LIKE TO KNOW IF THIS IS GOOD OR NOT.

## 2025-06-17 NOTE — TELEPHONE ENCOUNTER
"Relay     \"Left VM to return call (1st attempt)   Labs for myasthenia gravis all negative  Musk Antibody; Acetylcholine Receptor, Blocking; Acetylcholine Receptor, Binding\"                "

## 2025-06-26 DIAGNOSIS — K31.84 GASTROPARESIS: ICD-10-CM

## 2025-06-26 RX ORDER — ONDANSETRON 8 MG/1
8 TABLET, ORALLY DISINTEGRATING ORAL EVERY 8 HOURS PRN
Qty: 30 TABLET | Refills: 0 | Status: SHIPPED | OUTPATIENT
Start: 2025-06-26
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
## 2025-07-01 ENCOUNTER — OFFICE VISIT (OUTPATIENT)
Dept: PSYCHIATRY | Facility: CLINIC | Age: 30
End: 2025-07-01
Payer: COMMERCIAL

## 2025-07-01 VITALS
DIASTOLIC BLOOD PRESSURE: 68 MMHG | WEIGHT: 160 LBS | BODY MASS INDEX: 19.89 KG/M2 | HEART RATE: 82 BPM | SYSTOLIC BLOOD PRESSURE: 109 MMHG | HEIGHT: 75 IN

## 2025-07-01 DIAGNOSIS — F41.1 GAD (GENERALIZED ANXIETY DISORDER): ICD-10-CM

## 2025-07-01 DIAGNOSIS — F33.1 MODERATE EPISODE OF RECURRENT MAJOR DEPRESSIVE DISORDER: Primary | ICD-10-CM

## 2025-07-14 NOTE — PROGRESS NOTES
Primary Care Provider  Laly Chi APRN   Referring Provider  No ref. provider found      Patient Complaint  Asthma, Follow-up (3 month), Shortness of Breath, Wheezing, Cough, and Dizzy (Patient states when he wake up he feels dizzy)      Subjective          Giselle Hathaway presents to [facility]      Giselle Hathaway is a 30 y.o. male patient of Dr. Hernandez with asthma, eosinophilia, elevated IgE, and environmental allergies, here for 3 month follow-up.    History of Present Illness    Patient states he is doing okay since his last visit, continues to have wheezing, cough, and dizziness upon waking. He is accompanied by his mother.  He was prescribed a prednisone taper last visit for asthma exacerbation but was never picked up due to the pharmacy flagging it.  He experiences intermittent wheezing, predominantly at night. He has gained a little weight since his last visit and is actively trying to gain more.  He denies any fevers or chills, no ED visits or hospitalizations for his breathing since he was last seen.  His current medication regimen includes daily Singulair and Zyrtec, which he finds beneficial for his allergies. He also uses Dulera inhaler twice daily, which effectively manages his asthma symptoms. He reserves the use of his nebulizer for severe episodes. He has a history of allergy injections but stopped after the doctor left.  He has never smoked.  Patient denies any hemoptysis, swollen lymph nodes, weight loss, or night sweats.  Overall, patient is doing well and has no additional concerns at this time.  Patient is able to perform ADLs without difficulty.  I have personally reviewed the review of systems, past family, social, medical and surgical histories; and agree with their findings.    Pulmonary Meds  Dulera  Albuterol inhaler  Albuterol nebs  Singulair  Zyrtec    Pulmonary Equipment  None      Family History   Problem  Relation Age of Onset    No Known Problems Mother     No Known Problems Father     No Known Problems Sister     No Known Problems Brother     No Known Problems Maternal Aunt     No Known Problems Paternal Aunt     No Known Problems Maternal Uncle     No Known Problems Paternal Uncle     No Known Problems Maternal Grandfather     No Known Problems Maternal Grandmother     No Known Problems Paternal Grandfather     No Known Problems Paternal Grandmother     No Known Problems Cousin     No Known Problems Other     ADD / ADHD Neg Hx     Alcohol abuse Neg Hx     Anxiety disorder Neg Hx     Bipolar disorder Neg Hx     Dementia Neg Hx     Depression Neg Hx     Drug abuse Neg Hx     OCD Neg Hx     Paranoid behavior Neg Hx     Schizophrenia Neg Hx     Seizures Neg Hx     Self-Injurious Behavior  Neg Hx     Suicide Attempts Neg Hx         Social History     Socioeconomic History    Marital status: Single   Tobacco Use    Smoking status: Never     Passive exposure: Never    Smokeless tobacco: Never   Vaping Use    Vaping status: Never Used   Substance and Sexual Activity    Alcohol use: Never    Drug use: Never    Sexual activity: Defer        Past Medical History:   Diagnosis Date    Allergic rhinitis     Anxiety     Asthma 07/28/2015    Chronic bronchitis     Depression     Esophagitis, eosinophilic 08/18/2016    Fecal incontinence alternating with constipation 08/18/2016    Gastroparesis 07/28/2015    Jaundice     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 08/18/2016    Mental impairment 08/18/2016    Panic attacks     Pelvic floor dysfunction 07/28/2015    Special educational needs 03/14/2017    Speech impediment 08/18/2016    Vitamin B12 deficiency 07/18/2016    Vitamin D deficiency 08/18/2016    Weight loss         Immunization History   Administered Date(s) Administered    COVID-19 (PFIZER) 12YRS+ (COMIRNATY) 09/18/2024    COVID-19 (PFIZER) BIVALENT 12+YRS 07/25/2023    COVID-19 (PFIZER) Purple Cap Monovalent  03/18/2021, 04/12/2021, 11/16/2021    DTP / HiB 04/12/1996    Flu Vaccine Intradermal Quad 18-64YR 11/12/2021    Fluzone  >6mos 10/24/2024    Fluzone (or Fluarix & Flulaval for VFC) >6mos 10/11/2022    Fluzone Quad >6mos (Multi-dose) 12/05/2019    Hep B, Adolescent or Pediatric 04/12/1996    Influenza Injectable Mdck Pf Quad 11/12/2021, 10/03/2023    MCV4 Unspecified 10/06/2008    MMR 10/14/1996    Meningococcal Conjugate 10/06/2008    OPV 04/12/1996    Tdap 07/01/2021, 12/08/2023    Varicella 10/06/2008       Allergies   Allergen Reactions    Nuts Shortness Of Breath    Penicillins Other (See Comments)     Rash and shortness of breath  Allergic to any of penicillin family    Amoxicillin Itching    Egg-Derived Products Itching    Hydrocortisone Rash    Ibuprofen Rash    Soybean Oil Rash    Ventolin [Albuterol] Other (See Comments)     States he can not have ventolin but can take name brand albuterol   Unknown reaction    Wheat Rash          Current Outpatient Medications:     albuterol (ACCUNEB) 1.25 MG/3ML nebulizer solution, Take 3 mL by nebulization Every 6 (Six) Hours As Needed for Wheezing or Shortness of Air., Disp: 30 mL, Rfl: 12    albuterol sulfate  (90 Base) MCG/ACT inhaler, Inhale 2 puffs Every 6 (Six) Hours As Needed for Wheezing., Disp: 1 g, Rfl: 3    amLODIPine (NORVASC) 2.5 MG tablet, Take 1 tablet by mouth Daily., Disp: 90 tablet, Rfl: 0    Carboxymethylcellulose Sodium (EYE DROPS OP), INSTILL 1 DROP INTO BOTH EYES 2 (TWO) TIMES A DAY, Disp: , Rfl:     cetirizine (zyrTEC) 10 MG tablet, Take 1 tablet by mouth Daily., Disp: 90 tablet, Rfl: 1    chlorhexidine (PERIDEX) 0.12 % solution, Apply 15 mL to the mouth or throat 2 (Two) Times a Day., Disp: , Rfl:     escitalopram (Lexapro) 20 MG tablet, Take 1 tablet by mouth Daily., Disp: 90 tablet, Rfl: 0    Folic Acid 5 MG capsule, Take 5 mg by mouth Daily., Disp: 30 each, Rfl: 5    gabapentin (NEURONTIN) 100 MG capsule, Take 1 capsule by mouth 2 (Two)  Times a Day As Needed (nerve pain)., Disp: 60 capsule, Rfl: 2    Ketotifen Fumarate (ZADITOR) 0.035 % solution, Administer 1 drop to both eyes 2 (Two) Times a Day., Disp: 10 mL, Rfl: 2    linaclotide (Linzess) 290 MCG capsule capsule, Take 1 capsule by mouth Every Morning Before Breakfast., Disp: 30 capsule, Rfl: 5    mometasone-formoterol (Dulera) 50-5 MCG/ACT inhaler, Inhale 2 puffs 2 (Two) Times a Day., Disp: 1 each, Rfl: 3    montelukast (SINGULAIR) 10 MG tablet, Take 1 tablet by mouth Every Night., Disp: 90 tablet, Rfl: 1    omeprazole (priLOSEC) 40 MG capsule, Take 1 capsule by mouth Daily. Patient has been taking PPI, Disp: 30 capsule, Rfl: 5    ondansetron ODT (ZOFRAN-ODT) 8 MG disintegrating tablet, DISSOLVE 1 TABLET IN MOUTH EVERY 8 HOURS AS NEEDED FOR NAUSEA AND VOMITING, Disp: 30 tablet, Rfl: 0    Pyridoxine HCl (Vitamin B6) 250 MG tablet, Take 250 mg by mouth Daily., Disp: 90 tablet, Rfl: 1    Spacer/Aero-Holding Chambers (EQ Space Chamber Anti-Static) device, USE AS DIRECTED WITH HAND HELD INHALER, Disp: , Rfl:     sucralfate (Carafate) 1 g tablet, Take 1 tablet by mouth 4 (Four) Times a Day., Disp: 120 tablet, Rfl: 5    vitamin B-12 (CYANOCOBALAMIN) 1000 MCG tablet, Take 1 tablet by mouth Daily., Disp: 30 tablet, Rfl: 5    vitamin B-6 (pyridoxine) 50 MG tablet, TAKE 5 TABLETS BY MOUTH ONCE DAILY, Disp: 150 tablet, Rfl: 5    vitamin D (ERGOCALCIFEROL) 1.25 MG (07041 UT) capsule capsule, Take 1 capsule by mouth 1 (One) Time Per Week., Disp: 13 capsule, Rfl: 0    zinc oxide (Desitin Daily Defense) 13 % cream cream, Apply 1 application  topically to the appropriate area as directed Every 1 (One) Hour As Needed (skin irritation)., Disp: 57 g, Rfl: 0    EPINEPHrine (EPIPEN) 0.3 MG/0.3ML solution auto-injector injection, INJECT 0.3ML INTO THE APPROPRIATE MUSCLE AS DIRECTED BY PRESCRIBER 1 TIME FOR 1 DOSE (Patient not taking: Reported on 7/15/2025), Disp: 2 each, Rfl: 0    predniSONE (DELTASONE) 10 MG tablet,  "Take 4 tabs daily x 3 days, then take 3 tabs daily x 3 days, then take 2 tabs daily x 3 days, then take 1 tab daily x 3 days, Disp: 31 tablet, Rfl: 0     Objective     Vital Signs:   /80 (BP Location: Left arm, Patient Position: Sitting, Cuff Size: Adult)   Pulse 88   Temp 97.9 °F (36.6 °C) (Oral)   Resp 14   Ht 190.5 cm (75\")   Wt 74.8 kg (164 lb 12.8 oz)   SpO2 97% Comment: room air  BMI 20.60 kg/m²     Physical Exam  Constitutional:       General: He is not in acute distress.     Appearance: Normal appearance. He is normal weight. He is not ill-appearing.   HENT:      Right Ear: Tympanic membrane and ear canal normal.      Left Ear: Tympanic membrane and ear canal normal.      Nose: Nose normal.      Mouth/Throat:      Mouth: Mucous membranes are moist.      Pharynx: Oropharynx is clear.   Cardiovascular:      Rate and Rhythm: Normal rate and regular rhythm.      Pulses: Normal pulses.      Heart sounds: Normal heart sounds.   Pulmonary:      Effort: Pulmonary effort is normal. No respiratory distress.      Breath sounds: Normal breath sounds. No stridor. No wheezing, rhonchi or rales.   Musculoskeletal:         General: No swelling. Normal range of motion.      Cervical back: Normal range of motion and neck supple.      Right lower leg: No edema.      Left lower leg: No edema.   Skin:     General: Skin is warm and dry.   Neurological:      General: No focal deficit present.      Mental Status: He is alert and oriented to person, place, and time.      Motor: No weakness.   Psychiatric:         Mood and Affect: Mood normal.         Behavior: Behavior normal.         Result Review :   I have personally reviewed patient's labs and images.  I also reviewed my last progress note 4/11/2025.    Results    -PFTs 10/30/2023 FEV1 76% of predicted, no significant response of FVC or FEV1 to bronchodilator, though there was good response in the small airways.  Lung volumes decreased, TLC 66% of predicted.  DLCO " severely reduced 43% predicted.  -CXR 8/29/2024 showed no acute cardiopulmonary abnormalities  -Eosinophilia most recently 170 on 1/28/2025  -IgE very elevated 1462 on 8/19/2019       Diagnoses and all orders for this visit:    1. Elevated IgE level (Primary)  -     predniSONE (DELTASONE) 10 MG tablet; Take 4 tabs daily x 3 days, then take 3 tabs daily x 3 days, then take 2 tabs daily x 3 days, then take 1 tab daily x 3 days  Dispense: 31 tablet; Refill: 0    2. Peripheral eosinophilia  -     predniSONE (DELTASONE) 10 MG tablet; Take 4 tabs daily x 3 days, then take 3 tabs daily x 3 days, then take 2 tabs daily x 3 days, then take 1 tab daily x 3 days  Dispense: 31 tablet; Refill: 0    3. Environmental allergies  -     predniSONE (DELTASONE) 10 MG tablet; Take 4 tabs daily x 3 days, then take 3 tabs daily x 3 days, then take 2 tabs daily x 3 days, then take 1 tab daily x 3 days  Dispense: 31 tablet; Refill: 0    4. Moderate asthma, unspecified whether complicated, unspecified whether persistent  -     predniSONE (DELTASONE) 10 MG tablet; Take 4 tabs daily x 3 days, then take 3 tabs daily x 3 days, then take 2 tabs daily x 3 days, then take 1 tab daily x 3 days  Dispense: 31 tablet; Refill: 0      Assessment & Plan    -Prednisone taper prescribed today for asthma exacerbation, patient did not take prednisone last time as prescribed due to the pharmacy flagging it due to his hydrocortisone allergy.  Instructed patient that it should be safe for him to take the prednisone.  -Continue using Dulera inhaler daily, reminded patient to rinse mouth after each use.  Patient believes he has tried Trelegy before when he was younger and did not tolerate it well.  -Continue using albuterol inhaler and nebulizer treatments as needed  -Continue taking Singulair, Zyrtec for allergies.  Patient has had allergy shots in the past, declines referral to allergist at this time.  -Follow-up in 3 months, may return sooner if  needed    Smoking status: Reviewed  Vaccination status: Patient reports he is up-to-date with his flu, pneumonia, and Covid vaccines.  Patient is advised to continue to follow CDC recommendations such as social distancing wearing a mask and washing hands for at least 20 seconds.  Medications personally reviewed    Follow Up   No follow-ups on file.  Patient was given instructions and counseling regarding his condition or for health maintenance advice. Please see specific information pulled into the AVS if appropriate.

## 2025-07-15 ENCOUNTER — OFFICE VISIT (OUTPATIENT)
Dept: PULMONOLOGY | Facility: CLINIC | Age: 30
End: 2025-07-15
Payer: COMMERCIAL

## 2025-07-15 ENCOUNTER — HOSPITAL ENCOUNTER (OUTPATIENT)
Dept: MRI IMAGING | Facility: HOSPITAL | Age: 30
Discharge: HOME | End: 2025-07-15
Payer: COMMERCIAL

## 2025-07-15 VITALS
WEIGHT: 164.8 LBS | OXYGEN SATURATION: 97 % | HEART RATE: 88 BPM | SYSTOLIC BLOOD PRESSURE: 110 MMHG | RESPIRATION RATE: 14 BRPM | DIASTOLIC BLOOD PRESSURE: 80 MMHG | BODY MASS INDEX: 20.49 KG/M2 | HEIGHT: 75 IN | TEMPERATURE: 97.9 F

## 2025-07-15 DIAGNOSIS — D72.19 PERIPHERAL EOSINOPHILIA: ICD-10-CM

## 2025-07-15 DIAGNOSIS — J45.909 MODERATE ASTHMA, UNSPECIFIED WHETHER COMPLICATED, UNSPECIFIED WHETHER PERSISTENT: ICD-10-CM

## 2025-07-15 DIAGNOSIS — M62.81 MUSCLE WEAKNESS OF EXTREMITY: ICD-10-CM

## 2025-07-15 DIAGNOSIS — G72.3: ICD-10-CM

## 2025-07-15 DIAGNOSIS — R76.8 ELEVATED IGE LEVEL: Primary | ICD-10-CM

## 2025-07-15 DIAGNOSIS — Z91.09 ENVIRONMENTAL ALLERGIES: ICD-10-CM

## 2025-07-15 DIAGNOSIS — H53.139 SUDDEN VISUAL LOSS, UNSPECIFIED LATERALITY: ICD-10-CM

## 2025-07-15 PROCEDURE — 1159F MED LIST DOCD IN RCRD: CPT

## 2025-07-15 PROCEDURE — 70553 MRI BRAIN STEM W/O & W/DYE: CPT

## 2025-07-15 PROCEDURE — 25510000001 GADOPICLENOL 0.5 MMOL/ML SOLUTION

## 2025-07-15 PROCEDURE — 1160F RVW MEDS BY RX/DR IN RCRD: CPT

## 2025-07-15 PROCEDURE — 99214 OFFICE O/P EST MOD 30 MIN: CPT

## 2025-07-15 PROCEDURE — A9573 GADOPICLENOL 0.5 MMOL/ML SOLUTION: HCPCS

## 2025-07-15 RX ORDER — PREDNISONE 10 MG/1
TABLET ORAL
Qty: 31 TABLET | Refills: 0 | Status: SHIPPED | OUTPATIENT
Start: 2025-07-15

## 2025-07-15 RX ADMIN — GADOPICLENOL 7 ML: 485.1 INJECTION INTRAVENOUS at 13:06

## 2025-08-12 ENCOUNTER — OFFICE VISIT (OUTPATIENT)
Dept: PSYCHIATRY | Facility: CLINIC | Age: 30
End: 2025-08-12
Payer: COMMERCIAL

## 2025-08-12 VITALS
HEART RATE: 83 BPM | WEIGHT: 169 LBS | SYSTOLIC BLOOD PRESSURE: 112 MMHG | DIASTOLIC BLOOD PRESSURE: 71 MMHG | OXYGEN SATURATION: 97 % | HEIGHT: 75 IN | BODY MASS INDEX: 21.01 KG/M2

## 2025-08-12 DIAGNOSIS — F33.41 MAJOR DEPRESSIVE DISORDER, RECURRENT EPISODE, IN PARTIAL REMISSION: ICD-10-CM

## 2025-08-12 DIAGNOSIS — F41.1 GAD (GENERALIZED ANXIETY DISORDER): Primary | ICD-10-CM

## 2025-08-12 RX ORDER — ESCITALOPRAM OXALATE 20 MG/1
20 TABLET ORAL DAILY
Qty: 90 TABLET | Refills: 0 | Status: SHIPPED | OUTPATIENT
Start: 2025-08-12

## 2025-08-21 ENCOUNTER — TELEPHONE (OUTPATIENT)
Dept: FAMILY MEDICINE CLINIC | Facility: CLINIC | Age: 30
End: 2025-08-21
Payer: COMMERCIAL